# Patient Record
Sex: MALE | Race: WHITE | NOT HISPANIC OR LATINO | Employment: UNEMPLOYED | URBAN - METROPOLITAN AREA
[De-identification: names, ages, dates, MRNs, and addresses within clinical notes are randomized per-mention and may not be internally consistent; named-entity substitution may affect disease eponyms.]

---

## 2020-03-19 ENCOUNTER — HOSPITAL ENCOUNTER (OUTPATIENT)
Facility: HOSPITAL | Age: 60
Discharge: HOME | End: 2020-03-20
Attending: EMERGENCY MEDICINE | Admitting: INTERNAL MEDICINE
Payer: MEDICAID

## 2020-03-19 DIAGNOSIS — R07.9 CHEST PAIN: ICD-10-CM

## 2020-03-19 DIAGNOSIS — R06.02 SOB (SHORTNESS OF BREATH): ICD-10-CM

## 2020-03-19 DIAGNOSIS — R07.9 CHEST PAIN, UNSPECIFIED TYPE: Primary | ICD-10-CM

## 2020-03-19 PROBLEM — I10 HTN (HYPERTENSION): Status: ACTIVE | Noted: 2020-03-19

## 2020-03-19 PROBLEM — I25.10 CAD IN NATIVE ARTERY: Status: ACTIVE | Noted: 2020-03-19

## 2020-03-19 PROBLEM — I49.8 BIGEMINY: Status: ACTIVE | Noted: 2020-03-19

## 2020-03-19 PROBLEM — Z78.9 ALCOHOL USE: Status: ACTIVE | Noted: 2020-03-19

## 2020-03-19 PROBLEM — Z72.0 TOBACCO ABUSE: Status: ACTIVE | Noted: 2020-03-19

## 2020-03-19 PROBLEM — F10.90 ALCOHOL USE: Status: ACTIVE | Noted: 2020-03-19

## 2020-03-19 LAB
ALBUMIN SERPL BCP-MCNC: 4.4 G/DL (ref 3.5–5.2)
ALP SERPL-CCNC: 85 U/L (ref 55–135)
ALT SERPL W/O P-5'-P-CCNC: 25 U/L (ref 10–44)
ANION GAP SERPL CALC-SCNC: 18 MMOL/L (ref 8–16)
AST SERPL-CCNC: 50 U/L (ref 10–40)
BASOPHILS # BLD AUTO: 0.1 K/UL (ref 0–0.2)
BASOPHILS NFR BLD: 1.1 % (ref 0–1.9)
BILIRUB SERPL-MCNC: 1.2 MG/DL (ref 0.1–1)
BNP SERPL-MCNC: 50 PG/ML (ref 0–99)
BUN SERPL-MCNC: 10 MG/DL (ref 6–20)
CALCIUM SERPL-MCNC: 9.2 MG/DL (ref 8.7–10.5)
CHLORIDE SERPL-SCNC: 98 MMOL/L (ref 95–110)
CO2 SERPL-SCNC: 22 MMOL/L (ref 23–29)
CREAT SERPL-MCNC: 0.9 MG/DL (ref 0.5–1.4)
DIFFERENTIAL METHOD: ABNORMAL
EOSINOPHIL # BLD AUTO: 0.1 K/UL (ref 0–0.5)
EOSINOPHIL NFR BLD: 1.3 % (ref 0–8)
ERYTHROCYTE [DISTWIDTH] IN BLOOD BY AUTOMATED COUNT: 12.8 % (ref 11.5–14.5)
EST. GFR  (AFRICAN AMERICAN): >60 ML/MIN/1.73 M^2
EST. GFR  (NON AFRICAN AMERICAN): >60 ML/MIN/1.73 M^2
GLUCOSE SERPL-MCNC: 72 MG/DL (ref 70–110)
HCT VFR BLD AUTO: 43.2 % (ref 40–54)
HGB BLD-MCNC: 14.5 G/DL (ref 14–18)
IMM GRANULOCYTES # BLD AUTO: 0.03 K/UL (ref 0–0.04)
IMM GRANULOCYTES NFR BLD AUTO: 0.3 % (ref 0–0.5)
INR PPP: 1
LYMPHOCYTES # BLD AUTO: 1.6 K/UL (ref 1–4.8)
LYMPHOCYTES NFR BLD: 18.1 % (ref 18–48)
MAGNESIUM SERPL-MCNC: 2 MG/DL (ref 1.6–2.6)
MCH RBC QN AUTO: 31.9 PG (ref 27–31)
MCHC RBC AUTO-ENTMCNC: 33.6 G/DL (ref 32–36)
MCV RBC AUTO: 95 FL (ref 82–98)
MONOCYTES # BLD AUTO: 0.6 K/UL (ref 0.3–1)
MONOCYTES NFR BLD: 6.9 % (ref 4–15)
NEUTROPHILS # BLD AUTO: 6.5 K/UL (ref 1.8–7.7)
NEUTROPHILS NFR BLD: 72.3 % (ref 38–73)
NRBC BLD-RTO: 0 /100 WBC
PLATELET # BLD AUTO: 303 K/UL (ref 150–350)
PMV BLD AUTO: 9.8 FL (ref 9.2–12.9)
POTASSIUM SERPL-SCNC: 4.2 MMOL/L (ref 3.5–5.1)
PROT SERPL-MCNC: 7.5 G/DL (ref 6–8.4)
PROTHROMBIN TIME: 13.1 SEC (ref 10.6–14.8)
RBC # BLD AUTO: 4.55 M/UL (ref 4.6–6.2)
SODIUM SERPL-SCNC: 138 MMOL/L (ref 136–145)
TROPONIN I SERPL DL<=0.01 NG/ML-MCNC: <0.03 NG/ML
WBC # BLD AUTO: 9.04 K/UL (ref 3.9–12.7)

## 2020-03-19 PROCEDURE — 85025 COMPLETE CBC W/AUTO DIFF WBC: CPT

## 2020-03-19 PROCEDURE — G0378 HOSPITAL OBSERVATION PER HR: HCPCS

## 2020-03-19 PROCEDURE — 63600175 PHARM REV CODE 636 W HCPCS: Performed by: EMERGENCY MEDICINE

## 2020-03-19 PROCEDURE — 83735 ASSAY OF MAGNESIUM: CPT

## 2020-03-19 PROCEDURE — 85610 PROTHROMBIN TIME: CPT

## 2020-03-19 PROCEDURE — 96365 THER/PROPH/DIAG IV INF INIT: CPT

## 2020-03-19 PROCEDURE — 93005 ELECTROCARDIOGRAM TRACING: CPT | Mod: 59 | Performed by: INTERNAL MEDICINE

## 2020-03-19 PROCEDURE — 25000003 PHARM REV CODE 250: Performed by: NURSE PRACTITIONER

## 2020-03-19 PROCEDURE — 94761 N-INVAS EAR/PLS OXIMETRY MLT: CPT

## 2020-03-19 PROCEDURE — 99285 EMERGENCY DEPT VISIT HI MDM: CPT | Mod: 25

## 2020-03-19 PROCEDURE — 80053 COMPREHEN METABOLIC PANEL: CPT

## 2020-03-19 PROCEDURE — 96366 THER/PROPH/DIAG IV INF ADDON: CPT

## 2020-03-19 PROCEDURE — 96367 TX/PROPH/DG ADDL SEQ IV INF: CPT

## 2020-03-19 PROCEDURE — 63600175 PHARM REV CODE 636 W HCPCS: Performed by: NURSE PRACTITIONER

## 2020-03-19 PROCEDURE — 84484 ASSAY OF TROPONIN QUANT: CPT

## 2020-03-19 PROCEDURE — 83880 ASSAY OF NATRIURETIC PEPTIDE: CPT

## 2020-03-19 RX ORDER — NAPROXEN SODIUM 220 MG/1
324 TABLET, FILM COATED ORAL ONCE
Status: COMPLETED | OUTPATIENT
Start: 2020-03-19 | End: 2020-03-19

## 2020-03-19 RX ORDER — MAGNESIUM SULFATE HEPTAHYDRATE 40 MG/ML
2 INJECTION, SOLUTION INTRAVENOUS ONCE
Status: COMPLETED | OUTPATIENT
Start: 2020-03-19 | End: 2020-03-19

## 2020-03-19 RX ADMIN — THIAMINE HYDROCHLORIDE 100 MG: 100 INJECTION, SOLUTION INTRAMUSCULAR; INTRAVENOUS at 10:03

## 2020-03-19 RX ADMIN — ASPIRIN 81 MG 324 MG: 81 TABLET ORAL at 01:03

## 2020-03-19 RX ADMIN — MAGNESIUM SULFATE 2 G: 2 INJECTION INTRAVENOUS at 04:03

## 2020-03-19 NOTE — ED PROVIDER NOTES
Encounter Date: 3/19/2020       History     Chief Complaint   Patient presents with    Shortness of Breath    Chest Pain     off and on with some dizziness for 1 week,  denies cough, states has some congestion in am, has cardiac stents, no fever     Presents with complaint of SOB and chest pain  Reports chest pain on and off for 1 yrs  SOB this AM  Denies NVD or fever  Pt reports he is should take both ASA and antihypetensive meds but does not        Review of patient's allergies indicates:  No Known Allergies  Past Medical History:   Diagnosis Date    Coronary artery disease     cardiac stents    Hypertension      History reviewed. No pertinent surgical history.  History reviewed. No pertinent family history.  Social History     Tobacco Use    Smoking status: Current Every Day Smoker     Packs/day: 2.00   Substance Use Topics    Alcohol use: Yes     Comment: occas    Drug use: Not on file     Review of Systems   Constitutional: Negative for fever.   Respiratory: Positive for cough and shortness of breath. Negative for wheezing.    Cardiovascular: Positive for chest pain. Negative for palpitations and leg swelling.   Gastrointestinal: Negative for abdominal pain, diarrhea, nausea and vomiting.   Musculoskeletal: Negative for back pain.   Skin: Negative for rash.   Neurological: Negative for weakness.       Physical Exam     Initial Vitals [03/19/20 1240]   BP Pulse Resp Temp SpO2   (!) 181/91 94 (!) 22 97.9 °F (36.6 °C) 96 %      MAP       --         Physical Exam    Constitutional: He appears well-developed and well-nourished.   HENT:   Head: Normocephalic and atraumatic.   Mouth/Throat: Oropharynx is clear and moist.   Eyes: Conjunctivae are normal.   Neck: Normal range of motion. Neck supple.   Cardiovascular: Normal rate and regular rhythm.   Pulmonary/Chest: Effort normal and breath sounds normal. No accessory muscle usage. No tachypnea. No respiratory distress.   Abdominal: Soft. Bowel sounds are  normal.   Musculoskeletal: Normal range of motion.   Neurological: He is alert and oriented to person, place, and time.   Skin: Skin is warm. Capillary refill takes less than 2 seconds.   Psychiatric: He has a normal mood and affect.         ED Course   Procedures  Labs Reviewed   CBC W/ AUTO DIFFERENTIAL - Abnormal; Notable for the following components:       Result Value    RBC 4.55 (*)     Mean Corpuscular Hemoglobin 31.9 (*)     All other components within normal limits   COMPREHENSIVE METABOLIC PANEL - Abnormal; Notable for the following components:    CO2 22 (*)     Total Bilirubin 1.2 (*)     AST 50 (*)     Anion Gap 18 (*)     All other components within normal limits   B-TYPE NATRIURETIC PEPTIDE   TROPONIN I   PROTIME-INR   MAGNESIUM   MAGNESIUM        ECG Results          EKG 12-lead (In process)  Result time 03/19/20 13:30:06    In process by Interface, Lab In [facility] (03/19/20 13:30:06)                 Narrative:    Test Reason : R07.9,    Vent. Rate : 079 BPM     Atrial Rate : 079 BPM     P-R Int : 148 ms          QRS Dur : 098 ms      QT Int : 394 ms       P-R-T Axes : 083 078 -21 degrees     QTc Int : 451 ms    Sinus rhythm with frequent Premature ventricular complexes  T wave abnormality, consider inferior ischemia  Abnormal ECG  No previous ECGs available    Referred By: [email or web address]   SELF           Confirmed By:                   In process by Interface, Lab In [facility] (03/19/20 13:08:59)                 Narrative:    Test Reason : R07.9,    Vent. Rate : 079 BPM     Atrial Rate : 079 BPM     P-R Int : 148 ms          QRS Dur : 098 ms      QT Int : 394 ms       P-R-T Axes : 083 078 -21 degrees     QTc Int : 451 ms    Sinus rhythm with frequent Premature ventricular complexes  T wave abnormality, consider inferior ischemia  Abnormal ECG  No previous ECGs available    Referred By: [email or web address]   SELF           Confirmed By:                             Imaging Results          X-Ray Chest AP Portable (Final  result)  Result time 03/19/20 13:19:54    Final result by Trino Luu MD (03/19/20 13:19:54)                 Impression:      No acute cardiopulmonary abnormality.      Electronically signed by: Trino Luu MD  Date:    03/19/2020  Time:    13:19             Narrative:    EXAMINATION:  XR CHEST AP PORTABLE    CLINICAL HISTORY:  SOB;    FINDINGS:  Portable chest without comparisons.  Normal cardiomediastinal silhouette.The lungs are clear.  Pulmonary vasculature is normal. No acute osseous abnormality.                                 Medical Decision Making:   Initial Assessment:   Chest pain with SOB  Dr. Lombardo in to examine pt                   ED Course as of Mar 19 1729   Thu Mar 19, 2020   1701 BP: 138/62 [KN]      ED Course User Index  [KN] Nohemi Golden NP                Clinical Impression:       ICD-10-CM ICD-9-CM   1. Chest pain, unspecified type R07.9 786.50   2. Chest pain R07.9 786.50   3. SOB (shortness of breath) R06.02 786.05                                Nohemi Golden NP  03/19/20 1624       Nohemi Golden NP  03/19/20 1732

## 2020-03-19 NOTE — SUBJECTIVE & OBJECTIVE
Past Medical History:   Diagnosis Date    Coronary artery disease     cardiac stents    Hypertension        History reviewed. No pertinent surgical history.    Review of patient's allergies indicates:  No Known Allergies    No current facility-administered medications on file prior to encounter.      No current outpatient medications on file prior to encounter.     Family History     None        Tobacco Use    Smoking status: Current Every Day Smoker     Packs/day: 2.00   Substance and Sexual Activity    Alcohol use: Yes     Comment: occas    Drug use: Not on file    Sexual activity: Not on file     Review of Systems   Constitutional: Negative.    HENT: Negative.    Eyes: Negative.    Respiratory: Positive for chest tightness and shortness of breath.    Cardiovascular: Positive for chest pain.   Gastrointestinal: Negative.    Endocrine: Negative.    Genitourinary: Negative.    Musculoskeletal: Negative.    Skin: Negative.    Allergic/Immunologic: Negative.    Neurological: Negative.    Hematological: Negative.    Psychiatric/Behavioral: Negative.      Objective:     Vital Signs (Most Recent):  Temp: 97.9 °F (36.6 °C) (03/19/20 1240)  Pulse: 86 (03/19/20 1730)  Resp: 18 (03/19/20 1730)  BP: (!) 146/65 (03/19/20 1730)  SpO2: (!) 90 % (03/19/20 1730) Vital Signs (24h Range):  Temp:  [97.9 °F (36.6 °C)] 97.9 °F (36.6 °C)  Pulse:  [69-94] 86  Resp:  [15-25] 18  SpO2:  [90 %-96 %] 90 %  BP: (138-181)/() 146/65     Weight: 56.7 kg (125 lb)  Body mass index is 18.46 kg/m².    Physical Exam   Constitutional: He is oriented to person, place, and time.   Thin male sitting up in bed with no distress at present.   HENT:   Head: Normocephalic.   Eyes: Pupils are equal, round, and reactive to light.   Neck: Normal range of motion. Neck supple. No thyromegaly present.   Cardiovascular: Normal rate, normal heart sounds and intact distal pulses. An irregular rhythm present.   Pulmonary/Chest: Effort normal and breath  sounds normal.   Abdominal: Soft. Bowel sounds are normal.   Musculoskeletal: Normal range of motion.   Neurological: He is alert and oriented to person, place, and time.   Skin: Skin is warm and dry. Capillary refill takes 2 to 3 seconds.   Psychiatric: He has a normal mood and affect.         CRANIAL NERVES     CN III, IV, VI   Pupils are equal, round, and reactive to light.       Significant Labs:   CBC:   Recent Labs   Lab 03/19/20  1300   WBC 9.04   HGB 14.5   HCT 43.2        CMP:   Recent Labs   Lab 03/19/20  1300      K 4.2   CL 98   CO2 22*   GLU 72   BUN 10   CREATININE 0.9   CALCIUM 9.2   PROT 7.5   ALBUMIN 4.4   BILITOT 1.2*   ALKPHOS 85   AST 50*   ALT 25   ANIONGAP 18*   EGFRNONAA >60.0     Cardiac Markers:   Recent Labs   Lab 03/19/20  1300   BNP 50     Magnesium:   Recent Labs   Lab 03/19/20  1300   MG 2.0       Significant Imaging:  Results for orders placed or performed during the hospital encounter of 03/19/20   EKG 12-lead    Collection Time: 03/19/20  3:46 PM    Narrative    Test Reason : R07.9,    Vent. Rate : 068 BPM     Atrial Rate : 068 BPM     P-R Int : 140 ms          QRS Dur : 092 ms      QT Int : 416 ms       P-R-T Axes : 086 081 013 degrees     QTc Int : 442 ms    Sinus rhythm with frequent Premature ventricular complexes  Otherwise normal ECG  When compared with ECG of 19-MAR-2020 15:44,  No significant change was found    Referred By: [email or web address]   SELF           Confirmed By:      Imaging Results          X-Ray Chest AP Portable (Final result)  Result time 03/19/20 13:19:54    Final result by Trino Luu MD (03/19/20 13:19:54)                 Impression:      No acute cardiopulmonary abnormality.      Electronically signed by: Trino Luu MD  Date:    03/19/2020  Time:    13:19             Narrative:    EXAMINATION:  XR CHEST AP PORTABLE    CLINICAL HISTORY:  SOB;    FINDINGS:  Portable chest without comparisons.  Normal cardiomediastinal silhouette.The lungs are  clear.  Pulmonary vasculature is normal. No acute osseous abnormality.

## 2020-03-19 NOTE — HPI
Mr Gonzales is a 58 yo CM who presented to the ED with CP, increasing SOB, HTN and Bigeminy. He states that he has been having CP off and on for the past year but this am he felt extremely SOB on waking this am. CP is atypical and fleeting. It is to the left side below his nipple area and nothing makes it worse and nothing makes it better. Has no radiation, N&V or diaphoresis with it. He does have a history of CAD with coronary intervention about 7 years ago in [place] (thinks it was the LAD). He is not on any medications and has not followed with any physicians since then. He does a lot of walking and this does not make CP worse but he notices increasing SOB with it. He does smoke 1.5-2 PPD cigarettes and admits to drinking 6-12 of beer daily.

## 2020-03-19 NOTE — ASSESSMENT & PLAN NOTE
Admit to observation on telemetry  Trend cardiac enzymes  Monitor for CP  NTG PRN   mg daily  Treadmill nuclear stress test in am.  Defer cardiology consult till am after stress test.

## 2020-03-19 NOTE — H&P
[facility]  History & Physical    Patient Name: Vlad Gonzales  MRN: [id number]  Admission Date: 3/19/2020  Attending Physician: ALEKSANDAR Kowalski  Primary Care Provider: Primary Doctor No         Patient information was obtained from patient and ER records.     Subjective:     Principal Problem:Chest pain    Chief Complaint:   Chief Complaint   Patient presents with    Shortness of Breath    Chest Pain     off and on with some dizziness for 1 week,  denies cough, states has some congestion in am, has cardiac stents, no fever        HPI: Mr Gonzales is a 58 yo CM who presented to the ED with CP, increasing SOB, HTN and Bigeminy. He states that he has been having CP off and on for the past year but this am he felt extremely SOB on waking this am. CP is atypical and fleeting. It is to the left side below his nipple area and nothing makes it worse and nothing makes it better. Has no radiation, N&V or diaphoresis with it. He does have a history of CAD with coronary intervention about 7 years ago in [place] (thinks it was the LAD). He is not on any medications and has not followed with any physicians since then. He does a lot of walking and this does not make CP worse but he notices increasing SOB with it. He does smoke 1.5-2 PPD cigarettes and admits to drinking 6-12 of beer daily.     Past Medical History:   Diagnosis Date    Coronary artery disease     cardiac stents    Hypertension        History reviewed. No pertinent surgical history.    Review of patient's allergies indicates:  No Known Allergies    No current facility-administered medications on file prior to encounter.      No current outpatient medications on file prior to encounter.     Family History     None        Tobacco Use    Smoking status: Current Every Day Smoker     Packs/day: 2.00   Substance and Sexual Activity    Alcohol use: Yes     Comment: occas    Drug use: Not on file    Sexual activity: Not on  file     Review of Systems   Constitutional: Negative.    HENT: Negative.    Eyes: Negative.    Respiratory: Positive for chest tightness and shortness of breath.    Cardiovascular: Positive for chest pain.   Gastrointestinal: Negative.    Endocrine: Negative.    Genitourinary: Negative.    Musculoskeletal: Negative.    Skin: Negative.    Allergic/Immunologic: Negative.    Neurological: Negative.    Hematological: Negative.    Psychiatric/Behavioral: Negative.      Objective:     Vital Signs (Most Recent):  Temp: 97.9 °F (36.6 °C) (03/19/20 1240)  Pulse: 86 (03/19/20 1730)  Resp: 18 (03/19/20 1730)  BP: (!) 146/65 (03/19/20 1730)  SpO2: (!) 90 % (03/19/20 1730) Vital Signs (24h Range):  Temp:  [97.9 °F (36.6 °C)] 97.9 °F (36.6 °C)  Pulse:  [69-94] 86  Resp:  [15-25] 18  SpO2:  [90 %-96 %] 90 %  BP: (138-181)/() 146/65     Weight: 56.7 kg (125 lb)  Body mass index is 18.46 kg/m².    Physical Exam   Constitutional: He is oriented to person, place, and time.   Thin male sitting up in bed with no distress at present.   HENT:   Head: Normocephalic.   Eyes: Pupils are equal, round, and reactive to light.   Neck: Normal range of motion. Neck supple. No thyromegaly present.   Cardiovascular: Normal rate, normal heart sounds and intact distal pulses. An irregular rhythm present.   Pulmonary/Chest: Effort normal and breath sounds normal.   Abdominal: Soft. Bowel sounds are normal.   Musculoskeletal: Normal range of motion.   Neurological: He is alert and oriented to person, place, and time.   Skin: Skin is warm and dry. Capillary refill takes 2 to 3 seconds.   Psychiatric: He has a normal mood and affect.         CRANIAL NERVES     CN III, IV, VI   Pupils are equal, round, and reactive to light.       Significant Labs:   CBC:   Recent Labs   Lab 03/19/20  1300   WBC 9.04   HGB 14.5   HCT 43.2        CMP:   Recent Labs   Lab 03/19/20  1300      K 4.2   CL 98   CO2 22*   GLU 72   BUN 10   CREATININE 0.9    CALCIUM 9.2   PROT 7.5   ALBUMIN 4.4   BILITOT 1.2*   ALKPHOS 85   AST 50*   ALT 25   ANIONGAP 18*   EGFRNONAA >60.0     Cardiac Markers:   Recent Labs   Lab 03/19/20  1300   BNP 50     Magnesium:   Recent Labs   Lab 03/19/20  1300   MG 2.0       Significant Imaging:  Results for orders placed or performed during the hospital encounter of 03/19/20   EKG 12-lead    Collection Time: 03/19/20  3:46 PM    Narrative    Test Reason : R07.9,    Vent. Rate : 068 BPM     Atrial Rate : 068 BPM     P-R Int : 140 ms          QRS Dur : 092 ms      QT Int : 416 ms       P-R-T Axes : 086 081 013 degrees     QTc Int : 442 ms    Sinus rhythm with frequent Premature ventricular complexes  Otherwise normal ECG  When compared with ECG of 19-MAR-2020 15:44,  No significant change was found    Referred By: [email or web address]   SELF           Confirmed By:      Imaging Results          X-Ray Chest AP Portable (Final result)  Result time 03/19/20 13:19:54    Final result by Trino Luu MD (03/19/20 13:19:54)                 Impression:      No acute cardiopulmonary abnormality.      Electronically signed by: Trino Luu MD  Date:    03/19/2020  Time:    13:19             Narrative:    EXAMINATION:  XR CHEST AP PORTABLE    CLINICAL HISTORY:  SOB;    FINDINGS:  Portable chest without comparisons.  Normal cardiomediastinal silhouette.The lungs are clear.  Pulmonary vasculature is normal. No acute osseous abnormality.                              Assessment/Plan:     * Chest pain  Admit to observation on telemetry  Trend cardiac enzymes  Monitor for CP  NTG PRN   mg daily  Treadmill nuclear stress test in am.  Defer cardiology consult till am after stress test.      Bigeminy  Monitor on telemetry  Mag being replaced in ED  Recheck in am  Will check TSH      SOB (shortness of breath)  Monitor oxygen saturation  Oxygen PRN as needed.  Check D Dimer      Alcohol use  Monitor for withdrawal   Thiamine 100 mg today.      Tobacco  abuse  Tobacco cessation counselling  Nicotine Patch daily      CAD in native artery  Chronic  History of stent to possible LAD (according to pt)      HTN (hypertension)  Monitor BP  Start Lisinopril 10 mg daily        VTE Risk Mitigation (From admission, onward)    None             ALEKSANDAR Kowalski  Department of Hospital Medicine   [facility]

## 2020-03-20 ENCOUNTER — HOSPITAL ENCOUNTER (OUTPATIENT)
Dept: RADIOLOGY | Facility: HOSPITAL | Age: 60
Discharge: HOME | End: 2020-03-20
Payer: MEDICAID

## 2020-03-20 ENCOUNTER — CLINICAL SUPPORT (OUTPATIENT)
Dept: CARDIOLOGY | Facility: HOSPITAL | Age: 60
End: 2020-03-20
Payer: MEDICAID

## 2020-03-20 ENCOUNTER — CLINICAL SUPPORT (OUTPATIENT)
Dept: CARDIOLOGY | Facility: HOSPITAL | Age: 60
End: 2020-03-20
Attending: INTERNAL MEDICINE
Payer: MEDICAID

## 2020-03-20 VITALS
OXYGEN SATURATION: 96 % | RESPIRATION RATE: 15 BRPM | HEART RATE: 64 BPM | HEIGHT: 69 IN | TEMPERATURE: 99 F | SYSTOLIC BLOOD PRESSURE: 102 MMHG | WEIGHT: 128.31 LBS | DIASTOLIC BLOOD PRESSURE: 53 MMHG | BODY MASS INDEX: 19 KG/M2

## 2020-03-20 PROBLEM — R07.89 CHEST PAIN, NON-CARDIAC: Status: ACTIVE | Noted: 2020-03-20

## 2020-03-20 LAB
ALBUMIN SERPL BCP-MCNC: 4 G/DL (ref 3.5–5.2)
ALP SERPL-CCNC: 84 U/L (ref 55–135)
ALT SERPL W/O P-5'-P-CCNC: 22 U/L (ref 10–44)
ANION GAP SERPL CALC-SCNC: 14 MMOL/L (ref 8–16)
AST SERPL-CCNC: 41 U/L (ref 10–40)
BASOPHILS # BLD AUTO: 0.08 K/UL (ref 0–0.2)
BASOPHILS NFR BLD: 1.1 % (ref 0–1.9)
BILIRUB SERPL-MCNC: 1.8 MG/DL (ref 0.1–1)
BUN SERPL-MCNC: 14 MG/DL (ref 6–20)
CALCIUM SERPL-MCNC: 9.2 MG/DL (ref 8.7–10.5)
CHLORIDE SERPL-SCNC: 98 MMOL/L (ref 95–110)
CK MB SERPL-MCNC: 2.5 NG/ML (ref 0.1–6.5)
CK MB SERPL-MCNC: 2.5 NG/ML (ref 0.1–6.5)
CO2 SERPL-SCNC: 24 MMOL/L (ref 23–29)
CREAT SERPL-MCNC: 0.9 MG/DL (ref 0.5–1.4)
CV PHARM DOSE: 0.4 MG
CV STRESS BASE HR: 58 BPM
D DIMER PPP IA.FEU-MCNC: 0.36 UG/ML FEU
DIASTOLIC BLOOD PRESSURE: 76 MMHG
DIFFERENTIAL METHOD: ABNORMAL
EOSINOPHIL # BLD AUTO: 0.5 K/UL (ref 0–0.5)
EOSINOPHIL NFR BLD: 7 % (ref 0–8)
ERYTHROCYTE [DISTWIDTH] IN BLOOD BY AUTOMATED COUNT: 12.7 % (ref 11.5–14.5)
EST. GFR  (AFRICAN AMERICAN): >60 ML/MIN/1.73 M^2
EST. GFR  (NON AFRICAN AMERICAN): >60 ML/MIN/1.73 M^2
GLUCOSE SERPL-MCNC: 81 MG/DL (ref 70–110)
HCT VFR BLD AUTO: 45.6 % (ref 40–54)
HGB BLD-MCNC: 15.4 G/DL (ref 14–18)
IMM GRANULOCYTES # BLD AUTO: 0.02 K/UL (ref 0–0.04)
IMM GRANULOCYTES NFR BLD AUTO: 0.3 % (ref 0–0.5)
LYMPHOCYTES # BLD AUTO: 1.6 K/UL (ref 1–4.8)
LYMPHOCYTES NFR BLD: 22.4 % (ref 18–48)
MAGNESIUM SERPL-MCNC: 2.2 MG/DL (ref 1.6–2.6)
MCH RBC QN AUTO: 31.8 PG (ref 27–31)
MCHC RBC AUTO-ENTMCNC: 33.8 G/DL (ref 32–36)
MCV RBC AUTO: 94 FL (ref 82–98)
MONOCYTES # BLD AUTO: 0.9 K/UL (ref 0.3–1)
MONOCYTES NFR BLD: 12.6 % (ref 4–15)
NEUTROPHILS # BLD AUTO: 4.1 K/UL (ref 1.8–7.7)
NEUTROPHILS NFR BLD: 56.6 % (ref 38–73)
NRBC BLD-RTO: 0 /100 WBC
OHS CV CPX 85 PERCENT MAX PREDICTED HEART RATE MALE: 136
OHS CV CPX MAX PREDICTED HEART RATE: 160
OHS CV CPX PATIENT IS FEMALE: 0
OHS CV CPX PATIENT IS MALE: 1
OHS CV CPX PEAK DIASTOLIC BLOOD PRESSURE: 62 MMHG
OHS CV CPX PEAK HEAR RATE: 104 BPM
OHS CV CPX PEAK RATE PRESSURE PRODUCT: NORMAL
OHS CV CPX PEAK SYSTOLIC BLOOD PRESSURE: 149 MMHG
OHS CV CPX PERCENT MAX PREDICTED HEART RATE ACHIEVED: 65
OHS CV CPX RATE PRESSURE PRODUCT PRESENTING: 6670
PLATELET # BLD AUTO: 274 K/UL (ref 150–350)
PMV BLD AUTO: 9.9 FL (ref 9.2–12.9)
POTASSIUM SERPL-SCNC: 3.9 MMOL/L (ref 3.5–5.1)
PROT SERPL-MCNC: 7 G/DL (ref 6–8.4)
RBC # BLD AUTO: 4.85 M/UL (ref 4.6–6.2)
SODIUM SERPL-SCNC: 136 MMOL/L (ref 136–145)
STRESS ECHO TARGET HR: 136 BPM
SYSTOLIC BLOOD PRESSURE: 115 MMHG
TROPONIN I SERPL DL<=0.01 NG/ML-MCNC: <0.03 NG/ML
TROPONIN I SERPL DL<=0.01 NG/ML-MCNC: <0.03 NG/ML
WBC # BLD AUTO: 7.31 K/UL (ref 3.9–12.7)

## 2020-03-20 PROCEDURE — 36415 COLL VENOUS BLD VENIPUNCTURE: CPT

## 2020-03-20 PROCEDURE — 84484 ASSAY OF TROPONIN QUANT: CPT

## 2020-03-20 PROCEDURE — 84484 ASSAY OF TROPONIN QUANT: CPT | Mod: 91

## 2020-03-20 PROCEDURE — A9502 TC99M TETROFOSMIN: HCPCS

## 2020-03-20 PROCEDURE — 25000003 PHARM REV CODE 250: Performed by: NURSE PRACTITIONER

## 2020-03-20 PROCEDURE — 93017 CV STRESS TEST TRACING ONLY: CPT

## 2020-03-20 PROCEDURE — 96366 THER/PROPH/DIAG IV INF ADDON: CPT

## 2020-03-20 PROCEDURE — 83735 ASSAY OF MAGNESIUM: CPT

## 2020-03-20 PROCEDURE — 82553 CREATINE MB FRACTION: CPT | Mod: 91

## 2020-03-20 PROCEDURE — G0378 HOSPITAL OBSERVATION PER HR: HCPCS

## 2020-03-20 PROCEDURE — 63600175 PHARM REV CODE 636 W HCPCS: Performed by: INTERNAL MEDICINE

## 2020-03-20 PROCEDURE — 85379 FIBRIN DEGRADATION QUANT: CPT

## 2020-03-20 PROCEDURE — 93005 ELECTROCARDIOGRAM TRACING: CPT | Performed by: INTERNAL MEDICINE

## 2020-03-20 PROCEDURE — 80053 COMPREHEN METABOLIC PANEL: CPT

## 2020-03-20 PROCEDURE — 85025 COMPLETE CBC W/AUTO DIFF WBC: CPT

## 2020-03-20 RX ORDER — LISINOPRIL 10 MG/1
10 TABLET ORAL NIGHTLY
Qty: 90 TABLET | Refills: 3 | Status: SHIPPED | OUTPATIENT
Start: 2020-03-20 | End: 2021-03-20

## 2020-03-20 RX ORDER — LISINOPRIL 10 MG/1
10 TABLET ORAL NIGHTLY
Status: DISCONTINUED | OUTPATIENT
Start: 2020-03-20 | End: 2020-03-20 | Stop reason: HOSPADM

## 2020-03-20 RX ORDER — REGADENOSON 0.08 MG/ML
0.4 INJECTION, SOLUTION INTRAVENOUS
Status: COMPLETED | OUTPATIENT
Start: 2020-03-20 | End: 2020-03-20

## 2020-03-20 RX ORDER — ASPIRIN 81 MG/1
81 TABLET ORAL DAILY
Refills: 0
Start: 2020-03-20 | End: 2021-03-20

## 2020-03-20 RX ORDER — ASPIRIN 325 MG
325 TABLET ORAL DAILY
Status: DISCONTINUED | OUTPATIENT
Start: 2020-03-20 | End: 2020-03-20 | Stop reason: HOSPADM

## 2020-03-20 RX ORDER — ACETAMINOPHEN 325 MG/1
650 TABLET ORAL EVERY 4 HOURS PRN
Status: DISCONTINUED | OUTPATIENT
Start: 2020-03-20 | End: 2020-03-20 | Stop reason: HOSPADM

## 2020-03-20 RX ORDER — IBUPROFEN 200 MG
1 TABLET ORAL DAILY
Status: DISCONTINUED | OUTPATIENT
Start: 2020-03-20 | End: 2020-03-20 | Stop reason: HOSPADM

## 2020-03-20 RX ORDER — ONDANSETRON 2 MG/ML
4 INJECTION INTRAMUSCULAR; INTRAVENOUS EVERY 6 HOURS PRN
Status: DISCONTINUED | OUTPATIENT
Start: 2020-03-20 | End: 2020-03-20 | Stop reason: HOSPADM

## 2020-03-20 RX ORDER — SODIUM CHLORIDE 0.9 % (FLUSH) 0.9 %
10 SYRINGE (ML) INJECTION
Status: DISCONTINUED | OUTPATIENT
Start: 2020-03-20 | End: 2020-03-20 | Stop reason: HOSPADM

## 2020-03-20 RX ORDER — ATORVASTATIN CALCIUM 40 MG/1
40 TABLET, FILM COATED ORAL DAILY
Status: DISCONTINUED | OUTPATIENT
Start: 2020-03-20 | End: 2020-03-20 | Stop reason: HOSPADM

## 2020-03-20 RX ORDER — LANOLIN ALCOHOL/MO/W.PET/CERES
400 CREAM (GRAM) TOPICAL DAILY
Refills: 0 | COMMUNITY
Start: 2020-03-20

## 2020-03-20 RX ORDER — ONDANSETRON 4 MG/1
8 TABLET, ORALLY DISINTEGRATING ORAL EVERY 6 HOURS PRN
Status: DISCONTINUED | OUTPATIENT
Start: 2020-03-20 | End: 2020-03-20 | Stop reason: HOSPADM

## 2020-03-20 RX ORDER — TALC
8 POWDER (GRAM) TOPICAL NIGHTLY PRN
Status: DISCONTINUED | OUTPATIENT
Start: 2020-03-20 | End: 2020-03-20 | Stop reason: HOSPADM

## 2020-03-20 RX ORDER — NITROGLYCERIN 0.4 MG/1
0.4 TABLET SUBLINGUAL EVERY 5 MIN PRN
Status: DISCONTINUED | OUTPATIENT
Start: 2020-03-20 | End: 2020-03-20 | Stop reason: HOSPADM

## 2020-03-20 RX ORDER — ATORVASTATIN CALCIUM 20 MG/1
20 TABLET, FILM COATED ORAL DAILY
Qty: 90 TABLET | Refills: 3 | Status: SHIPPED | OUTPATIENT
Start: 2020-03-21 | End: 2021-03-21

## 2020-03-20 RX ADMIN — LISINOPRIL 10 MG: 10 TABLET ORAL at 12:03

## 2020-03-20 RX ADMIN — ATORVASTATIN CALCIUM 40 MG: 40 TABLET, FILM COATED ORAL at 10:03

## 2020-03-20 RX ADMIN — ASPIRIN 325 MG ORAL TABLET 325 MG: 325 PILL ORAL at 10:03

## 2020-03-20 RX ADMIN — REGADENOSON 0.4 MG: 0.08 INJECTION, SOLUTION INTRAVENOUS at 08:03

## 2020-03-20 NOTE — PROGRESS NOTES
@  08:36   PATIENT INJECTED WITH        26.8mCi Tc99m MYOVIEW FOR STRESS IMAGES.    LEXISCAN STRESS        RELEASE NPO STATUS FROM NUCLEAR MEDICINE.           S.C., JONATHANMT

## 2020-03-20 NOTE — PLAN OF CARE
03/20/20 0928   Discharge Assessment   Assessment Type Discharge Planning Assessment   Confirmed/corrected address and phone number on facesheet? Yes   Assessment information obtained from? Patient   Expected Length of Stay (days)   (per pt may d.c this afternoon)   Communicated expected length of stay with patient/caregiver yes   Prior to hospitilization cognitive status: Alert/Oriented   Prior to hospitalization functional status: Independent   Current cognitive status: Alert/Oriented   Current Functional Status: Independent   Facility Arrived From: home   Lives With alone  (per pt lives right next to his boss, who is a social support)   Able to Return to Prior Arrangements yes   Is patient able to care for self after discharge? Yes   Who are your caregiver(s) and their phone number(s)? Pt states he does not wish to appoint a d/c planning point of contact besides himself. Pt states he has no POA.   Readmission Within the Last 30 Days no previous admission in last 30 days   Patient currently being followed by outpatient case management? No   Patient currently receives any other outside agency services? No   Equipment Currently Used at Home none   Part D Coverage Pt has [place] [employer] Medicaid   Do you have any problems affording any of your prescribed medications? TBD   Is the patient taking medications as prescribed? no  (Pt states he ran out of meds several years ago and never got refilled)   Does the patient have transportation home? Yes   Transportation Anticipated family or friend will provide  (boss will provide transportation)   Dialysis Name and Scheduled days n/a   Discharge Plan A Home   Discharge Plan B Home   DME Needed Upon Discharge  none   Patient/Family in Agreement with Plan yes     Pt has no CM needs at this time. SW to continue to follow until d/c.

## 2020-03-20 NOTE — NURSING
Discharge instructions reviewed with pt. Questions answered. Copy of discharge instructions given to pt. Discharge home via ambulatory to vehicle.

## 2020-03-20 NOTE — PROGRESS NOTES
@    09:53    OBTAINED STRESS IMAGES.    @ 10:30       NUCLEAR MEDICINE MYOPERFUSION STUDY COMPLETED.    BERNARD ALVAREZ

## 2020-03-21 NOTE — DISCHARGE SUMMARY
[facility]  Discharge Summary      Patient Name: Vlad Gonzales  MRN: [id number]  Admission Date: 3/19/2020  Discharge Date and Time: 3/20/2020  6:07 PM  Discharging Provider: Trino Grace MD  Primary Care Provider: Primary Doctor No    HPI:   Mr Gonzales is a 58 yo CM who presented to the ED with CP, increasing SOB, HTN and Bigeminy. He states that he has been having CP off and on for the past year but this am he felt extremely SOB on waking this am. CP is atypical and fleeting. It is to the left side below his nipple area and nothing makes it worse and nothing makes it better. Has no radiation, N&V or diaphoresis with it. He does have a history of CAD with coronary intervention about 7 years ago in [place] (thinks it was the LAD). He is not on any medications and has not followed with any physicians since then. He does a lot of walking and this does not make CP worse but he notices increasing SOB with it. He does smoke 1.5-2 PPD cigarettes and admits to drinking 6-12 of beer daily.     Hospital Course:   The patient was admitted to the hospital for workup and treatment.  He was started on a medical regimen including statin and aspirin.  ACE-inhibitor was added for blood pressure control.  Patient had some bradycardia and therefore did not receive beta-blocker; additionally he likely has undiagnosed and untreated underlying COPD.  Patient has serial cardiac enzymes, EKG, telemetry monitoring, and nuclear stress testing which were unremarkable.  The patient had no further episodes of chest pain during hospital stay.  He was educated on the importance of smoking reduction and ultimately cessation; he understands he has likely developing lung disease.  Patient has some bigeminy on admission that resolved with magnesium.  The patient will be discharged with oral magnesium supplement.  He was educated on the importance of reduction of alcohol consumption.  At this time the  patient is medically stable for discharge home.  The patient is in understanding and agreement with discharge today.  He will follow up PCP in next month.    Final Active Diagnoses:    Diagnosis Date Noted POA    PRINCIPAL PROBLEM:  Chest pain, non-cardiac [R07.89] 03/20/2020 Yes    Bigeminy [I49.9] 03/19/2020 Yes    HTN (hypertension) [I10] 03/19/2020 Yes    CAD in native artery [I25.10] 03/19/2020 Yes    Tobacco abuse [Z72.0] 03/19/2020 Yes    SOB (shortness of breath) [R06.02] 03/19/2020 Yes    Alcohol use [Z72.89] 03/19/2020 Yes      Additional discharge diagnoses  Sinus bradycardia     Discharge physical exam:  Comfortable appearing, nontoxic, no apparent distress  Commands, fluent speech, follows commands, moist mucous membranes  Comfortable work of breathing, lungs clear bilaterally  2+ radial pulses, regular rate and rhythm    Discharged Condition: good    Disposition: Home or Self Care    Follow Up:  Follow-up Information     PCP of choice In 4 weeks.               Patient Instructions:      Ambulatory referral/consult to Family Practice   Standing Status: Future   Referral Priority: Routine Referral Type: Consultation   Referral Reason: Specialty Services Required   Requested Specialty: Family Medicine   Number of Visits Requested: 1     Diet Cardiac     Notify your health care provider if you experience any of the following:  temperature >100.4     Notify your health care provider if you experience any of the following:  difficulty breathing or increased cough     [88927] IN TOBACCO USE CESSATION INTERMEDIATE 3-10 MIN     Activity as tolerated       Significant Diagnostic Studies:   Nuclear stress test:  1. Scintigraphically negative for ischemia.  2. Global left ventricular hypokinesis.  3. Estimated ejection fraction of 46 %.    Pending Diagnostic Studies:     None         Medications:  Reconciled Home Medications:      Medication List      START taking these medications    aspirin 81 MG EC  tablet  Commonly known as:  ECOTRIN  Take 1 tablet (81 mg total) by mouth once daily.     atorvastatin 20 MG tablet  Commonly known as:  LIPITOR  Take 1 tablet (20 mg total) by mouth once daily.     lisinopriL 10 MG tablet  Take 1 tablet (10 mg total) by mouth every evening.     magnesium oxide 400 mg (241.3 mg magnesium) tablet  Commonly known as:  MAG-OX  Take 1 tablet (400 mg total) by mouth once daily.            Indwelling Lines/Drains at time of discharge:   Lines/Drains/Airways     None                 Time spent on the discharge of patient: 24 minutes  Patient was seen and examined on the date of discharge and determined to be suitable for discharge.       Trino Grace MD  Department of Hospital Medicine  [facility]

## 2021-05-06 ENCOUNTER — HOSPITAL ENCOUNTER (EMERGENCY)
Facility: HOSPITAL | Age: 61
Discharge: HOME | End: 2021-05-06
Attending: EMERGENCY MEDICINE
Payer: MEDICAID

## 2021-05-06 VITALS
BODY MASS INDEX: 20.73 KG/M2 | RESPIRATION RATE: 16 BRPM | HEART RATE: 75 BPM | DIASTOLIC BLOOD PRESSURE: 86 MMHG | HEIGHT: 69 IN | WEIGHT: 140 LBS | TEMPERATURE: 98 F | OXYGEN SATURATION: 96 % | SYSTOLIC BLOOD PRESSURE: 142 MMHG

## 2021-05-06 DIAGNOSIS — R52 PAIN: ICD-10-CM

## 2021-05-06 DIAGNOSIS — M25.512 ACUTE PAIN OF LEFT SHOULDER: Primary | ICD-10-CM

## 2021-05-06 PROCEDURE — 99283 EMERGENCY DEPT VISIT LOW MDM: CPT | Mod: 25

## 2021-05-06 PROCEDURE — 25000003 PHARM REV CODE 250: Performed by: NURSE PRACTITIONER

## 2021-05-06 RX ORDER — NAPROXEN 250 MG/1
500 TABLET ORAL
Status: COMPLETED | OUTPATIENT
Start: 2021-05-06 | End: 2021-05-06

## 2021-05-06 RX ORDER — DICLOFENAC SODIUM 50 MG/1
50 TABLET, DELAYED RELEASE ORAL 3 TIMES DAILY PRN
Qty: 20 TABLET | Refills: 2 | OUTPATIENT
Start: 2021-05-06 | End: 2022-02-23

## 2021-05-06 RX ADMIN — NAPROXEN 500 MG: 250 TABLET ORAL at 05:05

## 2022-02-23 ENCOUNTER — HOSPITAL ENCOUNTER (EMERGENCY)
Facility: HOSPITAL | Age: 62
Discharge: HOME | End: 2022-02-23
Attending: EMERGENCY MEDICINE
Payer: MEDICAID

## 2022-02-23 VITALS
WEIGHT: 140 LBS | SYSTOLIC BLOOD PRESSURE: 142 MMHG | HEART RATE: 86 BPM | BODY MASS INDEX: 20.73 KG/M2 | HEIGHT: 69 IN | OXYGEN SATURATION: 96 % | DIASTOLIC BLOOD PRESSURE: 78 MMHG | RESPIRATION RATE: 16 BRPM | TEMPERATURE: 99 F

## 2022-02-23 DIAGNOSIS — S83.92XA SPRAIN OF LEFT KNEE, UNSPECIFIED LIGAMENT, INITIAL ENCOUNTER: Primary | ICD-10-CM

## 2022-02-23 DIAGNOSIS — R52 PAIN: ICD-10-CM

## 2022-02-23 PROCEDURE — 99283 EMERGENCY DEPT VISIT LOW MDM: CPT

## 2022-02-23 RX ORDER — DICLOFENAC SODIUM 10 MG/G
2 GEL TOPICAL 3 TIMES DAILY
Qty: 100 G | Refills: 0 | Status: SHIPPED | OUTPATIENT
Start: 2022-02-23

## 2022-02-23 NOTE — ED PROVIDER NOTES
Encounter Date: 2/23/2022       History     Chief Complaint   Patient presents with    Knee Pain     Patient reports L knee pain after twisting it last night     Vlad Gonzales is a 61 year old male with pmh CAD, HTN presenting to the ED with c/o left knee pain. He states that he stepped off a ladder yesterday and went to turn but the left knee did not turn when he moved. He reports pain with weight bearing but has not taken any medications for his symptoms. He reports a previous injury to the same knee several years ago but has not seen orthopedics.         Review of patient's allergies indicates:  No Known Allergies  Past Medical History:   Diagnosis Date    Coronary artery disease     cardiac stents    Hypertension      No past surgical history on file.  No family history on file.  Social History     Tobacco Use    Smoking status: Current Every Day Smoker     Packs/day: 2.00   Substance Use Topics    Alcohol use: Yes     Comment: occas     Review of Systems   Constitutional: Negative for fever.   HENT: Negative for sore throat.    Respiratory: Negative for shortness of breath.    Cardiovascular: Negative for chest pain.   Gastrointestinal: Negative for nausea.   Genitourinary: Negative for dysuria.   Musculoskeletal: Positive for arthralgias (left knee). Negative for back pain.   Skin: Negative for rash.   Neurological: Negative for weakness.   Hematological: Does not bruise/bleed easily.       Physical Exam     Initial Vitals [02/23/22 0935]   BP Pulse Resp Temp SpO2   132/82 82 18 98.1 °F (36.7 °C) 98 %      MAP       --         Physical Exam    Nursing note and vitals reviewed.  Constitutional: He appears well-developed and well-nourished. He is not diaphoretic. No distress.   HENT:   Head: Normocephalic and atraumatic.   Mouth/Throat: Oropharynx is clear and moist.   Eyes: Conjunctivae are normal.   Neck: Neck supple.   Cardiovascular: Normal rate, regular rhythm, normal heart sounds and intact distal  pulses. Exam reveals no gallop and no friction rub.    No murmur heard.  Pulmonary/Chest: Breath sounds normal. He has no wheezes. He has no rhonchi. He has no rales.   Musculoskeletal:         General: Normal range of motion.      Cervical back: Neck supple.      Left knee: Swelling (mild) present. No deformity. Normal range of motion. Tenderness present over the medial joint line.      Comments: Pain with palpation superior to the patella   Negative varus/valgus stress  Negative anterior drawer  No erythema or warmth to the knee     Neurological: He is alert and oriented to person, place, and time.   Skin: Skin is warm and dry. No rash noted. No erythema.         ED Course   Procedures  Labs Reviewed - No data to display       Imaging Results          X-Ray Knee Complete 4 or more Views Left (Final result)  Result time 02/23/22 10:11:36   Procedure changed from X-Ray Knee 3 View Left     Final result by Mundo Yu MD (02/23/22 10:11:36)                 Narrative:    XR KNEE 4 OR MORE VIEWS    CLINICAL HISTORY:  61 years Male twisted knee    COMPARISON: None    FINDINGS: No acute fracture or malalignment of the left knee. Mild medial femoral tibial joint space narrowing. Small volume knee joint fluid. Vascular calcifications noted.    IMPRESSION:    No acute osseous abnormality.    Electronically signed by:  Mundo Yu MD  2/23/2022 10:11 AM CST Workstation: [id number]                               Medications - No data to display        APC / Resident Notes:   The patient appears to have a knee sprain.  The patient's xrays show no signs of fracture, dislocation, or subluxation.  The patient could have a ligamentous injury, but the knee doesn't appear to be unstable.  The patient will be discharged home to follow up with their physician or the doctor provided.  They will be treated with supportive care.  Referred to ortho for follow up.                 Clinical Impression:   Final diagnoses:  [R52]  Pain  [S83.92XA] Sprain of left knee, unspecified ligament, initial encounter (Primary)          ED Disposition Condition    Discharge Stable        ED Prescriptions     Medication Sig Dispense Start Date End Date Auth. Provider    diclofenac sodium (VOLTAREN) 1 % Gel Apply 2 g topically 3 (three) times daily. 100 g 2/23/2022  Francia Bhat NP        Follow-up Information     Follow up With Specialties Details Why Contact Info Additional Information    [facility] - Emergency Dept Emergency Medicine  As needed, If symptoms worsen [address] [facility] [address]  [phone] 1st floor    Kim Bowser NP Endocrinology Schedule an appointment as soon as possible for a visit   [address] [place] [id number]  [phone]              Francia Bhat NP  02/23/22 1139

## 2022-06-08 DIAGNOSIS — F17.210 NICOTINE DEPENDENCE, CIGARETTES, UNCOMPLICATED: Primary | ICD-10-CM

## 2022-06-08 DIAGNOSIS — J44.9 COPD (CHRONIC OBSTRUCTIVE PULMONARY DISEASE): Primary | ICD-10-CM

## 2025-01-01 ENCOUNTER — CLINICAL SUPPORT (OUTPATIENT)
Dept: CARDIOLOGY | Facility: HOSPITAL | Age: 65
End: 2025-01-01
Payer: MEDICARE

## 2025-01-01 ENCOUNTER — LAB VISIT (OUTPATIENT)
Dept: LAB | Facility: HOSPITAL | Age: 65
End: 2025-01-01
Attending: INTERNAL MEDICINE
Payer: MEDICARE

## 2025-01-01 ENCOUNTER — TREATMENT (OUTPATIENT)
Dept: RADIATION ONCOLOGY | Facility: CLINIC | Age: 65
End: 2025-01-01
Payer: MEDICARE

## 2025-01-01 ENCOUNTER — HOSPITAL ENCOUNTER (INPATIENT)
Facility: HOSPITAL | Age: 65
LOS: 5 days | DRG: 177 | End: 2025-08-15
Attending: EMERGENCY MEDICINE | Admitting: INTERNAL MEDICINE
Payer: MEDICARE

## 2025-01-01 VITALS
BODY MASS INDEX: 17.13 KG/M2 | SYSTOLIC BLOOD PRESSURE: 94 MMHG | TEMPERATURE: 97 F | DIASTOLIC BLOOD PRESSURE: 52 MMHG | WEIGHT: 115.63 LBS | HEIGHT: 69 IN | HEART RATE: 156 BPM | OXYGEN SATURATION: 69 %

## 2025-01-01 DIAGNOSIS — R07.9 CHEST PAIN: ICD-10-CM

## 2025-01-01 DIAGNOSIS — E22.2 SIADH (SYNDROME OF INAPPROPRIATE ADH PRODUCTION): ICD-10-CM

## 2025-01-01 DIAGNOSIS — C34.31 MALIGNANT NEOPLASM OF LOWER LOBE OF RIGHT LUNG: ICD-10-CM

## 2025-01-01 DIAGNOSIS — J93.0 SPONTANEOUS TENSION PNEUMOTHORAX: ICD-10-CM

## 2025-01-01 DIAGNOSIS — J96.22 ACUTE ON CHRONIC RESPIRATORY FAILURE WITH HYPOXIA AND HYPERCAPNIA: ICD-10-CM

## 2025-01-01 DIAGNOSIS — C34.91 SQUAMOUS CELL CARCINOMA LUNG, RIGHT: ICD-10-CM

## 2025-01-01 DIAGNOSIS — C34.90 MALIGNANT NEOPLASM OF LUNG, UNSPECIFIED LATERALITY, UNSPECIFIED PART OF LUNG: ICD-10-CM

## 2025-01-01 DIAGNOSIS — J96.21 ACUTE ON CHRONIC RESPIRATORY FAILURE WITH HYPOXIA AND HYPERCAPNIA: ICD-10-CM

## 2025-01-01 DIAGNOSIS — J44.1 COPD WITH ACUTE EXACERBATION: Primary | ICD-10-CM

## 2025-01-01 DIAGNOSIS — R09.02 HYPOXIA: ICD-10-CM

## 2025-01-01 DIAGNOSIS — R63.0 ANOREXIA: ICD-10-CM

## 2025-01-01 DIAGNOSIS — Z71.3 NUTRITIONAL COUNSELING: ICD-10-CM

## 2025-01-01 DIAGNOSIS — F43.9 CHEST PAIN DUE TO PSYCHOLOGICAL STRESS: ICD-10-CM

## 2025-01-01 DIAGNOSIS — E43 SEVERE MALNUTRITION: ICD-10-CM

## 2025-01-01 DIAGNOSIS — R06.03 RESPIRATORY DISTRESS: ICD-10-CM

## 2025-01-01 DIAGNOSIS — R64 CACHEXIA: ICD-10-CM

## 2025-01-01 DIAGNOSIS — R05.9 COUGH IN ADULT: ICD-10-CM

## 2025-01-01 DIAGNOSIS — R07.9 CHEST PAIN DUE TO PSYCHOLOGICAL STRESS: ICD-10-CM

## 2025-01-01 DIAGNOSIS — J96.20 ACUTE ON CHRONIC RESPIRATORY FAILURE: ICD-10-CM

## 2025-01-01 DIAGNOSIS — D63.0 ANEMIA IN NEOPLASTIC DISEASE: ICD-10-CM

## 2025-01-01 DIAGNOSIS — C77.1 MALIGNANT NEOPLASM METASTATIC TO INTRATHORACIC LYMPH NODE: ICD-10-CM

## 2025-01-01 DIAGNOSIS — D70.1 CHEMOTHERAPY-INDUCED NEUTROPENIA: ICD-10-CM

## 2025-01-01 DIAGNOSIS — R53.0 NEOPLASTIC MALIGNANT RELATED FATIGUE: ICD-10-CM

## 2025-01-01 DIAGNOSIS — R63.4 LOSS OF WEIGHT: ICD-10-CM

## 2025-01-01 DIAGNOSIS — E43 SEVERE PROTEIN-CALORIE MALNUTRITION: ICD-10-CM

## 2025-01-01 DIAGNOSIS — R10.9 PAIN IN ABDOMEN ON PALPATION: ICD-10-CM

## 2025-01-01 DIAGNOSIS — T45.1X5A CHEMOTHERAPY-INDUCED NEUTROPENIA: ICD-10-CM

## 2025-01-01 LAB
ABO + RH BLD: NORMAL
ABO + RH BLD: NORMAL
ABORH RETYPE: NORMAL
ABSOLUTE EOSINOPHIL (SMH): 0 K/UL
ABSOLUTE MONOCYTE (SMH): 0.06 K/UL (ref 0.3–1)
ABSOLUTE MONOCYTE (SMH): 0.13 K/UL (ref 0.3–1)
ABSOLUTE MONOCYTE (SMH): 0.19 K/UL (ref 0.3–1)
ABSOLUTE NEUTROPHIL COUNT (SMH): 1.8 K/UL (ref 1.8–7.7)
ABSOLUTE NEUTROPHIL COUNT (SMH): 14.8 K/UL (ref 1.8–7.7)
ABSOLUTE NEUTROPHIL COUNT (SMH): 5.1 K/UL (ref 1.8–7.7)
ADENOVIRUS (SMH): NOT DETECTED
ALBUMIN SERPL-MCNC: 2.5 G/DL (ref 3.5–5.2)
ALBUMIN SERPL-MCNC: 2.6 G/DL (ref 3.5–5.2)
ALBUMIN SERPL-MCNC: 2.7 G/DL (ref 3.5–5.2)
ALBUMIN SERPL-MCNC: 2.7 G/DL (ref 3.5–5.2)
ALBUMIN SERPL-MCNC: 2.9 G/DL (ref 3.5–5.2)
ALBUMIN SERPL-MCNC: 3.1 G/DL (ref 3.5–5.2)
ALLENS TEST: ABNORMAL
ALP SERPL-CCNC: 107 UNIT/L (ref 55–135)
ALP SERPL-CCNC: 74 UNIT/L (ref 55–135)
ALP SERPL-CCNC: 75 UNIT/L (ref 55–135)
ALP SERPL-CCNC: 77 UNIT/L (ref 55–135)
ALP SERPL-CCNC: 78 UNIT/L (ref 55–135)
ALP SERPL-CCNC: 79 UNIT/L (ref 55–135)
ALP SERPL-CCNC: 85 UNIT/L (ref 55–135)
ALP SERPL-CCNC: 87 UNIT/L (ref 55–135)
ALT SERPL-CCNC: 15 UNIT/L (ref 10–44)
ALT SERPL-CCNC: 16 UNIT/L (ref 10–44)
ALT SERPL-CCNC: 17 UNIT/L (ref 10–44)
ALT SERPL-CCNC: 19 UNIT/L (ref 10–44)
ALT SERPL-CCNC: 20 UNIT/L (ref 10–44)
ALT SERPL-CCNC: 23 UNIT/L (ref 10–44)
ALT SERPL-CCNC: 28 UNIT/L (ref 10–44)
ALT SERPL-CCNC: 35 UNIT/L (ref 10–44)
ANION GAP (SMH): 10 MMOL/L (ref 8–16)
ANION GAP (SMH): 11 MMOL/L (ref 8–16)
ANION GAP (SMH): 7 MMOL/L (ref 8–16)
ANION GAP (SMH): 8 MMOL/L (ref 8–16)
ANION GAP (SMH): 8 MMOL/L (ref 8–16)
ANION GAP (SMH): 9 MMOL/L (ref 8–16)
ANION GAP (SMH): 9 MMOL/L (ref 8–16)
ANISOCYTOSIS BLD QL SMEAR: SLIGHT
ANISOCYTOSIS BLD QL SMEAR: SLIGHT
AORTIC ROOT ANNULUS: 3 CM
AORTIC VALVE CUSP SEPERATION: 2.3 CM
APICAL FOUR CHAMBER EJECTION FRACTION: 39 %
APICAL TWO CHAMBER EJECTION FRACTION: 37 %
AST SERPL-CCNC: 10 UNIT/L (ref 10–40)
AST SERPL-CCNC: 12 UNIT/L (ref 10–40)
AST SERPL-CCNC: 13 UNIT/L (ref 10–40)
AST SERPL-CCNC: 13 UNIT/L (ref 10–40)
AST SERPL-CCNC: 14 UNIT/L (ref 10–40)
AST SERPL-CCNC: 17 UNIT/L (ref 10–40)
AST SERPL-CCNC: 18 UNIT/L (ref 10–40)
AST SERPL-CCNC: 9 UNIT/L (ref 10–40)
AV INDEX (PROSTH): 1.03
AV MEAN GRADIENT: 2 MMHG
AV PEAK GRADIENT: 4 MMHG
AV VALVE AREA BY VELOCITY RATIO: 3.1 CM²
AV VALVE AREA: 3.2 CM²
AV VELOCITY RATIO: 1
BACTERIA BLD CULT: NORMAL
BACTERIA SPEC CULT: ABNORMAL
BACTERIA UR CULT: NO GROWTH
BASOPHILS # BLD AUTO: 0.01 K/UL
BASOPHILS NFR BLD AUTO: 0.1 %
BASOPHILS NFR BLD AUTO: 0.2 %
BASOPHILS NFR BLD AUTO: 0.5 %
BASOPHILS NFR BLD MANUAL: 1 %
BILIRUB SERPL-MCNC: 0.3 MG/DL (ref 0.1–1)
BILIRUB SERPL-MCNC: 0.3 MG/DL (ref 0.1–1)
BILIRUB SERPL-MCNC: 0.4 MG/DL (ref 0.1–1)
BILIRUB SERPL-MCNC: 0.5 MG/DL (ref 0.1–1)
BILIRUB SERPL-MCNC: 0.5 MG/DL (ref 0.1–1)
BILIRUB SERPL-MCNC: 0.6 MG/DL (ref 0.1–1)
BILIRUB UR QL STRIP.AUTO: NEGATIVE
BLD PROD TYP BPU: NORMAL
BLD PROD TYP BPU: NORMAL
BLOOD UNIT EXPIRATION DATE: NORMAL
BLOOD UNIT EXPIRATION DATE: NORMAL
BLOOD UNIT TYPE CODE: 5100
BLOOD UNIT TYPE CODE: 5100
BNP SERPL-MCNC: 158 PG/ML
BORDETELLA PARAPERTUSSIS (IS1001) (SMH): NOT DETECTED
BORDETELLA PERTUSSIS (PTXP) (SMH): NOT DETECTED
BUN SERPL-MCNC: 14 MG/DL (ref 8–23)
BUN SERPL-MCNC: 15 MG/DL (ref 8–23)
BUN SERPL-MCNC: 16 MG/DL (ref 8–23)
BUN SERPL-MCNC: 16 MG/DL (ref 8–23)
BUN SERPL-MCNC: 17 MG/DL (ref 8–23)
BUN SERPL-MCNC: 18 MG/DL (ref 8–23)
BUN SERPL-MCNC: 19 MG/DL (ref 8–23)
BUN SERPL-MCNC: 20 MG/DL (ref 8–23)
BUN SERPL-MCNC: 38 MG/DL (ref 8–23)
CALCIUM SERPL-MCNC: 8.1 MG/DL (ref 8.7–10.5)
CALCIUM SERPL-MCNC: 8.1 MG/DL (ref 8.7–10.5)
CALCIUM SERPL-MCNC: 8.2 MG/DL (ref 8.7–10.5)
CALCIUM SERPL-MCNC: 8.3 MG/DL (ref 8.7–10.5)
CALCIUM SERPL-MCNC: 8.4 MG/DL (ref 8.7–10.5)
CALCIUM SERPL-MCNC: 8.5 MG/DL (ref 8.7–10.5)
CALCIUM SERPL-MCNC: 8.6 MG/DL (ref 8.7–10.5)
CALCIUM SERPL-MCNC: 8.8 MG/DL (ref 8.7–10.5)
CALCIUM SERPL-MCNC: 9.2 MG/DL (ref 8.7–10.5)
CHLAMYDIA PNEUMONIAE (SMH): NOT DETECTED
CHLORIDE SERPL-SCNC: 100 MMOL/L (ref 95–110)
CHLORIDE SERPL-SCNC: 100 MMOL/L (ref 95–110)
CHLORIDE SERPL-SCNC: 92 MMOL/L (ref 95–110)
CHLORIDE SERPL-SCNC: 94 MMOL/L (ref 95–110)
CHLORIDE SERPL-SCNC: 95 MMOL/L (ref 95–110)
CHLORIDE SERPL-SCNC: 95 MMOL/L (ref 95–110)
CHLORIDE SERPL-SCNC: 96 MMOL/L (ref 95–110)
CHLORIDE SERPL-SCNC: 98 MMOL/L (ref 95–110)
CHLORIDE SERPL-SCNC: 99 MMOL/L (ref 95–110)
CLARITY UR: CLEAR
CO2 SERPL-SCNC: 23 MMOL/L (ref 23–29)
CO2 SERPL-SCNC: 24 MMOL/L (ref 23–29)
CO2 SERPL-SCNC: 25 MMOL/L (ref 23–29)
CO2 SERPL-SCNC: 26 MMOL/L (ref 23–29)
CO2 SERPL-SCNC: 31 MMOL/L (ref 23–29)
CO2 SERPL-SCNC: 33 MMOL/L (ref 23–29)
CO2 SERPL-SCNC: 36 MMOL/L (ref 23–29)
COLOR UR AUTO: YELLOW
CORONAVIRUS 229E, COMMON COLD VIRUS (SMH): NOT DETECTED
CORONAVIRUS HKU1, COMMON COLD VIRUS (SMH): NOT DETECTED
CORONAVIRUS NL63, COMMON COLD VIRUS (SMH): NOT DETECTED
CORONAVIRUS OC43, COMMON COLD VIRUS (SMH): NOT DETECTED
CREAT SERPL-MCNC: 0.4 MG/DL (ref 0.5–1.4)
CREAT SERPL-MCNC: 0.5 MG/DL (ref 0.5–1.4)
CREAT SERPL-MCNC: 0.6 MG/DL (ref 0.5–1.4)
CREAT SERPL-MCNC: 0.7 MG/DL (ref 0.5–1.4)
CROSSMATCH INTERPRETATION: NORMAL
CROSSMATCH INTERPRETATION: NORMAL
CV ECHO LV RWT: 0.27 CM
DELSYS: ABNORMAL
DISPENSE STATUS: NORMAL
DISPENSE STATUS: NORMAL
DOP CALC AO PEAK VEL: 1 M/S
DOP CALC AO VTI: 17.7 CM
DOP CALC LVOT AREA: 3.1 CM2
DOP CALC LVOT DIAMETER: 2 CM
DOP CALC LVOT PEAK VEL: 1 M/S
DOP CALC MV VTI: 15 CM
DOP CALCLVOT PEAK VEL VTI: 18.2 CM
E WAVE DECELERATION TIME: 109 MSEC
E/A RATIO: 0.75
E/E' RATIO: 4 M/S
ECHO LV POSTERIOR WALL: 0.8 CM (ref 0.6–1.1)
EOSINOPHIL NFR BLD MANUAL: 0 % (ref 0–8)
EP: 4
ERYTHROCYTE [DISTWIDTH] IN BLOOD BY AUTOMATED COUNT: 17 % (ref 11.5–14.5)
ERYTHROCYTE [DISTWIDTH] IN BLOOD BY AUTOMATED COUNT: 17.2 % (ref 11.5–14.5)
ERYTHROCYTE [DISTWIDTH] IN BLOOD BY AUTOMATED COUNT: 17.2 % (ref 11.5–14.5)
ERYTHROCYTE [DISTWIDTH] IN BLOOD BY AUTOMATED COUNT: 17.4 % (ref 11.5–14.5)
ERYTHROCYTE [DISTWIDTH] IN BLOOD BY AUTOMATED COUNT: 17.5 % (ref 11.5–14.5)
ERYTHROCYTE [DISTWIDTH] IN BLOOD BY AUTOMATED COUNT: 17.5 % (ref 11.5–14.5)
ERYTHROCYTE [DISTWIDTH] IN BLOOD BY AUTOMATED COUNT: 18.1 % (ref 11.5–14.5)
ERYTHROCYTE [DISTWIDTH] IN BLOOD BY AUTOMATED COUNT: 18.3 % (ref 11.5–14.5)
ERYTHROCYTE [SEDIMENTATION RATE] IN BLOOD BY WESTERGREN METHOD: 20 MM/H
FIO2: 100
FIO2: 100
FIO2: 85
FLOW: 35
FLOW: 40
FLUBV RNA NPH QL NAA+NON-PROBE: NOT DETECTED
FRACTIONAL SHORTENING: 16.7 % (ref 28–44)
GFR SERPLBLD CREATININE-BSD FMLA CKD-EPI: >60 ML/MIN/1.73/M2
GLOBAL LONGITUIDAL STRAIN: -12 %
GLUCOSE SERPL-MCNC: 120 MG/DL (ref 70–110)
GLUCOSE SERPL-MCNC: 157 MG/DL (ref 70–110)
GLUCOSE SERPL-MCNC: 159 MG/DL (ref 70–110)
GLUCOSE SERPL-MCNC: 162 MG/DL (ref 70–110)
GLUCOSE SERPL-MCNC: 165 MG/DL (ref 70–110)
GLUCOSE SERPL-MCNC: 169 MG/DL (ref 70–110)
GLUCOSE SERPL-MCNC: 202 MG/DL (ref 70–110)
GLUCOSE SERPL-MCNC: 205 MG/DL (ref 70–110)
GLUCOSE SERPL-MCNC: 206 MG/DL (ref 70–110)
GLUCOSE UR QL STRIP: NEGATIVE
GRAM STAIN (RESPIRATORY) (SMH): ABNORMAL
HCO3 UR-SCNC: 28.1 MMOL/L (ref 24–28)
HCO3 UR-SCNC: 28.3 MMOL/L (ref 24–28)
HCO3 UR-SCNC: 32.9 MMOL/L (ref 24–28)
HCO3 UR-SCNC: 39.4 MMOL/L (ref 24–28)
HCT VFR BLD AUTO: 22.6 % (ref 40–54)
HCT VFR BLD AUTO: 23.1 % (ref 40–54)
HCT VFR BLD AUTO: 24.3 % (ref 40–54)
HCT VFR BLD AUTO: 24.6 % (ref 40–54)
HCT VFR BLD AUTO: 25.1 % (ref 40–54)
HCT VFR BLD AUTO: 28.5 % (ref 40–54)
HCT VFR BLD AUTO: 29.4 % (ref 40–54)
HCT VFR BLD AUTO: 30.3 % (ref 40–54)
HCT VFR BLD AUTO: 33 % (ref 40–54)
HCT VFR BLD AUTO: 35.9 % (ref 40–54)
HGB BLD-MCNC: 10.1 GM/DL (ref 14–18)
HGB BLD-MCNC: 11.4 GM/DL (ref 14–18)
HGB BLD-MCNC: 7.3 GM/DL (ref 14–18)
HGB BLD-MCNC: 7.4 GM/DL (ref 14–18)
HGB BLD-MCNC: 7.7 GM/DL (ref 14–18)
HGB BLD-MCNC: 8 GM/DL (ref 14–18)
HGB BLD-MCNC: 8.2 GM/DL (ref 14–18)
HGB BLD-MCNC: 9.1 GM/DL (ref 14–18)
HGB BLD-MCNC: 9.2 GM/DL (ref 14–18)
HGB BLD-MCNC: 9.7 GM/DL (ref 14–18)
HGB UR QL STRIP: NEGATIVE
HPIV1 RNA NPH QL NAA+NON-PROBE: NOT DETECTED
HPIV2 RNA NPH QL NAA+NON-PROBE: NOT DETECTED
HPIV3 RNA NPH QL NAA+NON-PROBE: NOT DETECTED
HPIV4 RNA NPH QL NAA+NON-PROBE: NOT DETECTED
HUMAN METAPNEUMOVIRUS (SMH): NOT DETECTED
HYPOCHROMIA BLD QL SMEAR: ABNORMAL
IMM GRANULOCYTES # BLD AUTO: 0.05 K/UL (ref 0–0.04)
IMM GRANULOCYTES # BLD AUTO: 0.08 K/UL (ref 0–0.04)
IMM GRANULOCYTES # BLD AUTO: 0.34 K/UL (ref 0–0.04)
IMM GRANULOCYTES NFR BLD AUTO: 1.5 % (ref 0–0.5)
IMM GRANULOCYTES NFR BLD AUTO: 2.2 % (ref 0–0.5)
IMM GRANULOCYTES NFR BLD AUTO: 2.4 % (ref 0–0.5)
INDIRECT COOMBS: NORMAL
INFLUENZA A (SUBTYPES H1,H1-2009,H3) (SMH): NOT DETECTED
INTERVENTRICULAR SEPTUM: 1 CM (ref 0.6–1.1)
IP: 16
IVC DIAMETER: 1.79 CM
KETONES UR QL STRIP: NEGATIVE
LACTATE SERPL-SCNC: 1 MMOL/L (ref 0.5–1.9)
LACTATE SERPL-SCNC: 1.1 MMOL/L (ref 0.5–1.9)
LACTATE SERPL-SCNC: 2.4 MMOL/L (ref 0.5–1.9)
LACTATE SERPL-SCNC: 2.6 MMOL/L (ref 0.5–1.9)
LDH SERPL L TO P-CCNC: 1.55 MMOL/L (ref 0.5–2.2)
LEFT ATRIUM AREA SYSTOLIC (APICAL 4 CHAMBER): 13 CM2
LEFT ATRIUM SIZE: 2.5 CM
LEFT INTERNAL DIMENSION IN SYSTOLE: 5 CM (ref 2.1–4)
LEFT VENTRICLE DIASTOLIC VOLUME INDEX: 107.93 ML/M2
LEFT VENTRICLE DIASTOLIC VOLUME: 177 ML
LEFT VENTRICLE END DIASTOLIC VOLUME APICAL 2 CHAMBER: 157 ML
LEFT VENTRICLE END DIASTOLIC VOLUME APICAL 4 CHAMBER INDEX BSA: 98.78 ML/M2
LEFT VENTRICLE END DIASTOLIC VOLUME APICAL 4 CHAMBER: 162 ML
LEFT VENTRICLE END SYSTOLIC VOLUME APICAL 4 CHAMBER: 26.6 ML
LEFT VENTRICLE MASS INDEX: 131.5 G/M2
LEFT VENTRICLE SYSTOLIC VOLUME INDEX: 72.6 ML/M2
LEFT VENTRICLE SYSTOLIC VOLUME: 119 ML
LEFT VENTRICULAR INTERNAL DIMENSION IN DIASTOLE: 6 CM (ref 3.5–6)
LEFT VENTRICULAR MASS: 215.7 G
LEUKOCYTE ESTERASE UR QL STRIP: NEGATIVE
LV LATERAL E/E' RATIO: 3.1 M/S
LV SEPTAL E/E' RATIO: 5.7 M/S
LVED V (TEICH): 177 ML
LVES V (TEICH): 119 ML
LVOT MG: 2 MMHG
LVOT MV: 0.67 CM/S
LYMPHOCYTES # BLD AUTO: 0.07 K/UL (ref 1–4.8)
LYMPHOCYTES # BLD AUTO: 0.1 K/UL (ref 1–4.8)
LYMPHOCYTES # BLD AUTO: 0.14 K/UL (ref 1–4.8)
LYMPHOCYTES NFR BLD MANUAL: 10 % (ref 18–48)
LYMPHOCYTES NFR BLD MANUAL: 10 % (ref 18–48)
LYMPHOCYTES NFR BLD MANUAL: 14 % (ref 18–48)
LYMPHOCYTES NFR BLD MANUAL: 20 % (ref 18–48)
LYMPHOCYTES NFR BLD MANUAL: 6 % (ref 18–48)
MAGNESIUM SERPL-MCNC: 1.6 MG/DL (ref 1.6–2.6)
MAGNESIUM SERPL-MCNC: 1.7 MG/DL (ref 1.6–2.6)
MAGNESIUM SERPL-MCNC: 1.9 MG/DL (ref 1.6–2.6)
MAGNESIUM SERPL-MCNC: 2.1 MG/DL (ref 1.6–2.6)
MCH RBC QN AUTO: 25.7 PG (ref 27–31)
MCH RBC QN AUTO: 26 PG (ref 27–31)
MCH RBC QN AUTO: 26.3 PG (ref 27–31)
MCH RBC QN AUTO: 26.5 PG (ref 27–31)
MCH RBC QN AUTO: 26.6 PG (ref 27–31)
MCH RBC QN AUTO: 26.7 PG (ref 27–31)
MCH RBC QN AUTO: 26.7 PG (ref 27–31)
MCH RBC QN AUTO: 26.9 PG (ref 27–31)
MCHC RBC AUTO-ENTMCNC: 30.6 G/DL (ref 32–36)
MCHC RBC AUTO-ENTMCNC: 31 G/DL (ref 32–36)
MCHC RBC AUTO-ENTMCNC: 31.6 G/DL (ref 32–36)
MCHC RBC AUTO-ENTMCNC: 31.7 G/DL (ref 32–36)
MCHC RBC AUTO-ENTMCNC: 31.8 G/DL (ref 32–36)
MCHC RBC AUTO-ENTMCNC: 31.9 G/DL (ref 32–36)
MCHC RBC AUTO-ENTMCNC: 32 G/DL (ref 32–36)
MCHC RBC AUTO-ENTMCNC: 32.3 G/DL (ref 32–36)
MCHC RBC AUTO-ENTMCNC: 32.7 G/DL (ref 32–36)
MCHC RBC AUTO-ENTMCNC: 33.3 G/DL (ref 32–36)
MCV RBC AUTO: 79 FL (ref 82–98)
MCV RBC AUTO: 81 FL (ref 82–98)
MCV RBC AUTO: 82 FL (ref 82–98)
MCV RBC AUTO: 83 FL (ref 82–98)
MCV RBC AUTO: 83 FL (ref 82–98)
MCV RBC AUTO: 84 FL (ref 82–98)
MCV RBC AUTO: 86 FL (ref 82–98)
MCV RBC AUTO: 88 FL (ref 82–98)
METAMYELOCYTES NFR BLD MANUAL: 1 %
MODE: ABNORMAL
MONOCYTES NFR BLD MANUAL: 13 % (ref 4–15)
MONOCYTES NFR BLD MANUAL: 4 % (ref 4–15)
MONOCYTES NFR BLD MANUAL: 6 % (ref 4–15)
MONOCYTES NFR BLD MANUAL: 8 % (ref 4–15)
MONOCYTES NFR BLD MANUAL: 9 % (ref 4–15)
MR PISA EROA: 0.06 CM2
MRSA PCR SCRN (SMH): NOT DETECTED
MV MEAN GRADIENT: 2 MMHG
MV PEAK A VEL: 0.91 M/S
MV PEAK E VEL: 0.68 M/S
MV PEAK GRADIENT: 4 MMHG
MV STENOSIS PRESSURE HALF TIME: 77 MS
MV VALVE AREA BY CONTINUITY EQUATION: 3.81 CM2
MV VALVE AREA P 1/2 METHOD: 2.86 CM2
MYCOPLASMA PNEUMONIAE (SMH): NOT DETECTED
NEUTROPHILS NFR BLD MANUAL: 70 % (ref 38–73)
NEUTROPHILS NFR BLD MANUAL: 75 % (ref 38–73)
NEUTROPHILS NFR BLD MANUAL: 75 % (ref 38–73)
NEUTROPHILS NFR BLD MANUAL: 76 % (ref 38–73)
NEUTROPHILS NFR BLD MANUAL: 80 % (ref 38–73)
NEUTS BAND NFR BLD MANUAL: 2 %
NEUTS BAND NFR BLD MANUAL: 2 %
NEUTS BAND NFR BLD MANUAL: 3 %
NEUTS BAND NFR BLD MANUAL: 6 %
NEUTS BAND NFR BLD MANUAL: 9 %
NITRITE UR QL STRIP: NEGATIVE
NUCLEATED RBC (/100WBC) (SMH): 0 /100 WBC
NUCLEATED RBC (/100WBC) (SMH): 1 /100 WBC
OHS CV CPX PATIENT HEIGHT IN: 69
OHS CV RV/LV RATIO: 0.32 CM
OHS LV EJECTION FRACTION SIMPSONS BIPLANE MOD: 38 %
OHS QRS DURATION: 90 MS
OHS QRS DURATION: 92 MS
OHS QTC CALCULATION: 434 MS
OHS QTC CALCULATION: 456 MS
PCO2 BLDA: 40.1 MMHG (ref 35–45)
PCO2 BLDA: 44.4 MMHG (ref 35–45)
PCO2 BLDA: 51.9 MMHG (ref 35–45)
PCO2 BLDA: 85.6 MMHG (ref 35–45)
PH SMN: 7.27 [PH] (ref 7.35–7.45)
PH SMN: 7.41 [PH] (ref 7.35–7.45)
PH SMN: 7.41 [PH] (ref 7.35–7.45)
PH SMN: 7.46 [PH] (ref 7.35–7.45)
PH UR STRIP: 6 [PH]
PISA MRMAX VEL: 5 M/S
PISA RADIUS: 0.4 CM
PISA VN NYQUIST MS: 0.3 M/S
PISA VN NYQUIST: 0.3 M/S
PLATELET # BLD AUTO: 176 K/UL (ref 150–450)
PLATELET # BLD AUTO: 204 K/UL (ref 150–450)
PLATELET # BLD AUTO: 233 K/UL (ref 150–450)
PLATELET # BLD AUTO: 236 K/UL (ref 150–450)
PLATELET # BLD AUTO: 239 K/UL (ref 150–450)
PLATELET # BLD AUTO: 244 K/UL (ref 150–450)
PLATELET # BLD AUTO: 249 K/UL (ref 150–450)
PLATELET # BLD AUTO: 255 K/UL (ref 150–450)
PLATELET # BLD AUTO: 274 K/UL (ref 150–450)
PLATELET # BLD AUTO: 391 K/UL (ref 150–450)
PLATELET BLD QL SMEAR: ABNORMAL
PLATELET BLD QL SMEAR: NORMAL
PLATELET BLD QL SMEAR: NORMAL
PMV BLD AUTO: 10 FL (ref 9.2–12.9)
PMV BLD AUTO: 10.2 FL (ref 9.2–12.9)
PMV BLD AUTO: 10.6 FL (ref 9.2–12.9)
PMV BLD AUTO: 9.1 FL (ref 9.2–12.9)
PMV BLD AUTO: 9.5 FL (ref 9.2–12.9)
PMV BLD AUTO: 9.6 FL (ref 9.2–12.9)
PMV BLD AUTO: 9.7 FL (ref 9.2–12.9)
PMV BLD AUTO: 9.8 FL (ref 9.2–12.9)
PO2 BLDA: 48 MMHG (ref 80–100)
PO2 BLDA: 49 MMHG (ref 80–100)
PO2 BLDA: 53 MMHG (ref 80–100)
PO2 BLDA: 74 MMHG (ref 80–100)
POC BE: 13 MMOL/L (ref -2–2)
POC BE: 3 MMOL/L (ref -2–2)
POC BE: 4 MMOL/L (ref -2–2)
POC BE: 8 MMOL/L (ref -2–2)
POC SATURATED O2: 74 % (ref 95–100)
POC SATURATED O2: 86 % (ref 95–100)
POC SATURATED O2: 87 % (ref 95–100)
POC SATURATED O2: 95 % (ref 95–100)
POC TCO2: 29 MMOL/L (ref 23–27)
POC TCO2: 29 MMOL/L (ref 23–27)
POC TCO2: 35 MMOL/L (ref 23–27)
POC TCO2: 42 MMOL/L (ref 23–27)
POTASSIUM SERPL-SCNC: 3.4 MMOL/L (ref 3.5–5.1)
POTASSIUM SERPL-SCNC: 3.5 MMOL/L (ref 3.5–5.1)
POTASSIUM SERPL-SCNC: 3.5 MMOL/L (ref 3.5–5.1)
POTASSIUM SERPL-SCNC: 3.7 MMOL/L (ref 3.5–5.1)
POTASSIUM SERPL-SCNC: 3.7 MMOL/L (ref 3.5–5.1)
POTASSIUM SERPL-SCNC: 3.9 MMOL/L (ref 3.5–5.1)
POTASSIUM SERPL-SCNC: 3.9 MMOL/L (ref 3.5–5.1)
POTASSIUM SERPL-SCNC: 4 MMOL/L (ref 3.5–5.1)
POTASSIUM SERPL-SCNC: 4.2 MMOL/L (ref 3.5–5.1)
POTASSIUM SERPL-SCNC: 4.3 MMOL/L (ref 3.5–5.1)
PROCALCITONIN SERPL-MCNC: 4.07 NG/ML
PROT SERPL-MCNC: 5.8 GM/DL (ref 6–8.4)
PROT SERPL-MCNC: 6 GM/DL (ref 6–8.4)
PROT SERPL-MCNC: 6.1 GM/DL (ref 6–8.4)
PROT SERPL-MCNC: 6.1 GM/DL (ref 6–8.4)
PROT SERPL-MCNC: 6.2 GM/DL (ref 6–8.4)
PROT SERPL-MCNC: 6.3 GM/DL (ref 6–8.4)
PROT SERPL-MCNC: 6.4 GM/DL (ref 6–8.4)
PROT SERPL-MCNC: 7.3 GM/DL (ref 6–8.4)
PROT UR QL STRIP: NEGATIVE
RA PRESSURE ESTIMATED: 3 MMHG
RBC # BLD AUTO: 2.77 M/UL (ref 4.6–6.2)
RBC # BLD AUTO: 2.78 M/UL (ref 4.6–6.2)
RBC # BLD AUTO: 2.96 M/UL (ref 4.6–6.2)
RBC # BLD AUTO: 3.1 M/UL (ref 4.6–6.2)
RBC # BLD AUTO: 3.11 M/UL (ref 4.6–6.2)
RBC # BLD AUTO: 3.44 M/UL (ref 4.6–6.2)
RBC # BLD AUTO: 3.47 M/UL (ref 4.6–6.2)
RBC # BLD AUTO: 3.64 M/UL (ref 4.6–6.2)
RBC # BLD AUTO: 3.76 M/UL (ref 4.6–6.2)
RBC # BLD AUTO: 4.27 M/UL (ref 4.6–6.2)
RELATIVE EOSINOPHIL (SMH): 0 % (ref 0–8)
RELATIVE LYMPHOCYTE (SMH): 0.5 % (ref 18–48)
RELATIVE LYMPHOCYTE (SMH): 1.9 % (ref 18–48)
RELATIVE LYMPHOCYTE (SMH): 6.7 % (ref 18–48)
RELATIVE MONOCYTE (SMH): 1.2 % (ref 4–15)
RELATIVE MONOCYTE (SMH): 2.4 % (ref 4–15)
RELATIVE MONOCYTE (SMH): 2.9 % (ref 4–15)
RELATIVE NEUTROPHIL (SMH): 87.5 % (ref 38–73)
RELATIVE NEUTROPHIL (SMH): 94 % (ref 38–73)
RELATIVE NEUTROPHIL (SMH): 96 % (ref 38–73)
RESPIRATORY INFECTION PANEL SOURCE (SMH): NORMAL
RH BLD: NORMAL
RIGHT ATRIUM END SYSTOLIC VOLUME APICAL 4 CHAMBER INDEX BSA: 16.04 ML/M2
RIGHT ATRIUM VOLUME AREA LENGTH APICAL 4 CHAMBER: 26.3 ML
RIGHT VENTRICLE DIASTOLIC BASEL DIMENSION: 1.9 CM
RIGHT VENTRICULAR END-DIASTOLIC DIMENSION: 1.87 CM
RSV RNA NPH QL NAA+NON-PROBE: NOT DETECTED
RV TISSUE DOPPLER FREE WALL SYSTOLIC VELOCITY 1 (APICAL 4 CHAMBER VIEW): 12.2 CM/S
RV+EV RNA NPH QL NAA+NON-PROBE: NOT DETECTED
SAMPLE: ABNORMAL
SAMPLE: NORMAL
SARS-COV-2 RNA RESP QL NAA+PROBE: NOT DETECTED
SITE: ABNORMAL
SODIUM SERPL-SCNC: 129 MMOL/L (ref 136–145)
SODIUM SERPL-SCNC: 129 MMOL/L (ref 136–145)
SODIUM SERPL-SCNC: 130 MMOL/L (ref 136–145)
SODIUM SERPL-SCNC: 131 MMOL/L (ref 136–145)
SODIUM SERPL-SCNC: 132 MMOL/L (ref 136–145)
SODIUM SERPL-SCNC: 134 MMOL/L (ref 136–145)
SODIUM SERPL-SCNC: 142 MMOL/L (ref 136–145)
SP GR UR STRIP: 1.01
SPECIMEN OUTDATE: NORMAL
SPONT RATE: 34
TDI LATERAL: 0.22 M/S
TDI SEPTAL: 0.12 M/S
TDI: 0.17 M/S
TRICUSPID ANNULAR PLANE SYSTOLIC EXCURSION: 2.3 CM
TROPONIN HIGH SENSITIVE (SMH): 5.2 PG/ML
TROPONIN HIGH SENSITIVE (SMH): 5.8 PG/ML
TROPONIN HIGH SENSITIVE (SMH): 8.3 PG/ML
TSH SERPL-ACNC: 2.71 UIU/ML (ref 0.34–5.6)
UNIT NUMBER: NORMAL
UNIT NUMBER: NORMAL
UROBILINOGEN UR STRIP-ACNC: ABNORMAL EU/DL
VANCOMYCIN TROUGH SERPL-MCNC: 6.4 UG/ML (ref ?–20)
WBC # BLD AUTO: 1.45 K/UL (ref 3.9–12.7)
WBC # BLD AUTO: 1.67 K/UL (ref 3.9–12.7)
WBC # BLD AUTO: 1.76 K/UL (ref 3.9–12.7)
WBC # BLD AUTO: 15.45 K/UL (ref 3.9–12.7)
WBC # BLD AUTO: 2.09 K/UL (ref 3.9–12.7)
WBC # BLD AUTO: 2.12 K/UL (ref 3.9–12.7)
WBC # BLD AUTO: 2.22 K/UL (ref 3.9–12.7)
WBC # BLD AUTO: 2.22 K/UL (ref 3.9–12.7)
WBC # BLD AUTO: 2.39 K/UL (ref 3.9–12.7)
WBC # BLD AUTO: 5.39 K/UL (ref 3.9–12.7)
Z-SCORE OF LEFT VENTRICULAR DIMENSION IN END DIASTOLE: 2.62
Z-SCORE OF LEFT VENTRICULAR DIMENSION IN END SYSTOLE: 4.41

## 2025-01-01 PROCEDURE — 63600175 PHARM REV CODE 636 W HCPCS: Performed by: STUDENT IN AN ORGANIZED HEALTH CARE EDUCATION/TRAINING PROGRAM

## 2025-01-01 PROCEDURE — 84484 ASSAY OF TROPONIN QUANT: CPT

## 2025-01-01 PROCEDURE — 94640 AIRWAY INHALATION TREATMENT: CPT

## 2025-01-01 PROCEDURE — 99900035 HC TECH TIME PER 15 MIN (STAT)

## 2025-01-01 PROCEDURE — 63600175 PHARM REV CODE 636 W HCPCS

## 2025-01-01 PROCEDURE — 20000000 HC ICU ROOM

## 2025-01-01 PROCEDURE — 94799 UNLISTED PULMONARY SVC/PX: CPT

## 2025-01-01 PROCEDURE — 96361 HYDRATE IV INFUSION ADD-ON: CPT

## 2025-01-01 PROCEDURE — 32551 INSERTION OF CHEST TUBE: CPT

## 2025-01-01 PROCEDURE — 25000242 PHARM REV CODE 250 ALT 637 W/ HCPCS: Performed by: INTERNAL MEDICINE

## 2025-01-01 PROCEDURE — 0W9900Z DRAINAGE OF RIGHT PLEURAL CAVITY WITH DRAINAGE DEVICE, OPEN APPROACH: ICD-10-PCS | Performed by: INTERNAL MEDICINE

## 2025-01-01 PROCEDURE — 83735 ASSAY OF MAGNESIUM: CPT

## 2025-01-01 PROCEDURE — 27000221 HC OXYGEN, UP TO 24 HOURS

## 2025-01-01 PROCEDURE — 99291 CRITICAL CARE FIRST HOUR: CPT | Mod: 25,,, | Performed by: INTERNAL MEDICINE

## 2025-01-01 PROCEDURE — 94761 N-INVAS EAR/PLS OXIMETRY MLT: CPT

## 2025-01-01 PROCEDURE — 80053 COMPREHEN METABOLIC PANEL: CPT | Performed by: INTERNAL MEDICINE

## 2025-01-01 PROCEDURE — 83735 ASSAY OF MAGNESIUM: CPT | Performed by: INTERNAL MEDICINE

## 2025-01-01 PROCEDURE — 80053 COMPREHEN METABOLIC PANEL: CPT | Performed by: EMERGENCY MEDICINE

## 2025-01-01 PROCEDURE — 82040 ASSAY OF SERUM ALBUMIN: CPT

## 2025-01-01 PROCEDURE — 93306 TTE W/DOPPLER COMPLETE: CPT

## 2025-01-01 PROCEDURE — 77336 RADIATION PHYSICS CONSULT: CPT | Mod: S$GLB,,, | Performed by: RADIOLOGY

## 2025-01-01 PROCEDURE — 86901 BLOOD TYPING SEROLOGIC RH(D): CPT

## 2025-01-01 PROCEDURE — 11000001 HC ACUTE MED/SURG PRIVATE ROOM

## 2025-01-01 PROCEDURE — 83880 ASSAY OF NATRIURETIC PEPTIDE: CPT | Performed by: EMERGENCY MEDICINE

## 2025-01-01 PROCEDURE — 85025 COMPLETE CBC W/AUTO DIFF WBC: CPT

## 2025-01-01 PROCEDURE — 93005 ELECTROCARDIOGRAM TRACING: CPT | Performed by: INTERNAL MEDICINE

## 2025-01-01 PROCEDURE — 36430 TRANSFUSION BLD/BLD COMPNT: CPT

## 2025-01-01 PROCEDURE — P9016 RBC LEUKOCYTES REDUCED: HCPCS

## 2025-01-01 PROCEDURE — 63600175 PHARM REV CODE 636 W HCPCS: Mod: JZ,TB | Performed by: STUDENT IN AN ORGANIZED HEALTH CARE EDUCATION/TRAINING PROGRAM

## 2025-01-01 PROCEDURE — 25000003 PHARM REV CODE 250: Performed by: INTERNAL MEDICINE

## 2025-01-01 PROCEDURE — 80202 ASSAY OF VANCOMYCIN: CPT

## 2025-01-01 PROCEDURE — 63700000 PHARM REV CODE 250 ALT 637 W/O HCPCS: Performed by: INTERNAL MEDICINE

## 2025-01-01 PROCEDURE — 82040 ASSAY OF SERUM ALBUMIN: CPT | Performed by: INTERNAL MEDICINE

## 2025-01-01 PROCEDURE — 85025 COMPLETE CBC W/AUTO DIFF WBC: CPT | Performed by: INTERNAL MEDICINE

## 2025-01-01 PROCEDURE — 32551 INSERTION OF CHEST TUBE: CPT | Mod: RT,,, | Performed by: INTERNAL MEDICINE

## 2025-01-01 PROCEDURE — 25000003 PHARM REV CODE 250

## 2025-01-01 PROCEDURE — 25000242 PHARM REV CODE 250 ALT 637 W/ HCPCS

## 2025-01-01 PROCEDURE — 99900031 HC PATIENT EDUCATION (STAT)

## 2025-01-01 PROCEDURE — 36415 COLL VENOUS BLD VENIPUNCTURE: CPT | Performed by: INTERNAL MEDICINE

## 2025-01-01 PROCEDURE — 63600175 PHARM REV CODE 636 W HCPCS: Mod: JZ,TB | Performed by: INTERNAL MEDICINE

## 2025-01-01 PROCEDURE — 83735 ASSAY OF MAGNESIUM: CPT | Performed by: EMERGENCY MEDICINE

## 2025-01-01 PROCEDURE — 63600175 PHARM REV CODE 636 W HCPCS: Performed by: INTERNAL MEDICINE

## 2025-01-01 PROCEDURE — 27100171 HC OXYGEN HIGH FLOW UP TO 24 HOURS

## 2025-01-01 PROCEDURE — 82803 BLOOD GASES ANY COMBINATION: CPT

## 2025-01-01 PROCEDURE — 77014 PR  CT GUIDANCE PLACEMENT RAD THERAPY FIELDS: CPT | Mod: S$GLB,,, | Performed by: RADIOLOGY

## 2025-01-01 PROCEDURE — 83605 ASSAY OF LACTIC ACID: CPT | Performed by: EMERGENCY MEDICINE

## 2025-01-01 PROCEDURE — 93356 MYOCRD STRAIN IMG SPCKL TRCK: CPT | Mod: ,,, | Performed by: INTERNAL MEDICINE

## 2025-01-01 PROCEDURE — S4991 NICOTINE PATCH NONLEGEND: HCPCS | Performed by: INTERNAL MEDICINE

## 2025-01-01 PROCEDURE — 84443 ASSAY THYROID STIM HORMONE: CPT

## 2025-01-01 PROCEDURE — 87205 SMEAR GRAM STAIN: CPT

## 2025-01-01 PROCEDURE — 36600 WITHDRAWAL OF ARTERIAL BLOOD: CPT

## 2025-01-01 PROCEDURE — 87086 URINE CULTURE/COLONY COUNT: CPT | Performed by: INTERNAL MEDICINE

## 2025-01-01 PROCEDURE — 93010 ELECTROCARDIOGRAM REPORT: CPT | Mod: ,,, | Performed by: GENERAL PRACTICE

## 2025-01-01 PROCEDURE — 85007 BL SMEAR W/DIFF WBC COUNT: CPT | Performed by: INTERNAL MEDICINE

## 2025-01-01 PROCEDURE — 85027 COMPLETE CBC AUTOMATED: CPT | Performed by: EMERGENCY MEDICINE

## 2025-01-01 PROCEDURE — 30233N1 TRANSFUSION OF NONAUTOLOGOUS RED BLOOD CELLS INTO PERIPHERAL VEIN, PERCUTANEOUS APPROACH: ICD-10-PCS

## 2025-01-01 PROCEDURE — 25000242 PHARM REV CODE 250 ALT 637 W/ HCPCS: Performed by: STUDENT IN AN ORGANIZED HEALTH CARE EDUCATION/TRAINING PROGRAM

## 2025-01-01 PROCEDURE — 63600175 PHARM REV CODE 636 W HCPCS: Performed by: HOSPITALIST

## 2025-01-01 PROCEDURE — 85027 COMPLETE CBC AUTOMATED: CPT

## 2025-01-01 PROCEDURE — 87641 MR-STAPH DNA AMP PROBE: CPT

## 2025-01-01 PROCEDURE — 83605 ASSAY OF LACTIC ACID: CPT | Performed by: INTERNAL MEDICINE

## 2025-01-01 PROCEDURE — 36415 COLL VENOUS BLD VENIPUNCTURE: CPT | Performed by: EMERGENCY MEDICINE

## 2025-01-01 PROCEDURE — 63600175 PHARM REV CODE 636 W HCPCS: Mod: JZ,JB,TB | Performed by: INTERNAL MEDICINE

## 2025-01-01 PROCEDURE — 63600175 PHARM REV CODE 636 W HCPCS: Performed by: EMERGENCY MEDICINE

## 2025-01-01 PROCEDURE — 25500020 PHARM REV CODE 255

## 2025-01-01 PROCEDURE — 94660 CPAP INITIATION&MGMT: CPT

## 2025-01-01 PROCEDURE — 86920 COMPATIBILITY TEST SPIN: CPT

## 2025-01-01 PROCEDURE — 83735 ASSAY OF MAGNESIUM: CPT | Performed by: HOSPITALIST

## 2025-01-01 PROCEDURE — 93306 TTE W/DOPPLER COMPLETE: CPT | Mod: 26,,, | Performed by: INTERNAL MEDICINE

## 2025-01-01 PROCEDURE — 87040 BLOOD CULTURE FOR BACTERIA: CPT | Performed by: EMERGENCY MEDICINE

## 2025-01-01 PROCEDURE — 99291 CRITICAL CARE FIRST HOUR: CPT | Mod: ,,, | Performed by: INTERNAL MEDICINE

## 2025-01-01 PROCEDURE — 93010 ELECTROCARDIOGRAM REPORT: CPT | Mod: ,,, | Performed by: INTERNAL MEDICINE

## 2025-01-01 PROCEDURE — 25000242 PHARM REV CODE 250 ALT 637 W/ HCPCS: Performed by: EMERGENCY MEDICINE

## 2025-01-01 PROCEDURE — 32556 INSERT CATH PLEURA W/O IMAGE: CPT | Mod: RT,,, | Performed by: INTERNAL MEDICINE

## 2025-01-01 PROCEDURE — 99233 SBSQ HOSP IP/OBS HIGH 50: CPT | Mod: ,,, | Performed by: INTERNAL MEDICINE

## 2025-01-01 PROCEDURE — 36415 COLL VENOUS BLD VENIPUNCTURE: CPT

## 2025-01-01 PROCEDURE — 96374 THER/PROPH/DIAG INJ IV PUSH: CPT

## 2025-01-01 PROCEDURE — 99291 CRITICAL CARE FIRST HOUR: CPT

## 2025-01-01 PROCEDURE — 84132 ASSAY OF SERUM POTASSIUM: CPT | Performed by: HOSPITALIST

## 2025-01-01 PROCEDURE — 0202U NFCT DS 22 TRGT SARS-COV-2: CPT | Performed by: EMERGENCY MEDICINE

## 2025-01-01 PROCEDURE — 99291 CRITICAL CARE FIRST HOUR: CPT | Mod: 25,,, | Performed by: STUDENT IN AN ORGANIZED HEALTH CARE EDUCATION/TRAINING PROGRAM

## 2025-01-01 PROCEDURE — G6015 RADIATION TX DELIVERY IMRT: HCPCS | Mod: S$GLB,,, | Performed by: RADIOLOGY

## 2025-01-01 PROCEDURE — 81003 URINALYSIS AUTO W/O SCOPE: CPT | Performed by: EMERGENCY MEDICINE

## 2025-01-01 PROCEDURE — 5A09357 ASSISTANCE WITH RESPIRATORY VENTILATION, LESS THAN 24 CONSECUTIVE HOURS, CONTINUOUS POSITIVE AIRWAY PRESSURE: ICD-10-PCS

## 2025-01-01 PROCEDURE — 99232 SBSQ HOSP IP/OBS MODERATE 35: CPT | Mod: ,,, | Performed by: STUDENT IN AN ORGANIZED HEALTH CARE EDUCATION/TRAINING PROGRAM

## 2025-01-01 PROCEDURE — 99223 1ST HOSP IP/OBS HIGH 75: CPT | Mod: ,,, | Performed by: INTERNAL MEDICINE

## 2025-01-01 PROCEDURE — 84145 PROCALCITONIN (PCT): CPT | Performed by: EMERGENCY MEDICINE

## 2025-01-01 PROCEDURE — 80053 COMPREHEN METABOLIC PANEL: CPT

## 2025-01-01 PROCEDURE — 84484 ASSAY OF TROPONIN QUANT: CPT | Performed by: EMERGENCY MEDICINE

## 2025-01-01 PROCEDURE — 93005 ELECTROCARDIOGRAM TRACING: CPT | Performed by: GENERAL PRACTICE

## 2025-01-01 RX ORDER — POTASSIUM CHLORIDE 14.9 MG/ML
60 INJECTION INTRAVENOUS
Status: DISCONTINUED | OUTPATIENT
Start: 2025-01-01 | End: 2025-01-01 | Stop reason: HOSPADM

## 2025-01-01 RX ORDER — LEVALBUTEROL INHALATION SOLUTION 1.25 MG/3ML
1.25 SOLUTION RESPIRATORY (INHALATION) EVERY 4 HOURS
Status: DISCONTINUED | OUTPATIENT
Start: 2025-01-01 | End: 2025-01-01

## 2025-01-01 RX ORDER — NOREPINEPHRINE BITARTRATE/D5W 4MG/250ML
0-3 PLASTIC BAG, INJECTION (ML) INTRAVENOUS CONTINUOUS
Status: DISCONTINUED | OUTPATIENT
Start: 2025-01-01 | End: 2025-01-01 | Stop reason: HOSPADM

## 2025-01-01 RX ORDER — ACETAMINOPHEN 325 MG/1
650 TABLET ORAL EVERY 4 HOURS PRN
Status: DISCONTINUED | OUTPATIENT
Start: 2025-01-01 | End: 2025-01-01 | Stop reason: HOSPADM

## 2025-01-01 RX ORDER — LORAZEPAM 2 MG/ML
1 INJECTION INTRAMUSCULAR EVERY 6 HOURS PRN
Status: DISCONTINUED | OUTPATIENT
Start: 2025-01-01 | End: 2025-01-01 | Stop reason: HOSPADM

## 2025-01-01 RX ORDER — GUAIFENESIN 600 MG/1
600 TABLET, EXTENDED RELEASE ORAL 2 TIMES DAILY
Status: DISCONTINUED | OUTPATIENT
Start: 2025-01-01 | End: 2025-01-01 | Stop reason: HOSPADM

## 2025-01-01 RX ORDER — CEFEPIME HYDROCHLORIDE 2 G/1
2 INJECTION, POWDER, FOR SOLUTION INTRAVENOUS ONCE
Status: COMPLETED | OUTPATIENT
Start: 2025-01-01 | End: 2025-01-01

## 2025-01-01 RX ORDER — LEVALBUTEROL INHALATION SOLUTION 1.25 MG/3ML
1.25 SOLUTION RESPIRATORY (INHALATION) EVERY 4 HOURS
Status: DISCONTINUED | OUTPATIENT
Start: 2025-01-01 | End: 2025-01-01 | Stop reason: HOSPADM

## 2025-01-01 RX ORDER — SODIUM CHLORIDE 9 MG/ML
INJECTION, SOLUTION INTRAVENOUS CONTINUOUS
Status: ACTIVE | OUTPATIENT
Start: 2025-01-01 | End: 2025-01-01

## 2025-01-01 RX ORDER — IPRATROPIUM BROMIDE AND ALBUTEROL SULFATE 2.5; .5 MG/3ML; MG/3ML
3 SOLUTION RESPIRATORY (INHALATION) ONCE
Status: DISCONTINUED | OUTPATIENT
Start: 2025-01-01 | End: 2025-01-01

## 2025-01-01 RX ORDER — POTASSIUM CHLORIDE 29.8 MG/ML
80 INJECTION INTRAVENOUS
Status: DISCONTINUED | OUTPATIENT
Start: 2025-01-01 | End: 2025-01-01 | Stop reason: HOSPADM

## 2025-01-01 RX ORDER — IPRATROPIUM BROMIDE 0.5 MG/2.5ML
0.5 SOLUTION RESPIRATORY (INHALATION) EVERY 8 HOURS
Status: DISCONTINUED | OUTPATIENT
Start: 2025-01-01 | End: 2025-01-01

## 2025-01-01 RX ORDER — METHYLPREDNISOLONE SOD SUCC 125 MG
125 VIAL (EA) INJECTION
Status: COMPLETED | OUTPATIENT
Start: 2025-01-01 | End: 2025-01-01

## 2025-01-01 RX ORDER — LORAZEPAM 2 MG/ML
1 INJECTION INTRAMUSCULAR ONCE
Status: COMPLETED | OUTPATIENT
Start: 2025-01-01 | End: 2025-01-01

## 2025-01-01 RX ORDER — METOPROLOL TARTRATE 1 MG/ML
5 INJECTION, SOLUTION INTRAVENOUS ONCE
Status: COMPLETED | OUTPATIENT
Start: 2025-01-01 | End: 2025-01-01

## 2025-01-01 RX ORDER — NITROGLYCERIN 0.4 MG/1
0.4 TABLET SUBLINGUAL EVERY 5 MIN PRN
Status: DISCONTINUED | OUTPATIENT
Start: 2025-01-01 | End: 2025-01-01 | Stop reason: HOSPADM

## 2025-01-01 RX ORDER — MUPIROCIN 20 MG/G
OINTMENT TOPICAL 2 TIMES DAILY
Status: DISPENSED | OUTPATIENT
Start: 2025-01-01 | End: 2025-01-01

## 2025-01-01 RX ORDER — MAGNESIUM SULFATE HEPTAHYDRATE 40 MG/ML
2 INJECTION, SOLUTION INTRAVENOUS
Status: DISCONTINUED | OUTPATIENT
Start: 2025-01-01 | End: 2025-01-01 | Stop reason: HOSPADM

## 2025-01-01 RX ORDER — MORPHINE SULFATE 4 MG/ML
4 INJECTION, SOLUTION INTRAMUSCULAR; INTRAVENOUS EVERY 4 HOURS PRN
Status: DISCONTINUED | OUTPATIENT
Start: 2025-01-01 | End: 2025-01-01

## 2025-01-01 RX ORDER — MIDAZOLAM HYDROCHLORIDE 2 MG/2ML
INJECTION, SOLUTION INTRAMUSCULAR; INTRAVENOUS
Status: COMPLETED
Start: 2025-01-01 | End: 2025-01-01

## 2025-01-01 RX ORDER — ENOXAPARIN SODIUM 100 MG/ML
1 INJECTION SUBCUTANEOUS EVERY 12 HOURS
Status: DISCONTINUED | OUTPATIENT
Start: 2025-01-01 | End: 2025-01-01 | Stop reason: HOSPADM

## 2025-01-01 RX ORDER — ALPRAZOLAM 0.5 MG/1
0.5 TABLET ORAL 3 TIMES DAILY PRN
Status: DISCONTINUED | OUTPATIENT
Start: 2025-01-01 | End: 2025-01-01

## 2025-01-01 RX ORDER — NALOXONE HCL 0.4 MG/ML
0.02 VIAL (ML) INJECTION
Status: DISCONTINUED | OUTPATIENT
Start: 2025-01-01 | End: 2025-01-01 | Stop reason: HOSPADM

## 2025-01-01 RX ORDER — SODIUM,POTASSIUM PHOSPHATES 280-250MG
2 POWDER IN PACKET (EA) ORAL
Status: DISCONTINUED | OUTPATIENT
Start: 2025-01-01 | End: 2025-01-01 | Stop reason: HOSPADM

## 2025-01-01 RX ORDER — ENOXAPARIN SODIUM 300 MG/3ML
10 INJECTION INTRAVENOUS; SUBCUTANEOUS ONCE
Status: DISCONTINUED | OUTPATIENT
Start: 2025-01-01 | End: 2025-01-01

## 2025-01-01 RX ORDER — FENTANYL CITRATE 50 UG/ML
INJECTION, SOLUTION INTRAMUSCULAR; INTRAVENOUS
Status: COMPLETED
Start: 2025-01-01 | End: 2025-01-01

## 2025-01-01 RX ORDER — MEGESTROL ACETATE 20 MG/1
40 TABLET ORAL 2 TIMES DAILY
Status: DISCONTINUED | OUTPATIENT
Start: 2025-01-01 | End: 2025-01-01 | Stop reason: HOSPADM

## 2025-01-01 RX ORDER — ASPIRIN 81 MG/1
81 TABLET ORAL DAILY
Status: DISCONTINUED | OUTPATIENT
Start: 2025-01-01 | End: 2025-01-01 | Stop reason: HOSPADM

## 2025-01-01 RX ORDER — TALC
6 POWDER (GRAM) TOPICAL NIGHTLY PRN
Status: DISCONTINUED | OUTPATIENT
Start: 2025-01-01 | End: 2025-01-01 | Stop reason: HOSPADM

## 2025-01-01 RX ORDER — PANTOPRAZOLE SODIUM 40 MG/10ML
40 INJECTION, POWDER, LYOPHILIZED, FOR SOLUTION INTRAVENOUS DAILY
Status: DISCONTINUED | OUTPATIENT
Start: 2025-01-01 | End: 2025-01-01

## 2025-01-01 RX ORDER — HYDROCODONE BITARTRATE AND ACETAMINOPHEN 500; 5 MG/1; MG/1
TABLET ORAL
Status: DISCONTINUED | OUTPATIENT
Start: 2025-01-01 | End: 2025-01-01 | Stop reason: HOSPADM

## 2025-01-01 RX ORDER — CALCIUM GLUCONATE 20 MG/ML
2 INJECTION, SOLUTION INTRAVENOUS
Status: DISCONTINUED | OUTPATIENT
Start: 2025-01-01 | End: 2025-01-01 | Stop reason: HOSPADM

## 2025-01-01 RX ORDER — LANOLIN ALCOHOL/MO/W.PET/CERES
800 CREAM (GRAM) TOPICAL
Status: DISCONTINUED | OUTPATIENT
Start: 2025-01-01 | End: 2025-01-01 | Stop reason: HOSPADM

## 2025-01-01 RX ORDER — POTASSIUM CHLORIDE 29.8 MG/ML
40 INJECTION INTRAVENOUS
Status: DISCONTINUED | OUTPATIENT
Start: 2025-01-01 | End: 2025-01-01 | Stop reason: HOSPADM

## 2025-01-01 RX ORDER — IPRATROPIUM BROMIDE 0.5 MG/2.5ML
1 SOLUTION RESPIRATORY (INHALATION) CONTINUOUS
Status: DISCONTINUED | OUTPATIENT
Start: 2025-01-01 | End: 2025-01-01

## 2025-01-01 RX ORDER — ONDANSETRON HYDROCHLORIDE 2 MG/ML
4 INJECTION, SOLUTION INTRAVENOUS EVERY 6 HOURS PRN
Status: DISCONTINUED | OUTPATIENT
Start: 2025-01-01 | End: 2025-01-01 | Stop reason: HOSPADM

## 2025-01-01 RX ORDER — MORPHINE SULFATE 2 MG/ML
INJECTION, SOLUTION INTRAMUSCULAR; INTRAVENOUS
Status: DISPENSED
Start: 2025-01-01 | End: 2025-01-01

## 2025-01-01 RX ORDER — METOPROLOL TARTRATE 25 MG/1
12.5 TABLET ORAL 2 TIMES DAILY
Status: DISCONTINUED | OUTPATIENT
Start: 2025-01-01 | End: 2025-01-01 | Stop reason: HOSPADM

## 2025-01-01 RX ORDER — MORPHINE SULFATE 2 MG/ML
0.5 INJECTION, SOLUTION INTRAMUSCULAR; INTRAVENOUS ONCE
Status: COMPLETED | OUTPATIENT
Start: 2025-01-01 | End: 2025-01-01

## 2025-01-01 RX ORDER — ALUMINUM HYDROXIDE, MAGNESIUM HYDROXIDE, AND SIMETHICONE 1200; 120; 1200 MG/30ML; MG/30ML; MG/30ML
30 SUSPENSION ORAL 4 TIMES DAILY PRN
Status: DISCONTINUED | OUTPATIENT
Start: 2025-01-01 | End: 2025-01-01 | Stop reason: HOSPADM

## 2025-01-01 RX ORDER — CALCIUM GLUCONATE 20 MG/ML
3 INJECTION, SOLUTION INTRAVENOUS
Status: DISCONTINUED | OUTPATIENT
Start: 2025-01-01 | End: 2025-01-01 | Stop reason: HOSPADM

## 2025-01-01 RX ORDER — AZITHROMYCIN 250 MG/1
500 TABLET, FILM COATED ORAL DAILY
Status: DISCONTINUED | OUTPATIENT
Start: 2025-01-01 | End: 2025-01-01

## 2025-01-01 RX ORDER — IPRATROPIUM BROMIDE AND ALBUTEROL SULFATE 2.5; .5 MG/3ML; MG/3ML
3 SOLUTION RESPIRATORY (INHALATION) EVERY 4 HOURS PRN
Status: DISCONTINUED | OUTPATIENT
Start: 2025-01-01 | End: 2025-01-01 | Stop reason: HOSPADM

## 2025-01-01 RX ORDER — LEVALBUTEROL INHALATION SOLUTION 1.25 MG/3ML
1.25 SOLUTION RESPIRATORY (INHALATION) ONCE
Status: COMPLETED | OUTPATIENT
Start: 2025-01-01 | End: 2025-01-01

## 2025-01-01 RX ORDER — LEVALBUTEROL INHALATION SOLUTION 1.25 MG/3ML
3.75 SOLUTION RESPIRATORY (INHALATION)
Status: COMPLETED | OUTPATIENT
Start: 2025-01-01 | End: 2025-01-01

## 2025-01-01 RX ORDER — NITROGLYCERIN 0.4 MG/1
TABLET SUBLINGUAL
Status: DISPENSED
Start: 2025-01-01 | End: 2025-01-01

## 2025-01-01 RX ORDER — HYDROCODONE BITARTRATE AND ACETAMINOPHEN 5; 325 MG/1; MG/1
1 TABLET ORAL EVERY 6 HOURS PRN
Refills: 0 | Status: DISCONTINUED | OUTPATIENT
Start: 2025-01-01 | End: 2025-01-01 | Stop reason: HOSPADM

## 2025-01-01 RX ORDER — CEFEPIME HYDROCHLORIDE 2 G/1
2 INJECTION, POWDER, FOR SOLUTION INTRAVENOUS
Status: DISCONTINUED | OUTPATIENT
Start: 2025-01-01 | End: 2025-01-01 | Stop reason: HOSPADM

## 2025-01-01 RX ORDER — MORPHINE SULFATE 2 MG/ML
2 INJECTION, SOLUTION INTRAMUSCULAR; INTRAVENOUS
Status: DISCONTINUED | OUTPATIENT
Start: 2025-01-01 | End: 2025-01-01 | Stop reason: HOSPADM

## 2025-01-01 RX ORDER — LEVALBUTEROL INHALATION SOLUTION 1.25 MG/3ML
1.25 SOLUTION RESPIRATORY (INHALATION) EVERY 8 HOURS
Status: DISCONTINUED | OUTPATIENT
Start: 2025-01-01 | End: 2025-01-01

## 2025-01-01 RX ORDER — IPRATROPIUM BROMIDE 0.5 MG/2.5ML
0.5 SOLUTION RESPIRATORY (INHALATION) ONCE
Status: COMPLETED | OUTPATIENT
Start: 2025-01-01 | End: 2025-01-01

## 2025-01-01 RX ORDER — CALCIUM GLUCONATE 20 MG/ML
1 INJECTION, SOLUTION INTRAVENOUS
Status: DISCONTINUED | OUTPATIENT
Start: 2025-01-01 | End: 2025-01-01 | Stop reason: HOSPADM

## 2025-01-01 RX ORDER — IPRATROPIUM BROMIDE AND ALBUTEROL SULFATE 2.5; .5 MG/3ML; MG/3ML
3 SOLUTION RESPIRATORY (INHALATION) ONCE
Status: COMPLETED | OUTPATIENT
Start: 2025-01-01 | End: 2025-01-01

## 2025-01-01 RX ORDER — IBUPROFEN 200 MG
1 TABLET ORAL DAILY
Status: DISCONTINUED | OUTPATIENT
Start: 2025-01-01 | End: 2025-01-01 | Stop reason: HOSPADM

## 2025-01-01 RX ORDER — PREDNISONE 20 MG/1
40 TABLET ORAL DAILY
Status: DISCONTINUED | OUTPATIENT
Start: 2025-01-01 | End: 2025-01-01

## 2025-01-01 RX ORDER — SODIUM CHLORIDE 1 G/1
1000 TABLET ORAL 2 TIMES DAILY
Status: DISCONTINUED | OUTPATIENT
Start: 2025-01-01 | End: 2025-01-01 | Stop reason: HOSPADM

## 2025-01-01 RX ORDER — NOREPINEPHRINE BITARTRATE/D5W 4MG/250ML
PLASTIC BAG, INJECTION (ML) INTRAVENOUS
Status: COMPLETED
Start: 2025-01-01 | End: 2025-01-01

## 2025-01-01 RX ORDER — POLYETHYLENE GLYCOL 3350 17 G/17G
17 POWDER, FOR SOLUTION ORAL DAILY
Status: DISCONTINUED | OUTPATIENT
Start: 2025-01-01 | End: 2025-01-01 | Stop reason: HOSPADM

## 2025-01-01 RX ORDER — MORPHINE SULFATE 2 MG/ML
1 INJECTION, SOLUTION INTRAMUSCULAR; INTRAVENOUS EVERY 6 HOURS PRN
Status: DISCONTINUED | OUTPATIENT
Start: 2025-01-01 | End: 2025-01-01 | Stop reason: HOSPADM

## 2025-01-01 RX ORDER — FUROSEMIDE 10 MG/ML
40 INJECTION INTRAMUSCULAR; INTRAVENOUS EVERY 12 HOURS
Status: DISCONTINUED | OUTPATIENT
Start: 2025-01-01 | End: 2025-01-01 | Stop reason: HOSPADM

## 2025-01-01 RX ORDER — PANTOPRAZOLE SODIUM 40 MG/1
40 TABLET, DELAYED RELEASE ORAL
Status: DISCONTINUED | OUTPATIENT
Start: 2025-01-01 | End: 2025-01-01 | Stop reason: HOSPADM

## 2025-01-01 RX ORDER — SODIUM CHLORIDE 9 MG/ML
INJECTION, SOLUTION INTRAVENOUS CONTINUOUS
Status: DISCONTINUED | OUTPATIENT
Start: 2025-01-01 | End: 2025-01-01

## 2025-01-01 RX ORDER — ENOXAPARIN SODIUM 100 MG/ML
40 INJECTION SUBCUTANEOUS EVERY 24 HOURS
Status: DISCONTINUED | OUTPATIENT
Start: 2025-01-01 | End: 2025-01-01

## 2025-01-01 RX ORDER — ACETAMINOPHEN 325 MG/1
650 TABLET ORAL EVERY 8 HOURS PRN
Status: DISCONTINUED | OUTPATIENT
Start: 2025-01-01 | End: 2025-01-01 | Stop reason: HOSPADM

## 2025-01-01 RX ORDER — IPRATROPIUM BROMIDE 0.5 MG/2.5ML
0.5 SOLUTION RESPIRATORY (INHALATION) EVERY 4 HOURS
Status: DISCONTINUED | OUTPATIENT
Start: 2025-01-01 | End: 2025-01-01 | Stop reason: HOSPADM

## 2025-01-01 RX ORDER — MORPHINE SULFATE 2 MG/ML
6 INJECTION, SOLUTION INTRAMUSCULAR; INTRAVENOUS EVERY 4 HOURS PRN
Status: DISCONTINUED | OUTPATIENT
Start: 2025-01-01 | End: 2025-01-01

## 2025-01-01 RX ADMIN — MORPHINE SULFATE 1 MG: 2 INJECTION, SOLUTION INTRAMUSCULAR; INTRAVENOUS at 12:08

## 2025-01-01 RX ADMIN — METHYLPREDNISOLONE SODIUM SUCCINATE 40 MG: 40 INJECTION, POWDER, FOR SOLUTION INTRAMUSCULAR; INTRAVENOUS at 08:08

## 2025-01-01 RX ADMIN — MORPHINE SULFATE 1 MG: 2 INJECTION, SOLUTION INTRAMUSCULAR; INTRAVENOUS at 02:08

## 2025-01-01 RX ADMIN — MORPHINE SULFATE 4 MG: 4 INJECTION, SOLUTION INTRAMUSCULAR; INTRAVENOUS at 03:08

## 2025-01-01 RX ADMIN — CEFEPIME 2 G: 2 INJECTION, POWDER, FOR SOLUTION INTRAVENOUS at 10:08

## 2025-01-01 RX ADMIN — LEVALBUTEROL HYDROCHLORIDE 1.25 MG: 1.25 SOLUTION RESPIRATORY (INHALATION) at 03:08

## 2025-01-01 RX ADMIN — Medication 800 MG: at 09:08

## 2025-01-01 RX ADMIN — IPRATROPIUM BROMIDE 0.5 MG: 0.5 SOLUTION RESPIRATORY (INHALATION) at 07:08

## 2025-01-01 RX ADMIN — IPRATROPIUM BROMIDE 0.5 MG: 0.5 SOLUTION RESPIRATORY (INHALATION) at 11:08

## 2025-01-01 RX ADMIN — CEFEPIME 2 G: 2 INJECTION, POWDER, FOR SOLUTION INTRAVENOUS at 01:08

## 2025-01-01 RX ADMIN — LEVALBUTEROL HYDROCHLORIDE 1.25 MG: 1.25 SOLUTION RESPIRATORY (INHALATION) at 11:08

## 2025-01-01 RX ADMIN — LEVALBUTEROL HYDROCHLORIDE 1.25 MG: 1.25 SOLUTION RESPIRATORY (INHALATION) at 06:08

## 2025-01-01 RX ADMIN — MORPHINE SULFATE 6 MG: 2 INJECTION, SOLUTION INTRAMUSCULAR; INTRAVENOUS at 06:08

## 2025-01-01 RX ADMIN — GUAIFENESIN 600 MG: 600 TABLET, EXTENDED RELEASE ORAL at 10:08

## 2025-01-01 RX ADMIN — IPRATROPIUM BROMIDE AND ALBUTEROL SULFATE 3 ML: 2.5; .5 SOLUTION RESPIRATORY (INHALATION) at 09:08

## 2025-01-01 RX ADMIN — LORAZEPAM 1 MG: 2 INJECTION INTRAMUSCULAR; INTRAVENOUS at 04:08

## 2025-01-01 RX ADMIN — MEGESTROL ACETATE 40 MG: 20 TABLET ORAL at 08:08

## 2025-01-01 RX ADMIN — LEVALBUTEROL HYDROCHLORIDE 1.25 MG: 1.25 SOLUTION RESPIRATORY (INHALATION) at 08:08

## 2025-01-01 RX ADMIN — MORPHINE SULFATE 2 MG: 2 INJECTION, SOLUTION INTRAMUSCULAR; INTRAVENOUS at 08:08

## 2025-01-01 RX ADMIN — ENOXAPARIN SODIUM 50 MG: 60 INJECTION SUBCUTANEOUS at 03:08

## 2025-01-01 RX ADMIN — SODIUM CHLORIDE, POTASSIUM CHLORIDE, SODIUM LACTATE AND CALCIUM CHLORIDE 1572 ML: 600; 310; 30; 20 INJECTION, SOLUTION INTRAVENOUS at 10:08

## 2025-01-01 RX ADMIN — POLYETHYLENE GLYCOL 3350 17 G: 17 POWDER, FOR SOLUTION ORAL at 08:08

## 2025-01-01 RX ADMIN — METHYLPREDNISOLONE SODIUM SUCCINATE 40 MG: 40 INJECTION, POWDER, FOR SOLUTION INTRAMUSCULAR; INTRAVENOUS at 01:08

## 2025-01-01 RX ADMIN — ALPRAZOLAM 0.5 MG: 0.5 TABLET ORAL at 06:08

## 2025-01-01 RX ADMIN — FUROSEMIDE 40 MG: 10 INJECTION, SOLUTION INTRAMUSCULAR; INTRAVENOUS at 01:08

## 2025-01-01 RX ADMIN — POLYETHYLENE GLYCOL 3350 17 G: 17 POWDER, FOR SOLUTION ORAL at 10:08

## 2025-01-01 RX ADMIN — IPRATROPIUM BROMIDE 0.5 MG: 0.5 SOLUTION RESPIRATORY (INHALATION) at 03:08

## 2025-01-01 RX ADMIN — Medication 6 MG: at 08:08

## 2025-01-01 RX ADMIN — NOREPINEPHRINE BITARTRATE 0.02 MCG/KG/MIN: 4 INJECTION, SOLUTION INTRAVENOUS at 08:08

## 2025-01-01 RX ADMIN — LEVALBUTEROL HYDROCHLORIDE 1.25 MG: 1.25 SOLUTION RESPIRATORY (INHALATION) at 10:08

## 2025-01-01 RX ADMIN — SODIUM CHLORIDE, POTASSIUM CHLORIDE, SODIUM LACTATE AND CALCIUM CHLORIDE 1572 ML: 600; 310; 30; 20 INJECTION, SOLUTION INTRAVENOUS at 11:08

## 2025-01-01 RX ADMIN — METHYLPREDNISOLONE SODIUM SUCCINATE 40 MG: 40 INJECTION, POWDER, FOR SOLUTION INTRAMUSCULAR; INTRAVENOUS at 06:08

## 2025-01-01 RX ADMIN — LEVALBUTEROL HYDROCHLORIDE 1.25 MG: 1.25 SOLUTION RESPIRATORY (INHALATION) at 07:08

## 2025-01-01 RX ADMIN — METHYLPREDNISOLONE SODIUM SUCCINATE 40 MG: 40 INJECTION, POWDER, FOR SOLUTION INTRAMUSCULAR; INTRAVENOUS at 05:08

## 2025-01-01 RX ADMIN — CEFEPIME 2 G: 2 INJECTION, POWDER, FOR SOLUTION INTRAVENOUS at 06:08

## 2025-01-01 RX ADMIN — METHYLPREDNISOLONE SODIUM SUCCINATE 40 MG: 40 INJECTION, POWDER, FOR SOLUTION INTRAMUSCULAR; INTRAVENOUS at 04:08

## 2025-01-01 RX ADMIN — ASPIRIN 81 MG: 81 TABLET, DELAYED RELEASE ORAL at 12:08

## 2025-01-01 RX ADMIN — ASPIRIN 81 MG: 81 TABLET, DELAYED RELEASE ORAL at 10:08

## 2025-01-01 RX ADMIN — FUROSEMIDE 40 MG: 10 INJECTION, SOLUTION INTRAMUSCULAR; INTRAVENOUS at 11:08

## 2025-01-01 RX ADMIN — METHYLPREDNISOLONE SODIUM SUCCINATE 40 MG: 40 INJECTION, POWDER, FOR SOLUTION INTRAMUSCULAR; INTRAVENOUS at 11:08

## 2025-01-01 RX ADMIN — ALPRAZOLAM 0.5 MG: 0.5 TABLET ORAL at 10:08

## 2025-01-01 RX ADMIN — METHYLPREDNISOLONE SODIUM SUCCINATE 125 MG: 125 INJECTION, POWDER, FOR SOLUTION INTRAMUSCULAR; INTRAVENOUS at 10:08

## 2025-01-01 RX ADMIN — IPRATROPIUM BROMIDE 0.5 MG: 0.5 SOLUTION RESPIRATORY (INHALATION) at 02:08

## 2025-01-01 RX ADMIN — NICOTINE 1 PATCH: 14 PATCH, EXTENDED RELEASE TRANSDERMAL at 06:08

## 2025-01-01 RX ADMIN — LEVALBUTEROL HYDROCHLORIDE 3.75 MG: 1.25 SOLUTION RESPIRATORY (INHALATION) at 09:08

## 2025-01-01 RX ADMIN — IPRATROPIUM BROMIDE 0.5 MG: 0.5 SOLUTION RESPIRATORY (INHALATION) at 10:08

## 2025-01-01 RX ADMIN — CEFEPIME 2 G: 2 INJECTION, POWDER, FOR SOLUTION INTRAVENOUS at 03:08

## 2025-01-01 RX ADMIN — MORPHINE SULFATE 2 MG: 2 INJECTION, SOLUTION INTRAMUSCULAR; INTRAVENOUS at 06:08

## 2025-01-01 RX ADMIN — METHYLPREDNISOLONE SODIUM SUCCINATE 40 MG: 40 INJECTION, POWDER, FOR SOLUTION INTRAMUSCULAR; INTRAVENOUS at 10:08

## 2025-01-01 RX ADMIN — ENOXAPARIN SODIUM 50 MG: 60 INJECTION SUBCUTANEOUS at 04:08

## 2025-01-01 RX ADMIN — MORPHINE SULFATE 2 MG: 2 INJECTION, SOLUTION INTRAMUSCULAR; INTRAVENOUS at 02:08

## 2025-01-01 RX ADMIN — MUPIROCIN 1 G: 20 OINTMENT TOPICAL at 08:08

## 2025-01-01 RX ADMIN — FENTANYL CITRATE 100 MCG: 50 INJECTION INTRAMUSCULAR; INTRAVENOUS at 11:08

## 2025-01-01 RX ADMIN — Medication 800 MG: at 12:08

## 2025-01-01 RX ADMIN — MORPHINE SULFATE 2 MG: 2 INJECTION, SOLUTION INTRAMUSCULAR; INTRAVENOUS at 10:08

## 2025-01-01 RX ADMIN — MEGESTROL ACETATE 40 MG: 20 TABLET ORAL at 10:08

## 2025-01-01 RX ADMIN — IOHEXOL 100 ML: 350 INJECTION, SOLUTION INTRAVENOUS at 02:08

## 2025-01-01 RX ADMIN — METOPROLOL TARTRATE 5 MG: 1 INJECTION, SOLUTION INTRAVENOUS at 11:08

## 2025-01-01 RX ADMIN — SODIUM CHLORIDE 1000 MG: 1 TABLET ORAL at 08:08

## 2025-01-01 RX ADMIN — IPRATROPIUM BROMIDE 0.5 MG: 0.5 SOLUTION RESPIRATORY (INHALATION) at 08:08

## 2025-01-01 RX ADMIN — IPRATROPIUM BROMIDE 0.5 MG: 0.5 SOLUTION RESPIRATORY (INHALATION) at 06:08

## 2025-01-01 RX ADMIN — Medication 6 MG: at 10:08

## 2025-01-01 RX ADMIN — IPRATROPIUM BROMIDE AND ALBUTEROL SULFATE 3 ML: 2.5; .5 SOLUTION RESPIRATORY (INHALATION) at 03:08

## 2025-01-01 RX ADMIN — ALPRAZOLAM 0.5 MG: 0.5 TABLET ORAL at 11:08

## 2025-01-01 RX ADMIN — FUROSEMIDE 40 MG: 10 INJECTION, SOLUTION INTRAMUSCULAR; INTRAVENOUS at 10:08

## 2025-01-01 RX ADMIN — VANCOMYCIN HYDROCHLORIDE 1000 MG: 1 INJECTION, POWDER, LYOPHILIZED, FOR SOLUTION INTRAVENOUS at 03:08

## 2025-01-01 RX ADMIN — PANTOPRAZOLE SODIUM 40 MG: 40 INJECTION, POWDER, FOR SOLUTION INTRAVENOUS at 08:08

## 2025-01-01 RX ADMIN — NICOTINE 1 PATCH: 14 PATCH, EXTENDED RELEASE TRANSDERMAL at 04:08

## 2025-01-01 RX ADMIN — MUPIROCIN 1 G: 20 OINTMENT TOPICAL at 10:08

## 2025-01-01 RX ADMIN — NICOTINE 1 PATCH: 14 PATCH, EXTENDED RELEASE TRANSDERMAL at 05:08

## 2025-01-01 RX ADMIN — METOPROLOL TARTRATE 12.5 MG: 25 TABLET, FILM COATED ORAL at 10:08

## 2025-01-01 RX ADMIN — MORPHINE SULFATE 2 MG: 2 INJECTION, SOLUTION INTRAMUSCULAR; INTRAVENOUS at 12:08

## 2025-01-01 RX ADMIN — METOPROLOL TARTRATE 12.5 MG: 25 TABLET, FILM COATED ORAL at 08:08

## 2025-01-01 RX ADMIN — MAGNESIUM SULFATE HEPTAHYDRATE 2 G: 40 INJECTION, SOLUTION INTRAVENOUS at 08:08

## 2025-01-01 RX ADMIN — POTASSIUM BICARBONATE 50 MEQ: 977.5 TABLET, EFFERVESCENT ORAL at 04:08

## 2025-01-01 RX ADMIN — CEFEPIME 2 G: 2 INJECTION, POWDER, FOR SOLUTION INTRAVENOUS at 12:08

## 2025-01-01 RX ADMIN — AZITHROMYCIN DIHYDRATE 500 MG: 250 TABLET, FILM COATED ORAL at 12:08

## 2025-01-01 RX ADMIN — METOPROLOL TARTRATE 12.5 MG: 25 TABLET, FILM COATED ORAL at 05:08

## 2025-01-01 RX ADMIN — AZITHROMYCIN DIHYDRATE 500 MG: 250 TABLET, FILM COATED ORAL at 08:08

## 2025-01-01 RX ADMIN — CEFEPIME 2 G: 2 INJECTION, POWDER, FOR SOLUTION INTRAVENOUS at 05:08

## 2025-01-01 RX ADMIN — GUAIFENESIN 600 MG: 600 TABLET, EXTENDED RELEASE ORAL at 08:08

## 2025-01-01 RX ADMIN — CEFEPIME 2 G: 2 INJECTION, POWDER, FOR SOLUTION INTRAVENOUS at 08:08

## 2025-01-01 RX ADMIN — MORPHINE SULFATE 0.5 MG: 2 INJECTION, SOLUTION INTRAMUSCULAR; INTRAVENOUS at 11:08

## 2025-01-01 RX ADMIN — MORPHINE SULFATE 4 MG: 4 INJECTION, SOLUTION INTRAMUSCULAR; INTRAVENOUS at 10:08

## 2025-01-01 RX ADMIN — MORPHINE SULFATE 4 MG: 4 INJECTION, SOLUTION INTRAMUSCULAR; INTRAVENOUS at 04:08

## 2025-01-01 RX ADMIN — FILGRASTIM-SNDZ 480 MCG: 480 INJECTION, SOLUTION INTRAVENOUS; SUBCUTANEOUS at 10:08

## 2025-01-01 RX ADMIN — IPRATROPIUM BROMIDE 1 MG: 0.5 SOLUTION RESPIRATORY (INHALATION) at 09:08

## 2025-01-01 RX ADMIN — Medication 800 MG: at 04:08

## 2025-01-01 RX ADMIN — ASPIRIN 81 MG: 81 TABLET, DELAYED RELEASE ORAL at 08:08

## 2025-01-01 RX ADMIN — LORAZEPAM 1 MG: 2 INJECTION INTRAMUSCULAR; INTRAVENOUS at 06:08

## 2025-01-01 RX ADMIN — NITROGLYCERIN 0.4 MG: 0.4 TABLET SUBLINGUAL at 12:08

## 2025-01-01 RX ADMIN — FILGRASTIM-SNDZ 480 MCG: 480 INJECTION, SOLUTION INTRAVENOUS; SUBCUTANEOUS at 03:08

## 2025-01-01 RX ADMIN — PANTOPRAZOLE SODIUM 40 MG: 40 INJECTION, POWDER, FOR SOLUTION INTRAVENOUS at 01:08

## 2025-01-01 RX ADMIN — SODIUM CHLORIDE 1000 MG: 1 TABLET ORAL at 10:08

## 2025-01-01 RX ADMIN — LEVALBUTEROL HYDROCHLORIDE 1.25 MG: 1.25 SOLUTION RESPIRATORY (INHALATION) at 02:08

## 2025-01-01 RX ADMIN — AZITHROMYCIN DIHYDRATE 500 MG: 250 TABLET, FILM COATED ORAL at 10:08

## 2025-01-01 RX ADMIN — SODIUM CHLORIDE: 9 INJECTION, SOLUTION INTRAVENOUS at 12:08

## 2025-01-01 RX ADMIN — CEFEPIME 2 G: 2 INJECTION, POWDER, FOR SOLUTION INTRAVENOUS at 02:08

## 2025-01-01 RX ADMIN — MORPHINE SULFATE 2 MG: 2 INJECTION, SOLUTION INTRAMUSCULAR; INTRAVENOUS at 04:08

## 2025-01-01 RX ADMIN — IPRATROPIUM BROMIDE AND ALBUTEROL SULFATE 3 ML: 2.5; .5 SOLUTION RESPIRATORY (INHALATION) at 11:08

## 2025-01-01 RX ADMIN — Medication 800 MG: at 08:08

## 2025-01-01 RX ADMIN — ALPRAZOLAM 0.5 MG: 0.5 TABLET ORAL at 03:08

## 2025-01-01 RX ADMIN — Medication 0.02 MCG/KG/MIN: at 08:08

## 2025-01-01 RX ADMIN — POTASSIUM BICARBONATE 50 MEQ: 977.5 TABLET, EFFERVESCENT ORAL at 05:08

## 2025-01-01 RX ADMIN — MUPIROCIN 1 G: 20 OINTMENT TOPICAL at 12:08

## 2025-01-01 RX ADMIN — METHYLPREDNISOLONE SODIUM SUCCINATE 40 MG: 40 INJECTION, POWDER, FOR SOLUTION INTRAMUSCULAR; INTRAVENOUS at 03:08

## 2025-02-18 DIAGNOSIS — J44.9 COPD (CHRONIC OBSTRUCTIVE PULMONARY DISEASE): Primary | ICD-10-CM

## 2025-02-26 ENCOUNTER — HOSPITAL ENCOUNTER (OUTPATIENT)
Dept: PULMONOLOGY | Facility: HOSPITAL | Age: 65
Discharge: HOME | End: 2025-02-26
Attending: NURSE PRACTITIONER
Payer: MEDICARE

## 2025-02-26 DIAGNOSIS — F17.210 CIGARETTE NICOTINE DEPENDENCE WITHOUT COMPLICATION: ICD-10-CM

## 2025-02-26 DIAGNOSIS — J44.9 COPD (CHRONIC OBSTRUCTIVE PULMONARY DISEASE): ICD-10-CM

## 2025-02-26 DIAGNOSIS — F17.200 NICOTINE DEPENDENCE: Primary | ICD-10-CM

## 2025-02-26 LAB
DLCO SINGLE BREATH LLN: 20.4
DLCO SINGLE BREATH PRE REF: 45.3 %
DLCO SINGLE BREATH REF: 27.33
DLCOC SBVA LLN: 2.75
DLCOC SBVA REF: 3.95
DLCOC SINGLE BREATH LLN: 20.4
DLCOC SINGLE BREATH REF: 27.33
DLCOVA LLN: 2.75
DLCOVA PRE REF: 68.9 %
DLCOVA PRE: 2.72 ML/(MIN*MMHG*L) (ref 2.75–5.15)
DLCOVA REF: 3.95
ERVN2 LLN: -16448.9
ERVN2 PRE REF: 68 %
ERVN2 PRE: 0.75 L (ref -16448.9–16451.1)
ERVN2 REF: 1.1
FEF 25 75 CHG: 6.1 %
FEF 25 75 LLN: 1.59
FEF 25 75 POST REF: 34.9 %
FEF 25 75 PRE REF: 32.9 %
FEF 25 75 REF: 3.31
FET100 CHG: -15 %
FEV1 CHG: -0.2 %
FEV1 FVC CHG: 1.6 %
FEV1 FVC LLN: 64
FEV1 FVC POST REF: 89.9 %
FEV1 FVC PRE REF: 88.5 %
FEV1 FVC REF: 77
FEV1 LLN: 2.43
FEV1 POST REF: 56.5 %
FEV1 PRE REF: 56.6 %
FEV1 REF: 3.29
FRCN2 LLN: 2.61
FRCN2 PRE REF: 92.2 %
FRCN2 REF: 3.6
FVC CHG: -1.8 %
FVC LLN: 3.23
FVC POST REF: 62.7 %
FVC PRE REF: 63.8 %
FVC REF: 4.29
IVC PRE: 2.56 L (ref 3.23–5.36)
IVC SINGLE BREATH LLN: 3.23
IVC SINGLE BREATH PRE REF: 59.6 %
IVC SINGLE BREATH REF: 4.29
PEF CHG: -5.9 %
PEF LLN: 6.35
PEF POST REF: 48.6 %
PEF PRE REF: 51.6 %
PEF REF: 8.6
POST FEF 25 75: 1.15 L/S (ref 1.59–5.02)
POST FET 100: 4.8 SEC
POST FEV1 FVC: 69.1 % (ref 64.14–88.04)
POST FEV1: 1.86 L (ref 2.43–4.1)
POST FVC: 2.69 L (ref 3.23–5.36)
POST PEF: 4.18 L/S (ref 6.35–10.85)
PRE DLCO: 12.37 ML/(MIN*MMHG) (ref 20.4–34.25)
PRE FEF 25 75: 1.09 L/S (ref 1.59–5.02)
PRE FET 100: 5.64 SEC
PRE FEV1 FVC: 68.01 % (ref 64.14–88.04)
PRE FEV1: 1.86 L (ref 2.43–4.1)
PRE FRC N2: 3.31 L (ref 2.61–4.58)
PRE FVC: 2.73 L (ref 3.23–5.36)
PRE PEF: 4.44 L/S (ref 6.35–10.85)
RVN2 LLN: 1.82
RVN2 PRE REF: 102.8 %
RVN2 PRE: 2.57 L (ref 1.82–3.17)
RVN2 REF: 2.5
RVN2TLCN2 LLN: 30.33
RVN2TLCN2 PRE REF: 123.1 %
RVN2TLCN2 PRE: 48.41 % (ref 30.33–48.29)
RVN2TLCN2 REF: 39.31
TLCN2 LLN: 5.77
TLCN2 PRE REF: 76.6 %
TLCN2 PRE: 5.3 L (ref 5.77–8.07)
TLCN2 REF: 6.92
VA PRE: 4.55 L (ref 6.77–6.77)
VA SINGLE BREATH LLN: 6.77
VA SINGLE BREATH PRE REF: 67.1 %
VA SINGLE BREATH REF: 6.77
VCMAXN2 LLN: 3.23
VCMAXN2 PRE REF: 63.8 %
VCMAXN2 PRE: 2.73 L (ref 3.23–5.36)
VCMAXN2 REF: 4.29

## 2025-02-26 PROCEDURE — 94729 DIFFUSING CAPACITY: CPT | Performed by: CLINIC/CENTER

## 2025-02-26 PROCEDURE — 94727 GAS DIL/WSHOT DETER LNG VOL: CPT | Performed by: CLINIC/CENTER

## 2025-02-26 PROCEDURE — 94060 EVALUATION OF WHEEZING: CPT | Performed by: CLINIC/CENTER

## 2025-04-22 ENCOUNTER — TELEPHONE (OUTPATIENT)
Dept: PULMONOLOGY | Facility: CLINIC | Age: 65
End: 2025-04-22
Payer: MEDICARE

## 2025-04-22 NOTE — TELEPHONE ENCOUNTER
Returned pt call about needing to reschedule appt due to provider working at hospital. New pt appt rescheduled 06/03/25 @ 1100, pt v/u.

## 2025-04-22 NOTE — TELEPHONE ENCOUNTER
----- Message from Iza sent at 4/22/2025  9:13 AM CDT -----  Regarding: return call  Contact: patient  Type:  Patient Returning CallWho Called:  patientWho Left Message for Patient:  Facundo the patient know what this is regarding?:  appointmentBe Call Back Number:  [phone] Additional Information:  Please call patient to advise.  Thanks!

## 2025-04-25 ENCOUNTER — TELEPHONE (OUTPATIENT)
Dept: PULMONOLOGY | Facility: CLINIC | Age: 65
End: 2025-04-25
Payer: MEDICARE

## 2025-04-25 ENCOUNTER — OFFICE VISIT (OUTPATIENT)
Dept: PULMONOLOGY | Facility: CLINIC | Age: 65
End: 2025-04-25
Payer: MEDICARE

## 2025-04-25 VITALS — HEART RATE: 77 BPM | OXYGEN SATURATION: 95 % | DIASTOLIC BLOOD PRESSURE: 64 MMHG | SYSTOLIC BLOOD PRESSURE: 121 MMHG

## 2025-04-25 DIAGNOSIS — J44.9 CHRONIC OBSTRUCTIVE PULMONARY DISEASE, UNSPECIFIED COPD TYPE: Primary | ICD-10-CM

## 2025-04-25 DIAGNOSIS — R91.1 LUNG NODULE: ICD-10-CM

## 2025-04-25 PROCEDURE — 1159F MED LIST DOCD IN RCRD: CPT | Mod: CPTII,S$GLB,, | Performed by: NURSE PRACTITIONER

## 2025-04-25 PROCEDURE — 3078F DIAST BP <80 MM HG: CPT | Mod: CPTII,S$GLB,, | Performed by: NURSE PRACTITIONER

## 2025-04-25 PROCEDURE — 1101F PT FALLS ASSESS-DOCD LE1/YR: CPT | Mod: CPTII,S$GLB,, | Performed by: NURSE PRACTITIONER

## 2025-04-25 PROCEDURE — 3074F SYST BP LT 130 MM HG: CPT | Mod: CPTII,S$GLB,, | Performed by: NURSE PRACTITIONER

## 2025-04-25 PROCEDURE — 4010F ACE/ARB THERAPY RXD/TAKEN: CPT | Mod: CPTII,S$GLB,, | Performed by: NURSE PRACTITIONER

## 2025-04-25 PROCEDURE — 3288F FALL RISK ASSESSMENT DOCD: CPT | Mod: CPTII,S$GLB,, | Performed by: NURSE PRACTITIONER

## 2025-04-25 PROCEDURE — 99999 PR PBB SHADOW E&M-EST. PATIENT-LVL III: CPT | Mod: PBBFAC,,, | Performed by: NURSE PRACTITIONER

## 2025-04-25 PROCEDURE — 99205 OFFICE O/P NEW HI 60 MIN: CPT | Mod: S$GLB,,, | Performed by: NURSE PRACTITIONER

## 2025-04-25 RX ORDER — ATORVASTATIN CALCIUM 40 MG/1
40 TABLET, FILM COATED ORAL
COMMUNITY

## 2025-04-25 RX ORDER — ALBUTEROL SULFATE 90 UG/1
2 INHALANT RESPIRATORY (INHALATION)
COMMUNITY

## 2025-04-25 RX ORDER — ALBUTEROL SULFATE 0.83 MG/ML
2.5 SOLUTION RESPIRATORY (INHALATION) EVERY 6 HOURS PRN
Qty: 240 ML | Refills: 11 | Status: SHIPPED | OUTPATIENT
Start: 2025-04-25 | End: 2026-04-25

## 2025-04-25 RX ORDER — TIOTROPIUM BROMIDE AND OLODATEROL 3.124; 2.736 UG/1; UG/1
SPRAY, METERED RESPIRATORY (INHALATION)
COMMUNITY
Start: 2025-04-24

## 2025-04-25 RX ORDER — BUDESONIDE, GLYCOPYRROLATE, AND FORMOTEROL FUMARATE 160; 9; 4.8 UG/1; UG/1; UG/1
2 AEROSOL, METERED RESPIRATORY (INHALATION) 2 TIMES DAILY
Qty: 32.1 G | Refills: 3 | Status: SHIPPED | OUTPATIENT
Start: 2025-04-25

## 2025-04-25 RX ORDER — LISINOPRIL 20 MG/1
20 TABLET ORAL
COMMUNITY

## 2025-04-25 NOTE — TELEPHONE ENCOUNTER
Pt is scheduled.     ----- Message from Anitra Mejia NP sent at 4/24/2025  5:00 PM CDT -----  Please put this patient on my schedule tomorrow at 11. Just double book me.

## 2025-04-25 NOTE — PROGRESS NOTES
25    Vlad Gonzales  New Patient Consult    Chief Complaint   Patient presents with    Lung Mass       History of Present Illness    2025  Established patient NP Janet at [facility] in [place]. Changed insurance has to change hospital system. Currently working with [employer], travels for work for extended periods of time.     Patient reports a cough present for at least 1 year. The cough occurs daily and is productive, with expectoration of about a quarter-size amount of clear to white, thick mucus. He has chest tightness, shortness of breath, and wheezing associated with the cough. He has also had unintentional weight loss of approximately 25 lbs over the past year. He reports chronic back pain and shoulder pain related to cartilage issues. He indicates significant difficulty walking to the appointment due to his symptoms. His symptoms appear to be significantly impacting his daily activities and quality of life.    He denies night sweats and previous episodes of pneumonia.    MEDICAL HISTORY:  Patient has a history of a heart condition that began in either  or . He also has a history of COPD.    FAMILY HISTORY:  Family history is significant for sister who had lung cancer, bone cancer, and brain cancer, and is now . His brother, who is also , had lung cancer and experienced a heart attack and coronavirus while incarcerated.    SURGICAL HISTORY:  Patient underwent heart surgery in either  or , though the specific details of the procedure were not provided.    IMAGING:  Patient brought a disc containing previous imaging results.    SOCIAL HISTORY:  Smoking: Currently smokes less than a pack a day, started at age 15, has been smoking for about 50 years Occupation: Retired, formerly worked for [employer] for [employer] workers        Lives with roommate and pet dog, currently working in [employer] that travels from [place], [place], and [place].   Currently  smoking 1 pack daily, 50 pack years.  No history of previous pneumonia.         The chief compliant  problem is new to me  PFSH:  Past Medical History:   Diagnosis Date    Coronary artery disease     cardiac stents    Hypertension          No past surgical history on file.  Social History[1]  No family history on file.  Review of patient's allergies indicates:  No Known Allergies  I have reviewed past medical, family, and social history. I have reviewed previous nurse notes.        Performance Status:The patient's activity level is functions out of house.           Review of Systems:  a review of eleven systems covering constitutional, Eye, HEENT, Psych, Respiratory, Cardiac, GI, , Musculoskeletal, Endocrine, Dermatologic was negative except for pertinent findings as listed ABOVE and below: pertinent positive as above, rest is good  ROS:  General: +fatigue, +weight loss  Respiratory: +cough, +shortness of breath, +productive cough, +chest tightness, +wheezing  Musculoskeletal: +back pain, +pain with movement, +limb pain          Exam:Comprehensive exam done. /64 (BP Location: Left arm, Patient Position: Sitting)   Pulse 77   SpO2 95%   Exam included Vitals as listed  Physical Exam    General: No acute distress. Well-developed. Well-nourished.  Eyes: EOMI. Sclerae anicteric.  HENT: Normocephalic. Atraumatic. Nares patent. Moist oral mucosa.  Ears: Bilateral TMs clear. Bilateral EACs clear.  Cardiovascular: Regular rate. Regular rhythm. No murmurs. No rubs. No gallops. Normal S1, S2.  Respiratory: Normal respiratory effort. Clear to auscultation bilaterally. No rales. Bilateral rhonchi. No wheezing. Barrel shaped chest.  Abdomen: Soft. Non-tender. Non-distended. Normoactive bowel sounds.  Musculoskeletal: No  obvious deformity.  Extremities: No lower extremity edema. Clubbing present on feet bilaterally. Clubbing present on hands bilaterally.  Neurological: Alert & oriented x3. No slurred speech. Normal  gait.  Psychiatric: Normal mood. Normal affect. Good insight. Good judgment.  Skin: Warm. Dry. No rash.  Lymphatics: No axillary lymphadenopathy.  Neck: No clavicle lymphadenopathy. No cervical lymphadenopathy.           Radiographs (ct chest and cxr) reviewed: Patient provided copy of images and external reports CT chest  patient imaging studies reviewed and interpreted independently. My personal interpretation of most resent images include:      CT Chest With Contrast 10/16/24 small right hilar lymph node, Cavitary mass superior segment of the right lower lobe 4.7 x 4.8 cm  Bilateral patchy infiltrates; Paraseptal and centrilobular emphysema      Labs: Patients labs reviewed including CBC and C02  reviewed       Lab Results   Component Value Date    WBC 6.81 02/26/2025    RBC 4.34 (L) 02/26/2025    HGB 12.1 (L) 02/26/2025    HCT 38.6 (L) 02/26/2025    MCV 89 02/26/2025    MCH 27.9 02/26/2025    MCHC 31.3 (L) 02/26/2025    RDW 12.1 02/26/2025     02/26/2025    MPV 8.9 (L) 02/26/2025    GRAN 4.3 02/26/2025    GRAN 63.8 02/26/2025    LYMPH 1.4 02/26/2025    LYMPH 20.0 02/26/2025    MONO 0.8 02/26/2025    MONO 11.3 02/26/2025    EOS 0.3 02/26/2025    BASO 0.07 02/26/2025    EOSINOPHIL 3.8 02/26/2025    BASOPHIL 1.0 02/26/2025     C02   Latest Reference Range & Units 03/20/20 06:03 02/26/25 10:03   CO2 23 - 29 mmol/L 24 29         PFT reviewed  Pulmonary Functions Testing Results:  2/26/25   FEV1/FVC 68 FEV1 1.86L or 56%; Moderate COPD  TLC 76% with diffusion rate 45%        Plan:  Clinical impression is ambiguous and will need repeated evaluation wrt nature of enlarging lung mass, diagnosis will require tissue sampling. Unclear on which approach due to severity of emphysema.    Vlad was seen today for lung mass.    Diagnoses and all orders for this visit:    Chronic obstructive pulmonary disease, unspecified COPD type  -     budesonide-glycopyr-formoterol (BREZTRI AEROSPHERE) 160-9-4.8 mcg/actuation HFAA;  Inhale 2 puffs into the lungs 2 (two) times a day.  -     Complete PFT with bronchodilator; Future  -     NEBULIZER FOR HOME USE  -     albuterol (PROVENTIL) 2.5 mg /3 mL (0.083 %) nebulizer solution; Take 3 mLs (2.5 mg total) by nebulization every 6 (six) hours as needed for Wheezing or Shortness of Breath. Rescue    Lung nodule  Comments:  - Nodify Lung labwork  Orders:  -     NM PET CT FDG Skull Base to Mid Thigh; Future          Assessment & Plan    PLAN SUMMARY:  - Order PET scan to evaluate suspected cancer  - Order specialized blood test to detect cancer antibodies (home-based)  - Start new inhaler (pending financial assistance program)  - Order nebulizer machine  - Start nebulizer treatments twice daily, 10 minutes each  - Consider lymph node biopsy as safer option than nodule biopsy  - Financial assistance program to contact patient regarding inhaler medication  - Virtual follow-up after today's in-person visit        Follow up in about 4 weeks (around 5/23/2025), or if symptoms worsen or fail to improve, for Virtual Visit.      Discussed with patient above for education the following:      There are no Patient Instructions on file for this visit.    This note was generated with the assistance of ambient listening technology. Verbal consent was obtained by the patient and accompanying visitor(s) for the recording of patient appointment to facilitate this note. I attest to having reviewed and edited the generated note for accuracy, though some syntax or spelling errors may persist. Please contact the author of this note for any clarification.    I spent a total of 60 minutes on the day of the visit.  This includes face to face time and non-face to face time preparing to see the patient (eg, review of tests), obtaining and/or reviewing separately obtained history, documenting clinical information in the electronic or other health record, independently interpreting results and communicating results to the  patient/family/caregiver, or care coordinator.          [1]   Social History  Tobacco Use    Smoking status: Every Day     Current packs/day: 2.00     Types: Cigarettes   Substance Use Topics    Alcohol use: Yes     Comment: occas

## 2025-04-29 ENCOUNTER — TELEPHONE (OUTPATIENT)
Dept: PULMONOLOGY | Facility: CLINIC | Age: 65
End: 2025-04-29
Payer: MEDICARE

## 2025-05-02 ENCOUNTER — HOSPITAL ENCOUNTER (OUTPATIENT)
Dept: PULMONOLOGY | Facility: HOSPITAL | Age: 65
Discharge: HOME | End: 2025-05-02
Attending: NURSE PRACTITIONER
Payer: MEDICARE

## 2025-05-02 ENCOUNTER — RESULTS FOLLOW-UP (OUTPATIENT)
Dept: PULMONOLOGY | Facility: CLINIC | Age: 65
End: 2025-05-02

## 2025-05-02 DIAGNOSIS — J44.9 CHRONIC OBSTRUCTIVE PULMONARY DISEASE, UNSPECIFIED COPD TYPE: ICD-10-CM

## 2025-05-02 LAB
DLCO SINGLE BREATH LLN: 20.4
DLCO SINGLE BREATH PRE REF: 46.7 %
DLCO SINGLE BREATH REF: 27.33
DLCOC SBVA LLN: 2.75
DLCOC SBVA REF: 3.95
DLCOC SINGLE BREATH LLN: 20.4
DLCOC SINGLE BREATH REF: 27.33
DLCOVA LLN: 2.75
DLCOVA PRE REF: 75.5 %
DLCOVA PRE: 2.98 ML/(MIN*MMHG*L) (ref 2.75–5.15)
DLCOVA REF: 3.95
ERV LLN: -16448.9
ERV PRE REF: 113.5 %
ERV REF: 1.1
FEF 25 75 CHG: -32.8 %
FEF 25 75 LLN: 1.59
FEF 25 75 POST REF: 18.8 %
FEF 25 75 PRE REF: 28 %
FEF 25 75 REF: 3.31
FET100 CHG: 41.5 %
FEV1 CHG: 2.4 %
FEV1 FVC CHG: -8.2 %
FEV1 FVC LLN: 64
FEV1 FVC POST REF: 71.1 %
FEV1 FVC PRE REF: 77.5 %
FEV1 FVC REF: 77
FEV1 LLN: 2.42
FEV1 POST REF: 52.3 %
FEV1 PRE REF: 51.1 %
FEV1 REF: 3.28
FRCPLETH LLN: 2.61
FRCPLETH PREREF: 120.4 %
FRCPLETH REF: 3.6
FVC CHG: 11.6 %
FVC LLN: 3.22
FVC POST REF: 73.4 %
FVC PRE REF: 65.8 %
FVC REF: 4.28
IVC PRE: 2.93 L (ref 3.22–5.36)
IVC SINGLE BREATH LLN: 3.22
IVC SINGLE BREATH PRE REF: 68.6 %
IVC SINGLE BREATH REF: 4.28
MVV LLN: 107
MVV PRE REF: 40.9 %
MVV REF: 126
PEF CHG: 13.3 %
PEF LLN: 6.35
PEF POST REF: 28.4 %
PEF PRE REF: 25.1 %
PEF REF: 8.6
POST FEF 25 75: 0.62 L/S (ref 1.59–5.02)
POST FET 100: 9.08 SEC
POST FEV1 FVC: 54.64 % (ref 64.09–88.04)
POST FEV1: 1.72 L (ref 2.42–4.09)
POST FVC: 3.14 L (ref 3.22–5.36)
POST PEF: 2.44 L/S (ref 6.35–10.85)
PRE DLCO: 12.76 ML/(MIN*MMHG) (ref 20.4–34.25)
PRE ERV: 1.25 L (ref -16448.9–16451.1)
PRE FEF 25 75: 0.93 L/S (ref 1.59–5.02)
PRE FET 100: 6.41 SEC
PRE FEV1 FVC: 59.53 % (ref 64.09–88.04)
PRE FEV1: 1.68 L (ref 2.42–4.09)
PRE FRC PL: 4.33 L (ref 2.61–4.58)
PRE FVC: 2.81 L (ref 3.22–5.36)
PRE MVV: 51.75 L/MIN (ref 107.48–145.41)
PRE PEF: 2.16 L/S (ref 6.35–10.85)
PRE RV: 3.08 L (ref 1.82–3.17)
PRE TLC: 5.9 L (ref 5.77–8.07)
RAW LLN: 3.06
RAW PRE REF: 116.8 %
RAW PRE: 3.57 CMH2O*S/L (ref 3.06–3.06)
RAW REF: 3.06
RV LLN: 1.82
RV PRE REF: 123.4 %
RV REF: 2.5
RVTLC LLN: 30
RVTLC PRE REF: 132.9 %
RVTLC PRE: 52.24 % (ref 30.33–48.29)
RVTLC REF: 39
TLC LLN: 5.77
TLC PRE REF: 85.2 %
TLC REF: 6.92
VA PRE: 4.28 L (ref 6.77–6.77)
VA SINGLE BREATH LLN: 6.77
VA SINGLE BREATH PRE REF: 63.2 %
VA SINGLE BREATH REF: 6.77
VC LLN: 3.22
VC PRE REF: 65.8 %
VC PRE: 2.82 L (ref 3.22–5.36)
VC REF: 4.28

## 2025-05-02 PROCEDURE — 94729 DIFFUSING CAPACITY: CPT

## 2025-05-02 PROCEDURE — 94726 PLETHYSMOGRAPHY LUNG VOLUMES: CPT

## 2025-05-02 PROCEDURE — 94060 EVALUATION OF WHEEZING: CPT

## 2025-05-02 RX ORDER — ALBUTEROL SULFATE 2.5 MG/.5ML
SOLUTION RESPIRATORY (INHALATION)
Status: DISPENSED
Start: 2025-05-02 | End: 2025-05-02

## 2025-05-05 PROCEDURE — 94726 PLETHYSMOGRAPHY LUNG VOLUMES: CPT | Mod: 26,,, | Performed by: INTERNAL MEDICINE

## 2025-05-05 PROCEDURE — 94060 EVALUATION OF WHEEZING: CPT | Mod: 26,,, | Performed by: INTERNAL MEDICINE

## 2025-05-05 PROCEDURE — 94729 DIFFUSING CAPACITY: CPT | Mod: 26,,, | Performed by: INTERNAL MEDICINE

## 2025-05-20 ENCOUNTER — TELEPHONE (OUTPATIENT)
Dept: PULMONOLOGY | Facility: CLINIC | Age: 65
End: 2025-05-20
Payer: MEDICARE

## 2025-05-20 NOTE — TELEPHONE ENCOUNTER
Reached out to pt to confirm virtual appt 05/21/25. Pt was confused and explained he thought clinic would call him, pt is unable to get on PP, and is unable to setup transportation. Provider agreed to call pt for appt, pt v/u. Reached out to PET dept for status on PET request sent in 04/2025, lmom with pt info, name and call back number.

## 2025-05-20 NOTE — TELEPHONE ENCOUNTER
----- Message from Anitra Mejia NP sent at 5/20/2025  4:34 PM CDT -----  Patient is scheduled for 5/21/25. Nodify came back with indeterminate results. This is worrisome. Please confirm with patient he will attend his appointment. I ordered a PET scan 4 weeks prior. I do not see that it is scheduled at the moment. I would like to see this done as soon as possible.

## 2025-05-21 ENCOUNTER — OFFICE VISIT (OUTPATIENT)
Dept: PULMONOLOGY | Facility: CLINIC | Age: 65
End: 2025-05-21
Payer: MEDICARE

## 2025-05-21 ENCOUNTER — TELEPHONE (OUTPATIENT)
Dept: PULMONOLOGY | Facility: CLINIC | Age: 65
End: 2025-05-21

## 2025-05-21 DIAGNOSIS — R91.8 LUNG MASS: Primary | ICD-10-CM

## 2025-05-21 PROCEDURE — 1159F MED LIST DOCD IN RCRD: CPT | Mod: CPTII,95,, | Performed by: NURSE PRACTITIONER

## 2025-05-21 PROCEDURE — 1160F RVW MEDS BY RX/DR IN RCRD: CPT | Mod: CPTII,95,, | Performed by: NURSE PRACTITIONER

## 2025-05-21 PROCEDURE — 98004 SYNCH AUDIO-VIDEO EST SF 10: CPT | Mod: 95,,, | Performed by: NURSE PRACTITIONER

## 2025-05-21 PROCEDURE — 4010F ACE/ARB THERAPY RXD/TAKEN: CPT | Mod: CPTII,95,, | Performed by: NURSE PRACTITIONER

## 2025-05-21 NOTE — TELEPHONE ENCOUNTER
----- Message from Puja sent at 5/21/2025  4:45 PM CDT -----  Regarding: Returning call  Contact: Patient  Type:  Patient Returning CallWho Called:Patient Who Left Message for Patient:Dr Mejia Does the patient know what this is regarding?:yes Would the patient rather a call back or a response via [facility]? Call [facility] Call Back Number: [phone] Information: Pt states he was speaking with provider Anitra Mejia, but line was disconnected. Please call back to advise. Thanks!  ----- Message -----  From: Puja Pillai  Sent: 5/21/2025   4:47 PM CDT  To: Roberto Ayoub Staff  Subject: Returning call                                   Type:  Patient Returning CallWho Called:Patient Who Left Message for Patient:Dr Mejia Does the patient know what this is regarding?:yes Would the patient rather a call back or a response via [facility]? [facility] Call Back Number: [phone] Information: Pt states he was speaking with Dr. Mejia, but line was disconnected. Please call back to advise. Thanks!

## 2025-05-21 NOTE — PROGRESS NOTES
5/21/2025  The patient location is: [place], [place]  The chief complaint leading to consultation is: lung mass    Visit type: audio only    Face to Face time with patient: 8 minutes  10 minutes of total time spent on the encounter, which includes face to face time and non-face to face time preparing to see the patient (eg, review of tests), Obtaining and/or reviewing separately obtained history, Documenting clinical information in the electronic or other health record, Independently interpreting results (not separately reported) and communicating results to the patient/family/caregiver, or Care coordination (not separately reported).         Each patient to whom he or she provides medical services by telemedicine is:  (1) informed of the relationship between the physician and patient and the respective role of any other health care provider with respect to management of the patient; and (2) notified that he or she may decline to receive medical services by telemedicine and may withdraw from such care at any time.    Notes:     HPI: spoke to patient on phone. Patient is unable to use [employer] [facility] for virtual visit, he is currently staying alone in his home. All of his family and friends work for traveling [employer]. He does not currently own a vehicle and has no close friends to provide him transportation. They will return by next week. States he had the Nodify blood test that shows indeterminate results. States he has not heard from hospital to schedule PET scan that was ordered last month.     Plan:   Nurse contacted scheduling, reports patient will be contacted today to schedule PET Scan    Chest X-ray in one week    Problem List Items Addressed This Visit    None  Visit Diagnoses         Lung mass    -  Primary    Relevant Orders    X-Ray Chest PA And Lateral

## 2025-05-21 NOTE — TELEPHONE ENCOUNTER
Returned pt call spoke about connection lost. Pt explains he's on the phone with scheduling for his PET, 06/03/25 at [facility] imaging center. Pt did not need anything further.

## 2025-05-26 ENCOUNTER — NURSE TRIAGE (OUTPATIENT)
Dept: ADMINISTRATIVE | Facility: CLINIC | Age: 65
End: 2025-05-26
Payer: MEDICARE

## 2025-05-26 NOTE — TELEPHONE ENCOUNTER
Last had BM over a week ago. Triage done- see provider in 24 hours. Unable to schedule with his provider. He wants to try constipation plan of care.  Reason for Disposition   Last bowel movement (BM) > 4 days ago   MILD constipation    Additional Information   Negative: [1] Vomiting AND [2] contains bile (green color)   Negative: Patient sounds very sick or weak to the triager   Negative: [1] Vomiting AND [2] abdomen looks much more swollen than usual   Negative: [1] Constant abdominal pain AND [2] present > 2 hours   Negative: [1] Rectal pain or fullness from fecal impaction (rectum full of stool) AND [2] NOT better after SITZ bath, suppository or enema   Negative: [1] MILD abdominal pain (e.g., does not interfere with normal activities) AND [2] pain comes and goes (cramps) AND [3] fever    Protocols used: Constipation-A-AH

## 2025-05-28 ENCOUNTER — TELEPHONE (OUTPATIENT)
Dept: PULMONOLOGY | Facility: CLINIC | Age: 65
End: 2025-05-28
Payer: MEDICARE

## 2025-05-28 ENCOUNTER — HOSPITAL ENCOUNTER (OUTPATIENT)
Dept: RADIOLOGY | Facility: HOSPITAL | Age: 65
Discharge: HOME | End: 2025-05-28
Attending: NURSE PRACTITIONER
Payer: MEDICARE

## 2025-05-28 DIAGNOSIS — R91.8 LUNG MASS: ICD-10-CM

## 2025-05-28 PROCEDURE — 71046 X-RAY EXAM CHEST 2 VIEWS: CPT | Mod: TC,PO

## 2025-05-28 PROCEDURE — 71046 X-RAY EXAM CHEST 2 VIEWS: CPT | Mod: 26,,, | Performed by: RADIOLOGY

## 2025-05-28 NOTE — TELEPHONE ENCOUNTER
Spoke to patient with questions he had regarding pet scan he has next week. Also he wanted to let us know that he got the xray done     ----- Message from Sami sent at 5/28/2025  1:09 PM CDT -----  Type:  Needs Medical AdviceWho Called: ptWould the patient rather a call back or a response via [facility]? Call [facility] Call Back Number:[phone] Additional Information: pt has questions about questionnaire giving to him at [facility]. Pt st he doesn't know what he done. Theres questions he dont know the answer to and would like office to give him a call.  please call to discuss

## 2025-05-30 ENCOUNTER — RESULTS FOLLOW-UP (OUTPATIENT)
Dept: PULMONOLOGY | Facility: CLINIC | Age: 65
End: 2025-05-30

## 2025-06-03 ENCOUNTER — TELEPHONE (OUTPATIENT)
Dept: PULMONOLOGY | Facility: CLINIC | Age: 65
End: 2025-06-03
Payer: MEDICARE

## 2025-06-03 ENCOUNTER — HOSPITAL ENCOUNTER (OUTPATIENT)
Dept: RADIOLOGY | Facility: HOSPITAL | Age: 65
Discharge: HOME | End: 2025-06-03
Attending: NURSE PRACTITIONER
Payer: MEDICARE

## 2025-06-03 VITALS — WEIGHT: 120 LBS | BODY MASS INDEX: 17.77 KG/M2 | HEIGHT: 69 IN

## 2025-06-03 DIAGNOSIS — R91.1 LUNG NODULE: ICD-10-CM

## 2025-06-03 LAB — POCT GLUCOSE: 102 MG/DL (ref 70–110)

## 2025-06-03 PROCEDURE — 78815 PET IMAGE W/CT SKULL-THIGH: CPT | Mod: TC,PO

## 2025-06-03 PROCEDURE — 78815 PET IMAGE W/CT SKULL-THIGH: CPT | Mod: 26,PI,, | Performed by: RADIOLOGY

## 2025-06-03 PROCEDURE — A9552 F18 FDG: HCPCS | Mod: PO | Performed by: NURSE PRACTITIONER

## 2025-06-03 RX ORDER — FLUDEOXYGLUCOSE F18 500 MCI/ML
13.5 INJECTION INTRAVENOUS
Status: COMPLETED | OUTPATIENT
Start: 2025-06-03 | End: 2025-06-03

## 2025-06-03 RX ADMIN — FLUDEOXYGLUCOSE F-18 13.5 MILLICURIE: 500 INJECTION INTRAVENOUS at 12:06

## 2025-06-05 ENCOUNTER — TELEPHONE (OUTPATIENT)
Dept: PULMONOLOGY | Facility: CLINIC | Age: 65
End: 2025-06-05
Payer: MEDICARE

## 2025-06-06 ENCOUNTER — TELEPHONE (OUTPATIENT)
Dept: PULMONOLOGY | Facility: CLINIC | Age: 65
End: 2025-06-06
Payer: MEDICARE

## 2025-06-12 ENCOUNTER — OFFICE VISIT (OUTPATIENT)
Dept: PULMONOLOGY | Facility: CLINIC | Age: 65
End: 2025-06-12
Payer: MEDICARE

## 2025-06-12 VITALS
BODY MASS INDEX: 17.76 KG/M2 | SYSTOLIC BLOOD PRESSURE: 106 MMHG | HEIGHT: 69 IN | DIASTOLIC BLOOD PRESSURE: 68 MMHG | HEART RATE: 89 BPM | WEIGHT: 119.94 LBS | OXYGEN SATURATION: 96 %

## 2025-06-12 DIAGNOSIS — R91.8 LUNG MASS: Primary | ICD-10-CM

## 2025-06-12 PROCEDURE — 1159F MED LIST DOCD IN RCRD: CPT | Mod: CPTII,S$GLB,, | Performed by: STUDENT IN AN ORGANIZED HEALTH CARE EDUCATION/TRAINING PROGRAM

## 2025-06-12 PROCEDURE — 4010F ACE/ARB THERAPY RXD/TAKEN: CPT | Mod: CPTII,S$GLB,, | Performed by: STUDENT IN AN ORGANIZED HEALTH CARE EDUCATION/TRAINING PROGRAM

## 2025-06-12 PROCEDURE — 3078F DIAST BP <80 MM HG: CPT | Mod: CPTII,S$GLB,, | Performed by: STUDENT IN AN ORGANIZED HEALTH CARE EDUCATION/TRAINING PROGRAM

## 2025-06-12 PROCEDURE — 3008F BODY MASS INDEX DOCD: CPT | Mod: CPTII,S$GLB,, | Performed by: STUDENT IN AN ORGANIZED HEALTH CARE EDUCATION/TRAINING PROGRAM

## 2025-06-12 PROCEDURE — 1126F AMNT PAIN NOTED NONE PRSNT: CPT | Mod: CPTII,S$GLB,, | Performed by: STUDENT IN AN ORGANIZED HEALTH CARE EDUCATION/TRAINING PROGRAM

## 2025-06-12 PROCEDURE — 99215 OFFICE O/P EST HI 40 MIN: CPT | Mod: S$GLB,,, | Performed by: STUDENT IN AN ORGANIZED HEALTH CARE EDUCATION/TRAINING PROGRAM

## 2025-06-12 PROCEDURE — 1101F PT FALLS ASSESS-DOCD LE1/YR: CPT | Mod: CPTII,S$GLB,, | Performed by: STUDENT IN AN ORGANIZED HEALTH CARE EDUCATION/TRAINING PROGRAM

## 2025-06-12 PROCEDURE — 3074F SYST BP LT 130 MM HG: CPT | Mod: CPTII,S$GLB,, | Performed by: STUDENT IN AN ORGANIZED HEALTH CARE EDUCATION/TRAINING PROGRAM

## 2025-06-12 PROCEDURE — 3288F FALL RISK ASSESSMENT DOCD: CPT | Mod: CPTII,S$GLB,, | Performed by: STUDENT IN AN ORGANIZED HEALTH CARE EDUCATION/TRAINING PROGRAM

## 2025-06-12 PROCEDURE — 99999 PR PBB SHADOW E&M-EST. PATIENT-LVL III: CPT | Mod: PBBFAC,,, | Performed by: STUDENT IN AN ORGANIZED HEALTH CARE EDUCATION/TRAINING PROGRAM

## 2025-06-12 NOTE — PROGRESS NOTES
6/12/2025    Vlad Gonzales  New Patient History and Physical    Chief Complaint   Patient presents with    Ebus consult       HPI:Mr Gonzales is a 64 year old man who presetns with abnormal CT. Hx of COPD. He was recently discovere dot have a lung mass.     He is curents moker, she smokes about 3 ppd but he cut down to 1.5 pack per day  Started smoking in 15 years old.   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 134 lbs from 155lbs.  No hemotysis. No shaking chills or night sweats    He is on inhalers at home.     The chief complaint problem is New to me    PFSH:  Past Medical History:   Diagnosis Date    Coronary artery disease     cardiac stents    Hypertension          No past surgical history on file.  Social History[1]  No family history on file.  Review of patient's allergies indicates:  No Known Allergies    Performance Status:The patient's activity level is functions out of house.  He cannot walk up 1 flight of stairs very short of breath. He cannot walk more htan 10 yards wihtout stopping.     Review of Systems:   Review of Systems   Constitutional:  Positive for malaise/fatigue. Negative for chills, fever and weight loss.   HENT:  Negative for congestion, sinus pain and sore throat.    Eyes:  Negative for blurred vision and pain.   Respiratory:  Positive for shortness of breath and wheezing. Negative for cough.    Cardiovascular:  Negative for chest pain, palpitations, orthopnea, claudication and leg swelling.   Gastrointestinal:  Negative for abdominal pain, constipation, diarrhea, heartburn, melena, nausea and vomiting.   Genitourinary:  Negative for dysuria, frequency, hematuria and urgency.   Skin:  Negative for itching and rash.   Neurological:  Negative for dizziness, seizures, loss of consciousness and headaches.   Endo/Heme/Allergies:  Negative for environmental allergies and polydipsia.  Does not bruise/bleed easily.   Psychiatric/Behavioral:  Negative for depression. The patient is not nervous/anxious.         Exam:  Physical Exam  Vitals reviewed.   Constitutional:       General: He is not in acute distress.     Appearance: He is well-developed. He is ill-appearing. He is not diaphoretic.      Comments: Thin cachetic appearing      HENT:      Head: Normocephalic and atraumatic.      Mouth/Throat:      Pharynx: No oropharyngeal exudate or posterior oropharyngeal erythema.   Eyes:      General: No scleral icterus.     Pupils: Pupils are equal, round, and reactive to light.   Neck:      Vascular: No JVD.   Cardiovascular:      Rate and Rhythm: Normal rate and regular rhythm.      Heart sounds: Normal heart sounds. No murmur heard.  Pulmonary:      Effort: No respiratory distress.      Breath sounds: Wheezing present.   Abdominal:      General: Bowel sounds are normal. There is no distension.      Palpations: Abdomen is soft.      Tenderness: There is no abdominal tenderness.   Musculoskeletal:         General: No swelling.      Cervical back: Normal range of motion and neck supple. No rigidity.   Skin:     General: Skin is warm and dry.      Capillary Refill: Capillary refill takes less than 2 seconds.      Coloration: Skin is pale.      Findings: No rash.   Neurological:      General: No focal deficit present.      Mental Status: He is alert and oriented to person, place, and time.      Cranial Nerves: No cranial nerve deficit.      Motor: No weakness or abnormal muscle tone.          Radiographs (ct chest and cxr) reviewed: view by direct vision and interpretation as below     Ct reviewed- large necrotic appearing mass in the RLL< extensively FDG active and extends to right hilum, extensive intralobular and pna lobular emphysema.     Labs reviewed from last CBC,CMP,ABG,Micro last 3 entries  on EPIC result review   Lab Results   Component Value Date    WBC 6.81 02/26/2025    HGB 12.1 (L) 02/26/2025     HCT 38.6 (L) 02/26/2025    MCV 89 02/26/2025     02/26/2025       CMP  Sodium   Date Value Ref Range Status   02/26/2025 133 (L) 136 - 145 mmol/L Final     Potassium   Date Value Ref Range Status   02/26/2025 4.1 3.5 - 5.1 mmol/L Final     Chloride   Date Value Ref Range Status   02/26/2025 97 95 - 110 mmol/L Final     CO2   Date Value Ref Range Status   02/26/2025 29 23 - 29 mmol/L Final     Glucose   Date Value Ref Range Status   02/26/2025 97 70 - 110 mg/dL Final     BUN   Date Value Ref Range Status   02/26/2025 13 8 - 23 mg/dL Final     Creatinine   Date Value Ref Range Status   02/26/2025 0.7 0.5 - 1.4 mg/dL Final     Calcium   Date Value Ref Range Status   02/26/2025 9.4 8.7 - 10.5 mg/dL Final     Total Protein   Date Value Ref Range Status   02/26/2025 7.5 6.0 - 8.4 g/dL Final     Albumin   Date Value Ref Range Status   02/26/2025 4.0 3.5 - 5.2 g/dL Final     Total Bilirubin   Date Value Ref Range Status   02/26/2025 0.4 0.1 - 1.0 mg/dL Final     Comment:     For infants and newborns, interpretation of results should be based  on gestational age, weight and in agreement with clinical  observations.    Premature Infant recommended reference ranges:  Up to 24 hours.............<8.0 mg/dL  Up to 48 hours............<12.0 mg/dL  3-5 days..................<15.0 mg/dL  6-29 days.................<15.0 mg/dL       Alkaline Phosphatase   Date Value Ref Range Status   02/26/2025 109 55 - 135 U/L Final     AST   Date Value Ref Range Status   02/26/2025 15 10 - 40 U/L Final     ALT   Date Value Ref Range Status   02/26/2025 8 (L) 10 - 44 U/L Final     Anion Gap   Date Value Ref Range Status   02/26/2025 7 (L) 8 - 16 mmol/L Final     eGFR   Date Value Ref Range Status   02/26/2025 >60.0 >60 mL/min/1.73 m^2 Final         PFT was done and results to be reviewed- severe COPD       Plan:  Clinical impression is apparently straight forward and impression with management as below.      Very concerning necrotic  appaering lung mass. Will plan for EBUS 6/18/25     No blood thinners, he is only on aspirin.     I advised to withhold smoking at least 24 hours prior to procedure.         Problem List Items Addressed This Visit    None      No follow-ups on file.    Discussed with patient above for education the following:      There are no Patient Instructions on file for this visit.         [1]   Social History  Tobacco Use    Smoking status: Every Day     Current packs/day: 2.00     Types: Cigarettes   Substance Use Topics    Alcohol use: Yes     Comment: occas

## 2025-06-12 NOTE — H&P (VIEW-ONLY)
6/12/2025    Vlad Gonzales  New Patient History and Physical    Chief Complaint   Patient presents with    Ebus consult       HPI:Mr Gonzales is a 64 year old man who presetns with abnormal CT. Hx of COPD. He was recently discovere dot have a lung mass.     He is curents moker, she smokes about 3 ppd but he cut down to 1.5 pack per day  Started smoking in 15 years old.   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 134 lbs from 155lbs.  No hemotysis. No shaking chills or night sweats    He is on inhalers at home.     The chief complaint problem is New to me    PFSH:  Past Medical History:   Diagnosis Date    Coronary artery disease     cardiac stents    Hypertension          No past surgical history on file.  Social History[1]  No family history on file.  Review of patient's allergies indicates:  No Known Allergies    Performance Status:The patient's activity level is functions out of house.  He cannot walk up 1 flight of stairs very short of breath. He cannot walk more htan 10 yards wihtout stopping.     Review of Systems:   Review of Systems   Constitutional:  Positive for malaise/fatigue. Negative for chills, fever and weight loss.   HENT:  Negative for congestion, sinus pain and sore throat.    Eyes:  Negative for blurred vision and pain.   Respiratory:  Positive for shortness of breath and wheezing. Negative for cough.    Cardiovascular:  Negative for chest pain, palpitations, orthopnea, claudication and leg swelling.   Gastrointestinal:  Negative for abdominal pain, constipation, diarrhea, heartburn, melena, nausea and vomiting.   Genitourinary:  Negative for dysuria, frequency, hematuria and urgency.   Skin:  Negative for itching and rash.   Neurological:  Negative for dizziness, seizures, loss of consciousness and headaches.   Endo/Heme/Allergies:  Negative for environmental allergies and polydipsia.  Does not bruise/bleed easily.   Psychiatric/Behavioral:  Negative for depression. The patient is not nervous/anxious.         Exam:  Physical Exam  Vitals reviewed.   Constitutional:       General: He is not in acute distress.     Appearance: He is well-developed. He is ill-appearing. He is not diaphoretic.      Comments: Thin cachetic appearing      HENT:      Head: Normocephalic and atraumatic.      Mouth/Throat:      Pharynx: No oropharyngeal exudate or posterior oropharyngeal erythema.   Eyes:      General: No scleral icterus.     Pupils: Pupils are equal, round, and reactive to light.   Neck:      Vascular: No JVD.   Cardiovascular:      Rate and Rhythm: Normal rate and regular rhythm.      Heart sounds: Normal heart sounds. No murmur heard.  Pulmonary:      Effort: No respiratory distress.      Breath sounds: Wheezing present.   Abdominal:      General: Bowel sounds are normal. There is no distension.      Palpations: Abdomen is soft.      Tenderness: There is no abdominal tenderness.   Musculoskeletal:         General: No swelling.      Cervical back: Normal range of motion and neck supple. No rigidity.   Skin:     General: Skin is warm and dry.      Capillary Refill: Capillary refill takes less than 2 seconds.      Coloration: Skin is pale.      Findings: No rash.   Neurological:      General: No focal deficit present.      Mental Status: He is alert and oriented to person, place, and time.      Cranial Nerves: No cranial nerve deficit.      Motor: No weakness or abnormal muscle tone.          Radiographs (ct chest and cxr) reviewed: view by direct vision and interpretation as below     Ct reviewed- large necrotic appearing mass in the RLL< extensively FDG active and extends to right hilum, extensive intralobular and pna lobular emphysema.     Labs reviewed from last CBC,CMP,ABG,Micro last 3 entries  on EPIC result review   Lab Results   Component Value Date    WBC 6.81 02/26/2025    HGB 12.1 (L) 02/26/2025     HCT 38.6 (L) 02/26/2025    MCV 89 02/26/2025     02/26/2025       CMP  Sodium   Date Value Ref Range Status   02/26/2025 133 (L) 136 - 145 mmol/L Final     Potassium   Date Value Ref Range Status   02/26/2025 4.1 3.5 - 5.1 mmol/L Final     Chloride   Date Value Ref Range Status   02/26/2025 97 95 - 110 mmol/L Final     CO2   Date Value Ref Range Status   02/26/2025 29 23 - 29 mmol/L Final     Glucose   Date Value Ref Range Status   02/26/2025 97 70 - 110 mg/dL Final     BUN   Date Value Ref Range Status   02/26/2025 13 8 - 23 mg/dL Final     Creatinine   Date Value Ref Range Status   02/26/2025 0.7 0.5 - 1.4 mg/dL Final     Calcium   Date Value Ref Range Status   02/26/2025 9.4 8.7 - 10.5 mg/dL Final     Total Protein   Date Value Ref Range Status   02/26/2025 7.5 6.0 - 8.4 g/dL Final     Albumin   Date Value Ref Range Status   02/26/2025 4.0 3.5 - 5.2 g/dL Final     Total Bilirubin   Date Value Ref Range Status   02/26/2025 0.4 0.1 - 1.0 mg/dL Final     Comment:     For infants and newborns, interpretation of results should be based  on gestational age, weight and in agreement with clinical  observations.    Premature Infant recommended reference ranges:  Up to 24 hours.............<8.0 mg/dL  Up to 48 hours............<12.0 mg/dL  3-5 days..................<15.0 mg/dL  6-29 days.................<15.0 mg/dL       Alkaline Phosphatase   Date Value Ref Range Status   02/26/2025 109 55 - 135 U/L Final     AST   Date Value Ref Range Status   02/26/2025 15 10 - 40 U/L Final     ALT   Date Value Ref Range Status   02/26/2025 8 (L) 10 - 44 U/L Final     Anion Gap   Date Value Ref Range Status   02/26/2025 7 (L) 8 - 16 mmol/L Final     eGFR   Date Value Ref Range Status   02/26/2025 >60.0 >60 mL/min/1.73 m^2 Final         PFT was done and results to be reviewed- severe COPD       Plan:  Clinical impression is apparently straight forward and impression with management as below.      Very concerning necrotic  appaering lung mass. Will plan for EBUS 6/18/25     No blood thinners, he is only on aspirin.     I advised to withhold smoking at least 24 hours prior to procedure.         Problem List Items Addressed This Visit    None      No follow-ups on file.    Discussed with patient above for education the following:      There are no Patient Instructions on file for this visit.         [1]   Social History  Tobacco Use    Smoking status: Every Day     Current packs/day: 2.00     Types: Cigarettes   Substance Use Topics    Alcohol use: Yes     Comment: occas

## 2025-06-13 ENCOUNTER — TELEPHONE (OUTPATIENT)
Dept: PULMONOLOGY | Facility: CLINIC | Age: 65
End: 2025-06-13
Payer: MEDICARE

## 2025-06-16 ENCOUNTER — HOSPITAL ENCOUNTER (OUTPATIENT)
Dept: PREADMISSION TESTING | Facility: HOSPITAL | Age: 65
Discharge: HOME | End: 2025-06-16
Attending: STUDENT IN AN ORGANIZED HEALTH CARE EDUCATION/TRAINING PROGRAM
Payer: MEDICARE

## 2025-06-16 VITALS
RESPIRATION RATE: 18 BRPM | HEART RATE: 86 BPM | DIASTOLIC BLOOD PRESSURE: 66 MMHG | BODY MASS INDEX: 17.63 KG/M2 | OXYGEN SATURATION: 94 % | SYSTOLIC BLOOD PRESSURE: 104 MMHG | TEMPERATURE: 99 F | HEIGHT: 69 IN | WEIGHT: 119 LBS

## 2025-06-16 DIAGNOSIS — Z01.818 PREOP TESTING: Primary | ICD-10-CM

## 2025-06-16 LAB
ANION GAP (SMH): 8 MMOL/L (ref 8–16)
BUN SERPL-MCNC: 13 MG/DL (ref 8–23)
CALCIUM SERPL-MCNC: 10.4 MG/DL (ref 8.7–10.5)
CHLORIDE SERPL-SCNC: 96 MMOL/L (ref 95–110)
CO2 SERPL-SCNC: 27 MMOL/L (ref 23–29)
CREAT SERPL-MCNC: 0.6 MG/DL (ref 0.5–1.4)
ERYTHROCYTE [DISTWIDTH] IN BLOOD BY AUTOMATED COUNT: 12.4 % (ref 11.5–14.5)
GFR SERPLBLD CREATININE-BSD FMLA CKD-EPI: >60 ML/MIN/1.73/M2
GLUCOSE SERPL-MCNC: 100 MG/DL (ref 70–110)
HCT VFR BLD AUTO: 35 % (ref 40–54)
HGB BLD-MCNC: 11.4 GM/DL (ref 14–18)
MCH RBC QN AUTO: 27.7 PG (ref 27–31)
MCHC RBC AUTO-ENTMCNC: 32.6 G/DL (ref 32–36)
MCV RBC AUTO: 85 FL (ref 82–98)
PLATELET # BLD AUTO: 792 K/UL (ref 150–450)
PMV BLD AUTO: 8.2 FL (ref 9.2–12.9)
POTASSIUM SERPL-SCNC: 4 MMOL/L (ref 3.5–5.1)
RBC # BLD AUTO: 4.11 M/UL (ref 4.6–6.2)
SODIUM SERPL-SCNC: 131 MMOL/L (ref 136–145)
WBC # BLD AUTO: 11.68 K/UL (ref 3.9–12.7)

## 2025-06-16 PROCEDURE — 36415 COLL VENOUS BLD VENIPUNCTURE: CPT | Performed by: ANESTHESIOLOGY

## 2025-06-16 PROCEDURE — 85027 COMPLETE CBC AUTOMATED: CPT | Performed by: ANESTHESIOLOGY

## 2025-06-16 PROCEDURE — 93005 ELECTROCARDIOGRAM TRACING: CPT | Performed by: GENERAL PRACTICE

## 2025-06-16 PROCEDURE — 93010 ELECTROCARDIOGRAM REPORT: CPT | Mod: ,,, | Performed by: GENERAL PRACTICE

## 2025-06-16 PROCEDURE — 82310 ASSAY OF CALCIUM: CPT | Performed by: ANESTHESIOLOGY

## 2025-06-16 NOTE — DISCHARGE INSTRUCTIONS
INSTRUCTIONS  To confirm your doctor has scheduled your surgery for: 6/18/25    Morning of surgery please check in with registration near [facility]age Entrance then proceed to Registration then to endoscopy department    ENDOSCOPY NURSES will call the afternoon prior to procedure with your final arrival time.    TAKE ONLY THESE MEDICATIONS WITH A SMALL SIP OF WATER THE MORNING OF SURGERY:    DO NOT TAKE ANY INSULIN OR ORAL DIABETIC MEDICATIONS THE MORNING OF SURGERY UNLESS DIRECTED BY YOUR PHYSICIAN OR PRE ADMIT NURSE.    DO NOT TAKE THESE MEDICATIONS 5-7 DAYS PRIOR to your procedure per your surgeon's request: ASPIRIN, ALEVE, BC powder, BREANNA SELTZER, IBUPROFEN, FISH OIL, VITAMIN E, OR HERBALS   (May take Tylenol)    If you are prescribed any types of blood thinners (Aspirin, Coumadin, Plavix, Pradaxa, Xarelto, Aggrenox, Effient, Eliquis, Savasya, Brilinta or any other), please ask your surgeon how many days before scheduled procedure should you stop taking them. You may also need to verify with prescribing physician if it is OK to stop your blood thinners.      INSTRUCTIONS IMPORTANT!!  Do not eat anything after midnight. OK to drink clear liquids until 2 hours before arrival time.  ONLY if you are diabetic, check your sugar in the morning before your procedure.  Do not smoke, vape or drink alcoholic beverages 24 hours prior to your procedure.  If you wear contact lenses, dentures, hearing aids or glasses, bring a container to put them in during surgery and give to a family member for safe keeping.    Please leave all jewelry, piercing's and valuables at home.   Wear comfortable loose clothing and rubber soled shoes.  If your condition changes such as fever, cough, etc, please notify your surgeon.   ONLY if you have been diagnosed with sleep apnea please bring your C-PAP machine.  ONLY if you wear home oxygen please bring your portable oxygen tank the day of your procedure.   ONLY for patients requiring bowel  prep, written instructions will be given by your doctor's office.  ONLY if you have a neuro stimulator, please bring the controller with you the morning of surgery  Make arrangements in advance for transportation home by a responsible adult.  You must make arrangements for transportation, TAXI'S, UBER'S OR LYFTS ARE NOT ALLOWED.        If you have any questions about these instructions, call Endoscopy Nurse at [phone]

## 2025-06-17 ENCOUNTER — ANESTHESIA EVENT (OUTPATIENT)
Dept: SURGERY | Facility: HOSPITAL | Age: 65
End: 2025-06-17
Payer: MEDICARE

## 2025-06-18 ENCOUNTER — HOSPITAL ENCOUNTER (OUTPATIENT)
Facility: HOSPITAL | Age: 65
Discharge: HOME | End: 2025-06-18
Attending: STUDENT IN AN ORGANIZED HEALTH CARE EDUCATION/TRAINING PROGRAM | Admitting: STUDENT IN AN ORGANIZED HEALTH CARE EDUCATION/TRAINING PROGRAM
Payer: MEDICARE

## 2025-06-18 ENCOUNTER — ANESTHESIA (OUTPATIENT)
Dept: SURGERY | Facility: HOSPITAL | Age: 65
End: 2025-06-18
Payer: MEDICARE

## 2025-06-18 VITALS
TEMPERATURE: 99 F | OXYGEN SATURATION: 94 % | RESPIRATION RATE: 16 BRPM | SYSTOLIC BLOOD PRESSURE: 106 MMHG | HEART RATE: 88 BPM | DIASTOLIC BLOOD PRESSURE: 62 MMHG

## 2025-06-18 DIAGNOSIS — J44.9 CHRONIC OBSTRUCTIVE PULMONARY DISEASE, UNSPECIFIED COPD TYPE: Primary | ICD-10-CM

## 2025-06-18 PROCEDURE — 31629 BRONCHOSCOPY/NEEDLE BX EACH: CPT | Mod: 51,,, | Performed by: STUDENT IN AN ORGANIZED HEALTH CARE EDUCATION/TRAINING PROGRAM

## 2025-06-18 PROCEDURE — 63600175 PHARM REV CODE 636 W HCPCS: Performed by: STUDENT IN AN ORGANIZED HEALTH CARE EDUCATION/TRAINING PROGRAM

## 2025-06-18 PROCEDURE — 88305 TISSUE EXAM BY PATHOLOGIST: CPT | Mod: TC | Performed by: PATHOLOGY

## 2025-06-18 PROCEDURE — 94760 N-INVAS EAR/PLS OXIMETRY 1: CPT

## 2025-06-18 PROCEDURE — 94799 UNLISTED PULMONARY SVC/PX: CPT

## 2025-06-18 PROCEDURE — 31654 BRONCH EBUS IVNTJ PERPH LES: CPT | Performed by: STUDENT IN AN ORGANIZED HEALTH CARE EDUCATION/TRAINING PROGRAM

## 2025-06-18 PROCEDURE — 25000003 PHARM REV CODE 250

## 2025-06-18 PROCEDURE — 31652 BRONCH EBUS SAMPLNG 1/2 NODE: CPT | Mod: ,,, | Performed by: STUDENT IN AN ORGANIZED HEALTH CARE EDUCATION/TRAINING PROGRAM

## 2025-06-18 PROCEDURE — 31654 BRONCH EBUS IVNTJ PERPH LES: CPT | Mod: ,,, | Performed by: STUDENT IN AN ORGANIZED HEALTH CARE EDUCATION/TRAINING PROGRAM

## 2025-06-18 PROCEDURE — 99900031 HC PATIENT EDUCATION (STAT)

## 2025-06-18 PROCEDURE — 94618 PULMONARY STRESS TESTING: CPT

## 2025-06-18 PROCEDURE — 25000003 PHARM REV CODE 250: Performed by: STUDENT IN AN ORGANIZED HEALTH CARE EDUCATION/TRAINING PROGRAM

## 2025-06-18 PROCEDURE — 27201107 HC STYLET, STANDARD: Performed by: ANESTHESIOLOGY

## 2025-06-18 PROCEDURE — 27000679 HC ADAPTOR, BRONCHOSCOPE: Performed by: STUDENT IN AN ORGANIZED HEALTH CARE EDUCATION/TRAINING PROGRAM

## 2025-06-18 PROCEDURE — 27201114 HC TRAP (ANY): Performed by: STUDENT IN AN ORGANIZED HEALTH CARE EDUCATION/TRAINING PROGRAM

## 2025-06-18 PROCEDURE — 37000008 HC ANESTHESIA 1ST 15 MINUTES: Performed by: STUDENT IN AN ORGANIZED HEALTH CARE EDUCATION/TRAINING PROGRAM

## 2025-06-18 PROCEDURE — 27202107 HC XP QUATRO SENSOR: Performed by: ANESTHESIOLOGY

## 2025-06-18 PROCEDURE — 27202053 HC NEEDLE BIOPSY EUS: Performed by: STUDENT IN AN ORGANIZED HEALTH CARE EDUCATION/TRAINING PROGRAM

## 2025-06-18 PROCEDURE — 27000221 HC OXYGEN, UP TO 24 HOURS

## 2025-06-18 PROCEDURE — 31629 BRONCHOSCOPY/NEEDLE BX EACH: CPT | Performed by: STUDENT IN AN ORGANIZED HEALTH CARE EDUCATION/TRAINING PROGRAM

## 2025-06-18 PROCEDURE — 37000009 HC ANESTHESIA EA ADD 15 MINS: Performed by: STUDENT IN AN ORGANIZED HEALTH CARE EDUCATION/TRAINING PROGRAM

## 2025-06-18 PROCEDURE — 31652 BRONCH EBUS SAMPLNG 1/2 NODE: CPT | Performed by: STUDENT IN AN ORGANIZED HEALTH CARE EDUCATION/TRAINING PROGRAM

## 2025-06-18 PROCEDURE — 27000673 HC TUBING BLOOD Y: Performed by: ANESTHESIOLOGY

## 2025-06-18 PROCEDURE — 63600175 PHARM REV CODE 636 W HCPCS

## 2025-06-18 PROCEDURE — 99900035 HC TECH TIME PER 15 MIN (STAT)

## 2025-06-18 RX ORDER — OXYCODONE HYDROCHLORIDE 5 MG/1
5 TABLET ORAL
Status: DISCONTINUED | OUTPATIENT
Start: 2025-06-18 | End: 2025-06-18 | Stop reason: HOSPADM

## 2025-06-18 RX ORDER — FAMOTIDINE 10 MG/ML
INJECTION, SOLUTION INTRAVENOUS
Status: DISCONTINUED | OUTPATIENT
Start: 2025-06-18 | End: 2025-06-18

## 2025-06-18 RX ORDER — SODIUM CHLORIDE, SODIUM LACTATE, POTASSIUM CHLORIDE, CALCIUM CHLORIDE 600; 310; 30; 20 MG/100ML; MG/100ML; MG/100ML; MG/100ML
INJECTION, SOLUTION INTRAVENOUS CONTINUOUS PRN
Status: DISCONTINUED | OUTPATIENT
Start: 2025-06-18 | End: 2025-06-18

## 2025-06-18 RX ORDER — PROPOFOL 10 MG/ML
VIAL (ML) INTRAVENOUS
Status: DISCONTINUED | OUTPATIENT
Start: 2025-06-18 | End: 2025-06-18

## 2025-06-18 RX ORDER — ONDANSETRON HYDROCHLORIDE 2 MG/ML
4 INJECTION, SOLUTION INTRAVENOUS DAILY PRN
Status: DISCONTINUED | OUTPATIENT
Start: 2025-06-18 | End: 2025-06-18 | Stop reason: HOSPADM

## 2025-06-18 RX ORDER — DIPHENHYDRAMINE HYDROCHLORIDE 50 MG/ML
12.5 INJECTION, SOLUTION INTRAMUSCULAR; INTRAVENOUS
Status: DISCONTINUED | OUTPATIENT
Start: 2025-06-18 | End: 2025-06-18 | Stop reason: HOSPADM

## 2025-06-18 RX ORDER — MEPERIDINE HYDROCHLORIDE 50 MG/ML
12.5 INJECTION INTRAMUSCULAR; INTRAVENOUS; SUBCUTANEOUS EVERY 10 MIN PRN
Status: DISCONTINUED | OUTPATIENT
Start: 2025-06-18 | End: 2025-06-18 | Stop reason: HOSPADM

## 2025-06-18 RX ORDER — ONDANSETRON HYDROCHLORIDE 2 MG/ML
INJECTION, SOLUTION INTRAVENOUS
Status: DISCONTINUED | OUTPATIENT
Start: 2025-06-18 | End: 2025-06-18

## 2025-06-18 RX ORDER — SUCCINYLCHOLINE CHLORIDE 20 MG/ML
INJECTION INTRAMUSCULAR; INTRAVENOUS
Status: DISCONTINUED | OUTPATIENT
Start: 2025-06-18 | End: 2025-06-18

## 2025-06-18 RX ORDER — FENTANYL CITRATE 50 UG/ML
25 INJECTION, SOLUTION INTRAMUSCULAR; INTRAVENOUS EVERY 5 MIN PRN
Status: DISCONTINUED | OUTPATIENT
Start: 2025-06-18 | End: 2025-06-18 | Stop reason: HOSPADM

## 2025-06-18 RX ORDER — GLUCAGON 1 MG
1 KIT INJECTION
Status: DISCONTINUED | OUTPATIENT
Start: 2025-06-18 | End: 2025-06-18 | Stop reason: HOSPADM

## 2025-06-18 RX ORDER — LIDOCAINE HYDROCHLORIDE 20 MG/ML
INJECTION INTRAVENOUS
Status: DISCONTINUED | OUTPATIENT
Start: 2025-06-18 | End: 2025-06-18

## 2025-06-18 RX ORDER — LIDOCAINE HYDROCHLORIDE 10 MG/ML
INJECTION, SOLUTION INFILTRATION; PERINEURAL
Status: COMPLETED | OUTPATIENT
Start: 2025-06-18 | End: 2025-06-18

## 2025-06-18 RX ORDER — ROCURONIUM BROMIDE 10 MG/ML
INJECTION, SOLUTION INTRAVENOUS
Status: DISCONTINUED | OUTPATIENT
Start: 2025-06-18 | End: 2025-06-18

## 2025-06-18 RX ADMIN — SUGAMMADEX 200 MG: 100 INJECTION, SOLUTION INTRAVENOUS at 10:06

## 2025-06-18 RX ADMIN — ROCURONIUM BROMIDE 5 MG: 10 INJECTION, SOLUTION INTRAVENOUS at 10:06

## 2025-06-18 RX ADMIN — ONDANSETRON 4 MG: 2 INJECTION INTRAMUSCULAR; INTRAVENOUS at 10:06

## 2025-06-18 RX ADMIN — FAMOTIDINE 20 MG: 10 INJECTION, SOLUTION INTRAVENOUS at 10:06

## 2025-06-18 RX ADMIN — ROCURONIUM BROMIDE 35 MG: 10 INJECTION, SOLUTION INTRAVENOUS at 10:06

## 2025-06-18 RX ADMIN — Medication 100 MG: at 10:06

## 2025-06-18 RX ADMIN — SODIUM CHLORIDE, SODIUM LACTATE, POTASSIUM CHLORIDE, AND CALCIUM CHLORIDE: .6; .31; .03; .02 INJECTION, SOLUTION INTRAVENOUS at 09:06

## 2025-06-18 RX ADMIN — LIDOCAINE HYDROCHLORIDE 80 MG: 20 INJECTION, SOLUTION INTRAVENOUS at 10:06

## 2025-06-18 RX ADMIN — TRANEXAMIC ACID 500 MG: 1 INJECTION, SOLUTION INTRAVENOUS at 11:06

## 2025-06-18 RX ADMIN — PROPOFOL 100 MG: 10 INJECTION, EMULSION INTRAVENOUS at 10:06

## 2025-06-18 NOTE — PROCEDURES
EBUS/BRONCHOSCOPY PROCEDURE    Indication:      Lung mass     Consent: The benefits, risks, and alternatives of the procedure were discussed, and informed consent was obtained from the patient.    Intra-procedural medications: See nurse note. The patient underwent the procedure with general anesthesia; see anesthesiology records for further details.    Time Out: A time out was performed prior initiation of the procedure.    Procedure: The bronchoscope was passed with ease through the ET tube. The patient tolerated the procedure well. The views were adequate.    Findings:  Video Bronchoscopy  *Overall:     First the EBUS scope was inserted the airway to evaluate for the right-sided lung mass which was greater than 38 mm in size, 6 total passes were made with a 25 gauge needle and a 19 gauge needle.  The PAMELA was positive at the site for malignancy..    Then the EBUS scope was advanced to station 7, which was greater than 30 mm in size, 2 passes were made with a 25 gauge needle.    Then the EBUS scope was advanced to the left hilum, was not able to visualize a discrete lymph nodes with well-delineated borders.    In the EBUS scope was removed and the diagnostic scope was inserted in the airway.  All airways on the right and left side were examined down to subsegmental levels.    In the right bronchus intermedius there was a vascular appearing endobronchial mass obstructing the right bronchus intermedius.  It obstructed the airway by approximately 90%.  It appeared to be attached primarily to the posterior wall of the airway.  With the use of push saline, was able to advance beyond the endobronchial mass.  I could only visualize the subsegments of the right lower lobe, but was not able to identify which particular subsegments they were.  The lung mass appears to be obstructing the right upper lobe and the entrance into the right middle lobe.     A bronchial wash using cold saline was performed in the right lower lobe,  80 cc sterile normal saline instilled, 40 cc return which was blood mixed with mucus..     Topical epinephrine was applied to the airways.     There was no evidence of ongoing bleeding or oozing.    Unplanned Events:  *There were no unplanned events. The patient's vital signs remained normal throughout the procedure.  *ETT was removed in the procedure room.  *Patient was sent in stable condition to recovery area.    Estimated Blood Loss: Minimal.    Fig 1. Endobronchial mass in the RBI       Fig 2. Lung mass on EBUS scope       Fig 3. Scope passed beyond lesion, distal is RLL sub segments         Recommendations/Plans:  Follow up in Oncology clinic as scheduled.    Luis M Sandoval MD  Pulmonary and Critical Care Medicine  [facility]  06/18/2025  10:55 AM

## 2025-06-18 NOTE — CARE UPDATE
06/18/25 1201   Patient Assessment/Suction   Level of Consciousness (AVPU) alert   Home Oxygen Qualification   $ Home O2 Qualification Pulmonary Stress Test/6 min walk;Tech time 15 minutes   Room Air SpO2 At Rest 92 %   Room Air SpO2 During Ambulation 94 %   SpO2 During Ambulation on O2 90 %   Heart Rate on O2 85 bpm   Ambulation O2 LPM 5 LPM   SpO2 Post Ambulation 93 %   Post Ambulation Heart Rate 88 bpm   Post Ambulation O2 LPM 5 LPM   Home O2 Eval Comments pt qualifies for home O2. Pt walked on 2L SpO2 85%, 3L SpO2 87%, 4L SpO2 88%. Dr. Sandoval notified of the high requuirement of O2

## 2025-06-18 NOTE — ANESTHESIA PROCEDURE NOTES
Intubation    Date/Time: 6/18/2025 10:10 AM    Performed by: Tanika Fischer CRNA  Authorized by: Eugenio Velasquez MD    Intubation:     Induction:  Intravenous    Intubated:  Postinduction    Mask Ventilation:  Easy with oral airway    Attempts:  1    Attempted By:  CRNA    Method of Intubation:  Video laryngoscopy    Blade:  Robles 3    Laryngeal View Grade: Grade I - full view of cords      Difficult Airway Encountered?: No      Complications:  None    Airway Device:  Oral endotracheal tube    Airway Device Size:  9.0    Style/Cuff Inflation:  Cuffed (inflated to minimal occlusive pressure)    Tube secured:  22    Secured at:  The lips    Placement Verified By:  Capnometry    Complicating Factors:  None    Findings Post-Intubation:  BS equal bilateral and atraumatic/condition of teeth unchanged

## 2025-06-18 NOTE — PLAN OF CARE
Per Dave FAYE with [employer], okay to pull 1 E-tank set up.     SW delivered 1 E-tank to patient's bedside.  Patient/Family signed delivery ticket and/or rental agreement.  [facility] DME information and equipment instructions provided to patient/family.  Completed/Signed paperwork returned to DME closet completed tray.       06/18/25 1547   Post-Acute Status   Post-Acute Authorization HME   HME Status Set-up Complete/Auth obtained

## 2025-06-18 NOTE — PRE ADMISSION SCREENING
Informed Dr Tyler of platelet # changes, no new orders, secure chat sent to Dr Sandoval group and pt primary care group, awaiting message back. Will inform endo nurses to inform Dr Sandoval on day of procedure

## 2025-06-18 NOTE — TRANSFER OF CARE
Anesthesia Transfer of Care Note    Patient: Vlad Gonzales    Procedure(s) Performed: Procedure(s) (LRB):  ENDOBRONCHIAL ULTRASOUND (EBUS) (N/A)    Patient location: PACU    Anesthesia Type: general    Transport from OR: Transported from OR on 2-3 L/min O2 by NC with adequate spontaneous ventilation    Post pain: adequate analgesia    Post assessment: no apparent anesthetic complications and tolerated procedure well    Post vital signs: stable    Level of consciousness: awake    Nausea/Vomiting: no nausea/vomiting    Complications: none    Transfer of care protocol was followed      Last vitals: Visit Vitals  /67 (BP Location: Left arm, Patient Position: Lying)   Pulse 79   Temp 36.5 °C (97.7 °F) (Tympanic)   Resp 16   SpO2 95%

## 2025-06-18 NOTE — ANESTHESIA PREPROCEDURE EVALUATION
06/18/2025  Vlad Gonzales is a 64 y.o., male.      Problem List[1]    History reviewed. No pertinent surgical history.     Tobacco Use:  The patient  reports that he has been smoking cigarettes. He does not have any smokeless tobacco history on file.     Results for orders placed or performed during the hospital encounter of 06/16/25   EKG 12-lead    Collection Time: 06/16/25  3:13 PM   Result Value Ref Range    QRS Duration 92 ms    OHS QTC Calculation 399 ms    Narrative    Test Reason : Z01.818,    Vent. Rate :  82 BPM     Atrial Rate :  82 BPM     P-R Int : 140 ms          QRS Dur :  92 ms      QT Int : 342 ms       P-R-T Axes :  76  80  73 degrees    QTcB Int : 399 ms    Normal sinus rhythm  Normal ECG  When compared with ECG of 20-Mar-2020 05:49,  Premature ventricular complexes are no longer Present    Referred By:            Confirmed By:              Lab Results   Component Value Date    WBC 11.68 06/16/2025    HGB 11.4 (L) 06/16/2025    HCT 35.0 (L) 06/16/2025    MCV 85 06/16/2025     (H) 06/16/2025     BMP  Lab Results   Component Value Date     (L) 06/16/2025    K 4.0 06/16/2025    CL 96 06/16/2025    CO2 27 06/16/2025    BUN 13 06/16/2025    CREATININE 0.6 06/16/2025    CALCIUM 10.4 06/16/2025    ANIONGAP 8 06/16/2025     06/16/2025    GLU 97 02/26/2025    GLU 81 03/20/2020       No results found for this or any previous visit.            Pre-op Assessment    I have reviewed the Patient Summary Reports.     I have reviewed the Nursing Notes. I have reviewed the NPO Status.   I have reviewed the Medications.     Review of Systems  Anesthesia Hx:             Denies Family Hx of Anesthesia complications.    Denies Personal Hx of Anesthesia complications.                    Social:  Smoker, Alcohol Use       Hematology/Oncology:  Hematology Normal                      Current/Recent Cancer. (Probable right lung cancer, with no metastases per PET scan)                EENT/Dental:  EENT/Dental Normal           Cardiovascular:     Hypertension   CAD               History of bigeminy                           Pulmonary:   COPD   Shortness of breath   Large right lung mass, with right mainstem bronchial obstruction and extensive right lower lobe atelectasis.               Renal/:  Renal/ Normal                 Hepatic/GI:  Hepatic/GI Normal                    Musculoskeletal:  Musculoskeletal Normal                Neurological:  Neurology Normal                                      Endocrine:  Endocrine Normal            Psych:  Psychiatric Normal                    Physical Exam  General: Cooperative, Alert and Oriented    Airway:  Mallampati: III   Mouth Opening: Normal  TM Distance: > 6 cm  Tongue: Normal  Neck ROM: Normal ROM    Dental:  Intact    Chest/Lungs:  Clear to auscultation, Normal Respiratory Rate    Heart:  Rate: Normal  Rhythm: Regular Rhythm  Sounds: Normal        Anesthesia Plan  Type of Anesthesia, risks & benefits discussed:    Anesthesia Type: Gen ETT  Intra-op Monitoring Plan: Standard ASA Monitors  Post Op Pain Control Plan: multimodal analgesia  Induction:  IV  Airway Plan: Video, Post-Induction  Informed Consent: Informed consent signed with the Patient and all parties understand the risks and agree with anesthesia plan.  All questions answered.   ASA Score: 3  Anesthesia Plan Notes:   GETA  LTA  IV tylenol  Zofran Decadron Pepcid    Ready For Surgery From Anesthesia Perspective.     .           [1]   Patient Active Problem List  Diagnosis    Bigeminy    HTN (hypertension)    CAD in native artery    Tobacco abuse    SOB (shortness of breath)    Alcohol use    Chest pain, non-cardiac    Acute pain of left shoulder    Chronic obstructive pulmonary disease, unspecified COPD type

## 2025-06-18 NOTE — ANESTHESIA POSTPROCEDURE EVALUATION
Anesthesia Post Evaluation    Patient: Vlad Gonzales    Procedure(s) Performed: Procedure(s) (LRB):  ENDOBRONCHIAL ULTRASOUND (EBUS) (N/A)    Final Anesthesia Type: general      Patient location during evaluation: PACU  Patient participation: Yes- Able to Participate  Level of consciousness: awake and alert  Post-procedure vital signs: reviewed and stable  Pain management: adequate  Airway patency: patent    PONV status at discharge: No PONV  Anesthetic complications: no      Cardiovascular status: blood pressure returned to baseline and stable  Respiratory status: unassisted and room air  Hydration status: euvolemic  Follow-up not needed.  Comments: Baseline sats 88%              Vitals Value Taken Time   /62 06/18/25 11:30   Temp 36.9 °C (98.5 °F) 06/18/25 11:00   Pulse 80 06/18/25 11:38   Resp 18 06/18/25 11:38   SpO2 90% 06/18/25 11:38   Vitals shown include unfiled device data.      No case tracking events are documented in the log.      Pain/Tamir Score: Tamir Score: 9 (6/18/2025 11:30 AM)

## 2025-06-18 NOTE — INTERVAL H&P NOTE
The patient has been examined and the H&P has been reviewed:    I concur with the findings and no changes have occurred since H&P was written.    Procedure risks, benefits and alternative options discussed and understood by patient/family.          Mr Gonzales has known severe COPD- PFTs suggest severe COPD and evidence of underling emphseyma. He also has a lung mass evident on the RBI on the right lung. Plan for oxygen walk test today as he is hypoxemic on room air to 83%.       There are no hospital problems to display for this patient.

## 2025-06-18 NOTE — CARE UPDATE
06/18/25 1201   Room Air SpO2 At Rest   Room Air SpO2 At Rest 92 %   Ambulation SpO2 Evaluation on Room Air   Room Air SpO2 During Ambulation (!) 84 %   Ambulation SpO2 Evaluation on O2   SpO2 During Ambulation on O2 90 %   Heart Rate on O2 85 bpm   Ambulation O2 LPM 5 LPM   SpO2 On Post Ambulation   SpO2 Post Ambulation 93 %   Post Ambulation Heart Rate 88 bpm   Post Ambulation O2 LPM 5 LPM   Home O2 Eval Comments pt qualifies for home O2. Pt walked on 2L SpO2 85%, 3L SpO2 87%, 4L SpO2 88%. Dr. Sandoval notified of the high requuirement of O2

## 2025-06-19 ENCOUNTER — TELEPHONE (OUTPATIENT)
Facility: CLINIC | Age: 65
End: 2025-06-19
Payer: MEDICARE

## 2025-06-19 DIAGNOSIS — R91.8 LUNG MASS: Primary | ICD-10-CM

## 2025-06-19 LAB
OHS QRS DURATION: 92 MS
OHS QTC CALCULATION: 399 MS

## 2025-06-20 ENCOUNTER — SOCIAL WORK (OUTPATIENT)
Dept: HEMATOLOGY/ONCOLOGY | Facility: CLINIC | Age: 65
End: 2025-06-20
Payer: MEDICARE

## 2025-06-20 ENCOUNTER — TELEPHONE (OUTPATIENT)
Facility: CLINIC | Age: 65
End: 2025-06-20
Payer: MEDICARE

## 2025-06-20 ENCOUNTER — OFFICE VISIT (OUTPATIENT)
Dept: RADIATION ONCOLOGY | Facility: CLINIC | Age: 65
End: 2025-06-20
Payer: MEDICARE

## 2025-06-20 VITALS
SYSTOLIC BLOOD PRESSURE: 100 MMHG | WEIGHT: 119 LBS | HEART RATE: 96 BPM | BODY MASS INDEX: 17.57 KG/M2 | DIASTOLIC BLOOD PRESSURE: 63 MMHG | TEMPERATURE: 98 F

## 2025-06-20 DIAGNOSIS — C34.31 MALIGNANT NEOPLASM OF LOWER LOBE OF RIGHT LUNG: Primary | ICD-10-CM

## 2025-06-20 DIAGNOSIS — R91.8 LUNG MASS: ICD-10-CM

## 2025-06-20 DIAGNOSIS — C34.90 MALIGNANT NEOPLASM OF LUNG, UNSPECIFIED LATERALITY, UNSPECIFIED PART OF LUNG: ICD-10-CM

## 2025-06-20 DIAGNOSIS — R91.8 LUNG MASS: Primary | ICD-10-CM

## 2025-06-20 NOTE — PROGRESS NOTES
Vlad Gonzales  [id number]  1960 6/20/2025  Luis M Sandoval Md  [address]  [address]  [place] [address]    Dx: Stage IIIA  [[id number]]  NSLCC-SCC of RLL    HISTORY OF PRESENT ILLNESS:   Mr. Gonzales is a 64M smoker who endorses being found w/ a ~5cm RLL mass ~3 yrs ago at [facility] while admitted for PNA, however he declined w/u and tx as it was too far from his home.  He did not f/u elsewhere either.    More recently, he was found on repeat imaging to have progressive growth of his RLL mass to ~12cm.  Thus biopsy and PET/CT were perfomed that provided dx/stage of NSCLC as above.    The patient has now been referred to [facility] for eval/discussion re: tx options.    He endorses that he cannot walk to his bathroom across his home without having GARSIA/SOB/coughing, and he has lost ~40# over the last several months.    PET/CT (6/3/25):      REVIEW OF SYSTEMS:  A complete ROS was performed and the patient denies any acute changes/concerns other than per HPI.    Past Medical History:   Diagnosis Date    Coronary artery disease     cardiac stents    Hypertension      Past Surgical History:   Procedure Laterality Date    ENDOBRONCHIAL ULTRASOUND N/A 6/18/2025    Procedure: ENDOBRONCHIAL ULTRASOUND (EBUS);  Surgeon: Luis M Sandoval MD;  Location: [facility];  Service: Pulmonary;  Laterality: N/A;     Social History     Socioeconomic History    Marital status:    Tobacco Use    Smoking status: Every Day     Current packs/day: 2.00     Types: Cigarettes   Substance and Sexual Activity    Alcohol use: Not Currently     Comment: occas     Social Drivers of Health     Financial Resource Strain: High Risk (4/26/2025)    Received from [facility] SDOH Screening     In the past year, have you been unable to get any of the following when you really needed them? choose all that apply.: Internet   Food Insecurity: High Risk (4/26/2025)    Received from [facility] SDOH Screening     In the  past 2 months, did you or others you live with eat smaller meals or skip meals because you didn't have money for food?: Yes   Transportation Needs: High Risk (4/26/2025)    Received from [facility] SDOH Screening     Has lack of transportation kept you from medical appointments, meetings, work or from getting things needed for daily living? choose all that apply.: Yes, it has kept me from medical appointments or from getting my medications     Has lack of transportation kept you from medical appointments, meetings, work or from getting things needed for daily living? choose all that apply.: Yes, it has kept me from non-medical meetings, appointments, work or from getting things that i need   Physical Activity: Insufficiently Active (10/17/2024)    Received from [facility]    Exercise Vital Sign     Days of Exercise per Week: 5 days     Minutes of Exercise per Session: 20 min   Stress: Patient Declined (10/18/2024)    Received from [facility] and [facility]    [employer] [place] [employer] - Occupational Stress Questionnaire     Feeling of Stress : Patient declined   Housing Stability: Low Risk  (10/17/2024)    Received from [facility] and [facility]    Housing Stability Vital Sign     Unable to Pay for Housing in the Last Year: No     Number of Times Moved in the Last Year: 1     Homeless in the Last Year: No     No family history on file.    PRIOR HISTORY OF CHEMOTHERAPY OR RADIOTHERAPY: Please see HPI for patients prior oncologic history.    Medication List with Changes/Refills   Current Medications    ALBUTEROL (PROVENTIL) 2.5 MG /3 ML (0.083 %) NEBULIZER SOLUTION    Take 3 mLs (2.5 mg total) by nebulization every 6 (six) hours as needed for Wheezing or Shortness of Breath. Rescue    ALBUTEROL (PROVENTIL/VENTOLIN HFA) 90 MCG/ACTUATION INHALER    Inhale 2 puffs into the lungs.    ASPIRIN (ECOTRIN) 81 MG EC  TABLET    Take 1 tablet (81 mg total) by mouth once daily.    BUDESONIDE-GLYCOPYR-FORMOTEROL (BREZTRI AEROSPHERE) 160-9-4.8 MCG/ACTUATION HFAA    Inhale 2 puffs into the lungs 2 (two) times a day.    LISINOPRIL (PRINIVIL,ZESTRIL) 20 MG TABLET    20 mg.     Review of patient's allergies indicates:  No Known Allergies    QUALITY OF LIFE: 70%- Cares for Self: Unable to Carry on Normal Activity or Active Work    There were no vitals filed for this visit.  There is no height or weight on file to calculate BMI.    PHYSICAL EXAM:  GENERAL: alert; in no apparent distress.   HEAD: normocephalic, atraumatic.  EYES: pupils are equal, round, reactive to light and accommodation. Sclera anicteric. Conjunctiva not injected.   NOSE/THROAT: no nasal erythema or rhinorrhea. Oropharynx pink, without erythema, ulcerations or thrush.   NECK: no cervical motion rigidity; supple with no masses.  CHEST: Patient is speaking comfortably on room air with normal work of breathing without using accessory muscles of respiration.  CARDIOVASCULAR: regular rate and rhythm; no murmurs, rubs or gallops.  ABDOMEN: soft, nontender, nondistended. Bowel sounds present.   MUSCULOSKELETAL: no tenderness to palpation along the spine or scapulae. Normal range of motion.  NEUROLOGIC: cranial nerves II-XII intact bilaterally. Strength 5/5 in bilateral upper and lower extremities. No sensory deficits appreciated. Reflexes globally intact. No cerebellar signs. Normal gait.  LYMPHATIC: no cervical, supraclavicular or axillary adenopathy appreciated bilaterally.   EXTREMITIES: no clubbing, cyanosis, edema.    REVIEW OF IMAGING/PATHOLOGY/LABS: Please see HPI. All images reviewed personally by dictating physician.       ASSESSMENT: Vlad Gonzales is a 64 y.o. male w/ stage IIIA  [[id number]]  NSLCC-SCC of RLL    PLAN:  After complete review of the patient's chart/hx and eval/discussion w/ the patient today, I explained that, while his locally advanced disease could  be treated surgically, I reviewed his images, PS, and PFT's w/ Dr. Shi today who agreed that he is a poor surgical candidate.      Thus definitive CRT via IMRT x6 weeks being recommended, and [facility] as well as for brain MRI and port placement.    I spent over one hour in consultation with the patient discussing the rationales, risks, benefits, and alternatives to thoracic CRT in this setting, as well as the potential toxicities, including but not limited to fatigue, skin irritation/erythema/desquamation, chronic chest wall pain, hemoptysis due to bronchial wall damage, spinal cord myelopathy, pericarditis, pneumothorax, pneumonitis requiring steroid therapy, respiratory distress, pulmonary fibrosis possibly leading to oxygen therapy dependence, esophagitis causing dysphagia/odynophagia or chronic TE fistula, increased lifetime risk of cardiac disease/events, brachial plexopathy causing pain/weakness/paralysis/parasthesias of LUE, and secondary malignancy.  I carefully explained the process of simulation and treatment delivery with weekly physician visits.    The patient verbalized understanding of the above plan, risks, benefits, and details, and informed consent was obtained.  We will proceed with RTP-CT imaging with plans to initiate RT w/in the next 1-2 weeks.  Contact info was exchanged, and the patient was encouraged to contact our clinic with any questions, needs, or concerns.    DISPOSITION: RTC AFTER FURTHER WORKUP    I have personally seen and evaluated this patient. Greater than 50% of this time was spent discussing coordination of care and/or counseling.    PHYSICIAN: Sidney Mitchell III, MD    Thank you for the opportunity to meet and consult with Vlad Gonzales.   Please feel free to contact me to discuss the above recommendation further.

## 2025-06-20 NOTE — PROGRESS NOTES
Vlad Gonzales is a 64 year old diagnosed with lung cancer.  Patient is here today, accompanied by a friend, for a radiation consult with Dr. Mitchell.  I met with patient to complete new patient orientation and the NCCN Distress Screening; patient indicated a rating of 2.  Patient denied needing mental health supports at this time.  I provided patient with the number to the Office of Aging & Adult Services for information for waiver services.  I provided him with the number to [facility]; he currently relies on [email or web address] transportation and his friend.  I provided patient and his friend with my contact information in the event supportive services are needed in the future.

## 2025-06-20 NOTE — NURSING
Oncology Navigation   Intake  Type of Referral: Internal  Date of Referral: 06/20/25  Initial Nurse Navigator Contact: 06/20/25  Referral to Initial Contact Timeline (days): 0  Appointment Date: 06/23/25     Treatment                          Support Systems: Friends / neighbors  Barriers of Care: Financial concerns; Social needs  Financial Concerns: Financial hardship  Social Needs: Transportation needs; Other  Other Social Needs: caregiver needed     Acuity      Follow Up  No follow-ups on file.     Spoke with patient to schedule some upcoming appointments. Patient asked me to speak with his neighbor and friend (Ms Suman Morales) because she helps him with keeping track of his appointments and often provides transportation to his appointments. Appointments for MRI of the Brain, General Surgeon (for PORT placement) and Medical Oncology made and she verbalized agreement and understanding of appointment times and dates.

## 2025-06-22 ENCOUNTER — HOSPITAL ENCOUNTER (OUTPATIENT)
Dept: RADIOLOGY | Facility: HOSPITAL | Age: 65
Discharge: HOME | End: 2025-06-22
Attending: RADIOLOGY
Payer: MEDICARE

## 2025-06-22 DIAGNOSIS — R91.8 LUNG MASS: ICD-10-CM

## 2025-06-22 DIAGNOSIS — C34.90 MALIGNANT NEOPLASM OF LUNG, UNSPECIFIED LATERALITY, UNSPECIFIED PART OF LUNG: ICD-10-CM

## 2025-06-22 PROCEDURE — A9585 GADOBUTROL INJECTION: HCPCS | Performed by: RADIOLOGY

## 2025-06-22 PROCEDURE — 70553 MRI BRAIN STEM W/O & W/DYE: CPT | Mod: 26,,, | Performed by: RADIOLOGY

## 2025-06-22 PROCEDURE — 70553 MRI BRAIN STEM W/O & W/DYE: CPT | Mod: TC

## 2025-06-22 PROCEDURE — 25500020 PHARM REV CODE 255: Performed by: RADIOLOGY

## 2025-06-22 RX ORDER — GADOBUTROL 604.72 MG/ML
5 INJECTION INTRAVENOUS
Status: COMPLETED | OUTPATIENT
Start: 2025-06-22 | End: 2025-06-22

## 2025-06-22 RX ADMIN — GADOBUTROL 5 ML: 604.72 INJECTION INTRAVENOUS at 11:06

## 2025-06-23 ENCOUNTER — TELEPHONE (OUTPATIENT)
Facility: CLINIC | Age: 65
End: 2025-06-23
Payer: MEDICARE

## 2025-06-23 ENCOUNTER — OFFICE VISIT (OUTPATIENT)
Dept: HEMATOLOGY/ONCOLOGY | Facility: CLINIC | Age: 65
End: 2025-06-23
Payer: MEDICARE

## 2025-06-23 ENCOUNTER — SOCIAL WORK (OUTPATIENT)
Dept: HEMATOLOGY/ONCOLOGY | Facility: CLINIC | Age: 65
End: 2025-06-23

## 2025-06-23 VITALS
OXYGEN SATURATION: 96 % | HEIGHT: 69 IN | BODY MASS INDEX: 17.08 KG/M2 | DIASTOLIC BLOOD PRESSURE: 63 MMHG | RESPIRATION RATE: 18 BRPM | TEMPERATURE: 99 F | WEIGHT: 115.31 LBS | SYSTOLIC BLOOD PRESSURE: 98 MMHG | HEART RATE: 86 BPM

## 2025-06-23 DIAGNOSIS — R63.4 LOSS OF WEIGHT: Primary | ICD-10-CM

## 2025-06-23 DIAGNOSIS — R91.8 LUNG MASS: ICD-10-CM

## 2025-06-23 DIAGNOSIS — C34.90 MALIGNANT NEOPLASM OF LUNG, UNSPECIFIED LATERALITY, UNSPECIFIED PART OF LUNG: ICD-10-CM

## 2025-06-23 DIAGNOSIS — C34.31 MALIGNANT NEOPLASM OF LOWER LOBE OF RIGHT LUNG: ICD-10-CM

## 2025-06-23 PROCEDURE — 1126F AMNT PAIN NOTED NONE PRSNT: CPT | Mod: CPTII,S$GLB,, | Performed by: INTERNAL MEDICINE

## 2025-06-23 PROCEDURE — 99999 PR PBB SHADOW E&M-EST. PATIENT-LVL V: CPT | Mod: PBBFAC,,, | Performed by: INTERNAL MEDICINE

## 2025-06-23 PROCEDURE — 1159F MED LIST DOCD IN RCRD: CPT | Mod: CPTII,S$GLB,, | Performed by: INTERNAL MEDICINE

## 2025-06-23 PROCEDURE — 3078F DIAST BP <80 MM HG: CPT | Mod: CPTII,S$GLB,, | Performed by: INTERNAL MEDICINE

## 2025-06-23 PROCEDURE — 3008F BODY MASS INDEX DOCD: CPT | Mod: CPTII,S$GLB,, | Performed by: INTERNAL MEDICINE

## 2025-06-23 PROCEDURE — 4010F ACE/ARB THERAPY RXD/TAKEN: CPT | Mod: CPTII,S$GLB,, | Performed by: INTERNAL MEDICINE

## 2025-06-23 PROCEDURE — 99205 OFFICE O/P NEW HI 60 MIN: CPT | Mod: S$GLB,,, | Performed by: INTERNAL MEDICINE

## 2025-06-23 PROCEDURE — 1101F PT FALLS ASSESS-DOCD LE1/YR: CPT | Mod: CPTII,S$GLB,, | Performed by: INTERNAL MEDICINE

## 2025-06-23 PROCEDURE — 3288F FALL RISK ASSESSMENT DOCD: CPT | Mod: CPTII,S$GLB,, | Performed by: INTERNAL MEDICINE

## 2025-06-23 PROCEDURE — 3074F SYST BP LT 130 MM HG: CPT | Mod: CPTII,S$GLB,, | Performed by: INTERNAL MEDICINE

## 2025-06-23 RX ORDER — MEGESTROL ACETATE 40 MG/1
40 TABLET ORAL 2 TIMES DAILY
Qty: 60 TABLET | Refills: 11 | Status: SHIPPED | OUTPATIENT
Start: 2025-06-23 | End: 2026-06-23

## 2025-06-23 NOTE — PROGRESS NOTES
Patient was signed up for the food pantry and provided an emergency bag of food.  Patient was also provided a $25 gas card from the [employer] transportation [place].

## 2025-06-23 NOTE — PLAN OF CARE
START ON PATHWAY REGIMEN - Non-Small Cell Lung    [id number]        Paclitaxel       Carboplatin           Additional Orders: Chemotherapy is given concurrently with radiation.    **Always confirm dose/schedule in your pharmacy ordering system**    Patient Characteristics:  Preoperative or Nonsurgical Candidate (Clinical Staging), Stage IIB (N2a only)   or Stage III - Nonsurgical Candidate, PS = 0,1  Therapeutic Status: Preoperative or Nonsurgical Candidate (Clinical Staging)  AJCC T Category: cT4  AJCC N Category: cN1  AJCC M Category: cM0  AJCC 9 Stage Grouping: IIIA  Check here if patient was staged using an edition other than AJCC Staging 9th   Edition: false  ECOG Performance Status: 1  Intent of Therapy:  Curative Intent, Not Discussed with Patient

## 2025-06-23 NOTE — NURSING
Oncology Navigation   Intake  Type of Referral: Internal  Date of Referral: 06/20/25  Initial Nurse Navigator Contact: 06/20/25  Referral to Initial Contact Timeline (days): 0  Appointment Date: 06/23/25     Treatment                          Support Systems: Friends / neighbors  Barriers of Care: Financial concerns; Social needs  Financial Concerns: Financial hardship  Social Needs: Transportation needs; Other  Other Social Needs: caregiver needed     Acuity      Follow Up  No follow-ups on file.     Met patient and his neighbor/friend at appt with Medical Oncologist (Dr Sheets). Questions answered, care coordinated for radiation appt tomorrow.

## 2025-06-23 NOTE — PROGRESS NOTES
HPI    64 years old male newly discovered squamous cell carcinoma of the right lung.  Active smoker.  PET scan shows no distant metastases.  MRI of the brain is negative for malignancy involvement.  Clinical staging T4 N1 M0.  Patient's saw Radiation Oncology already.  He is not a surgical candidate.  Planning on concurrent chemoradiation x6 weeks followed by immunotherapy consolidation.    Abnormal weight loss.      Past Medical History:   Diagnosis Date    Coronary artery disease     cardiac stents    Hypertension      Social History     Socioeconomic History    Marital status:    Tobacco Use    Smoking status: Every Day     Current packs/day: 2.00     Types: Cigarettes   Substance and Sexual Activity    Alcohol use: Not Currently     Comment: occas     Social Drivers of Health     Financial Resource Strain: High Risk (4/26/2025)    Received from [facility] SDOH Screening     In the past year, have you been unable to get any of the following when you really needed them? choose all that apply.: Internet   Food Insecurity: High Risk (4/26/2025)    Received from [facility] SDOH Screening     In the past 2 months, did you or others you live with eat smaller meals or skip meals because you didn't have money for food?: Yes   Transportation Needs: High Risk (4/26/2025)    Received from [facility] SDOH Screening     Has lack of transportation kept you from medical appointments, meetings, work or from getting things needed for daily living? choose all that apply.: Yes, it has kept me from medical appointments or from getting my medications     Has lack of transportation kept you from medical appointments, meetings, work or from getting things needed for daily living? choose all that apply.: Yes, it has kept me from non-medical meetings, appointments, work or from getting things that i need   Physical Activity: Insufficiently Active (10/17/2024)    Received from [place]  [facility] and [facility]    Exercise Vital Sign     Days of Exercise per Week: 5 days     Minutes of Exercise per Session: 20 min   Stress: Patient Declined (10/18/2024)    Received from [facility] and [facility]    [employer] [place] [employer] - Occupational Stress Questionnaire     Feeling of Stress : Patient declined   Housing Stability: Low Risk  (10/17/2024)    Received from [facility] and [facility]    Housing Stability Vital Sign     Unable to Pay for Housing in the Last Year: No     Number of Times Moved in the Last Year: 1     Homeless in the Last Year: No         Subjective      Review of Systems   Constitutional: Negative for appetite change, fatigue and unexpected weight change.   HENT: Negative for mouth sores.   Eyes: Negative for visual disturbance.   Respiratory: Negative for cough and shortness of breath.   Cardiovascular: Negative for chest pain.   Gastrointestinal: Negative for diarrhea.   Genitourinary: Negative for frequency.   Musculoskeletal: Negative for back pain.   Skin: Negative for rash.   Neurological: Negative for headaches.   Hematological: Negative for adenopathy.   Psychiatric/Behavioral: The patient is not nervous/anxious.   All other systems reviewed and are negative.     Objective    Physical Exam   There were no vitals filed for this visit.    Constitutional: patient is oriented to person, place, and time. patient appears well-developed and well-nourished. No distress.   HENT:   Right Ear: External ear normal.   Left Ear: External ear normal.   Nose: Nose normal.   Mouth/Throat: Oropharynx is clear and moist. No oropharyngeal exudate.   Teeth, gums and lips are normal   No sinus tenderness   Palate, tongue, posterior pharynx are normal   Eyes: Conjunctivae and lids are normal.   Neck: Trachea normal and normal range of motion. No thyromegaly   Cardiovascular: Normal rate, regular rhythm, normal heart sounds,  intact distal pulses and normal pulses.   No murmur heard.   No edema, no tenderness in the extremities.   Pulmonary/Chest: Effort normal and breath sounds normal. No accessory muscle usage. patient has no wheezes..   Abdominal: Soft. Normal appearance and bowel sounds are normal. patient exhibits no distension and no mass. There is no hepatosplenomegaly. There is no tenderness.   Musculoskeletal: Normal range of motion.   Gait is normal   No clubbing, cyanosis     Lymphadenopathy:   Head (right side): No submental and no submandibular adenopathy present.   Head (left side): No submental and no submandibular adenopathy present.   patient has no cervical adenopathy.   Right: No supraclavicular adenopathy present.   Left: No supraclavicular adenopathy present.   Neurological: patient is alert and oriented to person, place, and time. patient has normal strength and normal reflexes. No sensory deficit. Gait normal.   Skin: Skin is warm, dry and intact. No bruising, no lesion and no rash noted. No cyanosis. Nails show no clubbing.   No lesions   Psychiatric: patient has a normal mood and affect. patient speech is normal and behavior is normal. Judgment normal. Cognition and memory are normal.   Vitals reviewed.     Lab Results   Component Value Date    WBC 11.68 06/16/2025    HGB 11.4 (L) 06/16/2025    HCT 35.0 (L) 06/16/2025    MCV 85 06/16/2025     (H) 06/16/2025       CMP  Sodium   Date Value Ref Range Status   06/16/2025 131 (L) 136 - 145 mmol/L Final     Potassium   Date Value Ref Range Status   06/16/2025 4.0 3.5 - 5.1 mmol/L Final     Chloride   Date Value Ref Range Status   06/16/2025 96 95 - 110 mmol/L Final     CO2   Date Value Ref Range Status   06/16/2025 27 23 - 29 mmol/L Final     Glucose   Date Value Ref Range Status   06/16/2025 100 70 - 110 mg/dL Final     BUN   Date Value Ref Range Status   06/16/2025 13 8 - 23 mg/dL Final     Creatinine   Date Value Ref Range Status   06/16/2025 0.6 0.5 - 1.4  mg/dL Final     Calcium   Date Value Ref Range Status   06/16/2025 10.4 8.7 - 10.5 mg/dL Final     Total Protein   Date Value Ref Range Status   02/26/2025 7.5 6.0 - 8.4 g/dL Final     Albumin   Date Value Ref Range Status   02/26/2025 4.0 3.5 - 5.2 g/dL Final     Total Bilirubin   Date Value Ref Range Status   02/26/2025 0.4 0.1 - 1.0 mg/dL Final     Comment:     For infants and newborns, interpretation of results should be based  on gestational age, weight and in agreement with clinical  observations.    Premature Infant recommended reference ranges:  Up to 24 hours.............<8.0 mg/dL  Up to 48 hours............<12.0 mg/dL  3-5 days..................<15.0 mg/dL  6-29 days.................<15.0 mg/dL       Alkaline Phosphatase   Date Value Ref Range Status   02/26/2025 109 55 - 135 U/L Final     AST   Date Value Ref Range Status   02/26/2025 15 10 - 40 U/L Final     ALT   Date Value Ref Range Status   02/26/2025 8 (L) 10 - 44 U/L Final     Anion Gap   Date Value Ref Range Status   06/16/2025 8 8 - 16 mmol/L Final     eGFR   Date Value Ref Range Status   06/16/2025 >60 >60 mL/min/1.73/m2 Final   02/26/2025 >60.0 >60 mL/min/1.73 m^2 Final       PEtCT  Impression:     1.  Large centrally necrotic mass in the right lung extending from the hilum into the right lower lobe compatible with malignancy, noting this appears to opacify/obstruct the right mainstem bronchus, with postobstructive atelectasis/consolidation in the right lower lobe.     2.  Mild faint scattered tree-in-bud opacity in the adjacent right lung suggesting a combination of mucous, secretion, aspiration and or small airways disease.     3.  No PET-CT avid distant metastatic disease.      Path bronchial wash  LUNG, RIGHT, BRONCHIAL WASH:     --POSITIVE FOR SQUAMOUS CELL CARCINOMA        Assessment    Squamous cell carcinoma of the right lung.  T4 N1 M0.  Stage III a.  ECOG score 1.  Abnormal weight loss secondary to cancer.  Active tobacco  use.    Nonsurgical candidate    Plan for concurrent chemoradiation involving carbo Taxol paclitaxel weekly with radiation x6 weeks.  This will be followed by consolidation of immunotherapy.      Chemo port placed    Abnormal weight loss secondary to malignancy.  Starting on Megace and referred to nutritionist.    Chemo class    I will see patient prior to start of therapy with blood work for chemo clearance  Plan    Malignant neoplasm of lower lobe of right lung

## 2025-06-24 ENCOUNTER — CLINICAL SUPPORT (OUTPATIENT)
Facility: CLINIC | Age: 65
End: 2025-06-24
Payer: MEDICARE

## 2025-06-24 DIAGNOSIS — Z71.3 NUTRITIONAL COUNSELING: Primary | ICD-10-CM

## 2025-06-24 NOTE — PROGRESS NOTES
Medical Nutrition Therapy Oncology Progress Note      Patient's PCP:Kim Bowser NP    Referring Provider: Nelson Sheets MD  Subjective:       Patient ID: Vlad Gonzales is a 64 y.o. male.    Chief Complaint: Unintentional weight loss related to lung cancer     Assessment:     Nutrition/Diet History     Patient Reported Diet/Restrictions/Preferences: none  Food Allergies: No known food allergies   Factors Affecting Nutritional Intake: Cancer  Nutrition Related Social Determinants of Health: SDOH: Participates in community based programs to support access to food    Diet/PO Recall: breakfast and dinner only   Appetite: low   Fluid Intake: Not adequate fluid intake    Diet Recall:  Breakfast: bowl of cereal with milk   Lunch: none  Dinner: peanut butter sandwich with milk   Snacks: none  Drinks: coffee, tea, water  Supplements: none     Estimated/Assessed Needs     Weight Used For Calorie Calculations: 71 kg (156 lb) IBW   Energy Calorie Requirements (kcal): 9070-3646 kcal/day   Energy Need Method: 30-35 Kcal/kg (COPD + Cancer w/Cachexia)  Protein Requirements:  g/day   Protein Need Method: 1.3-1.5 g/kg (COPD + Cancer w/Cachexia)  Fluid Requirements: 2300 ml/day  Estimated Fluid Requirement Method: 1ml/kcal      Nutrition Support  None at this time.     Evaluation of Received Nutrient/Fluid Intake     Calorie Intake: not meeting needs  Protein Intake: not meeting needs  Fluid Intake: meeting needs  Tolerance: tolerating  % Intake of Estimated Energy Needs: 40-50 %    Nutrition Diagnosis: Severe Malnutrition related to diagnosis of chronic disease cancer and COPD as evidenced by signs of severe muscle wasting(temples, clavicles, scapula, and calf) and severe fat loss (orbital, triceps, fat overlying the ribs, buccal).     Nutrition Diagnosis: Increased nutrient needs related to increased energy expenditure as evidenced by diagnosis of cancer and COPD.     RD  Notes    6/24(initial): Spoke to patient today regarding unintentional weight loss. Patient shows signs of severe malnutrition (severe muscle wasting and fat loss). Patient reported that his appetite is low. He mentioned that his doctor prescribed him Megace appetite stimulant to improve his intake. Patient eats 2 small meals a day and no snacks. Patient does not adequately hydrate throughout the day; we discussed ways to drink more fluids throughout the day. Patient denied nausea, vomiting, diarrhea, constipation. He stated that he just doesn't feel like eating; however, he is now determined to start eating more calories and protein and drinking more fluids. He typically eats quick foods at dinner time; however, he does enjoy cooking if ingredients are available. He is currently signed up for the food pantry. We discussed starting a supplement of Boost or Ensure for his missed meal; he voiced that he would like to give that a try. Gave patient samples of Ensure Complete today to take home. Also, signed up patient to receive Boost supplements along with frozen meals from [employer] to help improve his intake. He stated that he would drink the supplements if more were provided. Recommended that patient start supplementing with at least 1-2 Ensure/Boost supplements and to add in snacks that are a good source of protein, such as greek yogurt, peanut butter, boiled eggs, and chicken salad, to meet higher protein needs. Additionally, we also talked about increasing calories to prevent further weight loss by adding in calorie-dense foods to meals and snacks, such as whole milk, peanut butter, yogurt, granola, icecream, butter/oils, and avocados. Patient will also try to start one calorie-dense smoothie daily. Patient denies having any nutrition related questions or concerns at the current time. RD will continue to monitor any weight changes, appetite, PO intake, and labs. CW: 115#    Nutrition Intervention:       Nutrition Intervention General/healthful diet   Goals/Expected Outcomes Meet % EEN/EPN    Progress Initial     Plan  Recommend start Ensure Complete or Boost 1-2 times daily to better meet calorie and protein needs.   Emphasized increased protein intake to support recovery.   Discussed strategies to increase nutrient density of current intake and high calorie/protein food choices.  Discussed alternate sources of hydration  Provided RD contact info & encouraged pt to call with any questions/concerns.   Will f/u as needed.     Past Medical History:   Diagnosis Date    Coronary artery disease     cardiac stents    Hypertension        Past Surgical History:   Procedure Laterality Date    ENDOBRONCHIAL ULTRASOUND N/A 6/18/2025    Procedure: ENDOBRONCHIAL ULTRASOUND (EBUS);  Surgeon: Luis M Sandoval MD;  Location: [facility];  Service: Pulmonary;  Laterality: N/A;       Social History     Socioeconomic History    Marital status:    Tobacco Use    Smoking status: Every Day     Current packs/day: 2.00     Types: Cigarettes   Substance and Sexual Activity    Alcohol use: Not Currently     Comment: occas     Social Drivers of Health     Financial Resource Strain: High Risk (4/26/2025)    Received from [facility] SDOH Screening     In the past year, have you been unable to get any of the following when you really needed them? choose all that apply.: Internet   Food Insecurity: High Risk (4/26/2025)    Received from [facility] SDOH Screening     In the past 2 months, did you or others you live with eat smaller meals or skip meals because you didn't have money for food?: Yes   Transportation Needs: High Risk (4/26/2025)    Received from [facility] SDOH Screening     Has lack of transportation kept you from medical appointments, meetings, work or from getting things needed for daily living? choose all that apply.: Yes, it has kept me from medical appointments or from  getting my medications     Has lack of transportation kept you from medical appointments, meetings, work or from getting things needed for daily living? choose all that apply.: Yes, it has kept me from non-medical meetings, appointments, work or from getting things that i need   Physical Activity: Insufficiently Active (10/17/2024)    Received from [facility]    Exercise Vital Sign     Days of Exercise per Week: 5 days     Minutes of Exercise per Session: 20 min   Stress: Patient Declined (10/18/2024)    Received from [facility]    [employer] [place] [employer] - Occupational Stress Questionnaire     Feeling of Stress : Patient declined   Housing Stability: Low Risk  (10/17/2024)    Received from [facility]    Housing Stability Vital Sign     Unable to Pay for Housing in the Last Year: No     Number of Times Moved in the Last Year: 1     Homeless in the Last Year: No       Family History   Problem Relation Name Age of Onset    Lung cancer Sister      Cancer Sister      Cancer Paternal Uncle      Skin cancer Paternal Uncle      Cancer Maternal Grandmother      Lung cancer Maternal Grandfather      Cancer Maternal Grandfather         Review of patient's allergies indicates:  No Known Allergies  Current Medications[1]    All medications and past history have been reviewed.    OP NSCLC PACLitaxel CARBOplatin QW + radiation      Treatment Goal:   Control      Status:   Active      Start Date:   6/23/2025 (Planned)      End Date:   8/4/2025 (Planned)      Provider:   Nelson Sheets MD      Chemotherapy:   CARBOplatin (PARAPLATIN) 230 mg in 0.9% NaCl 273 mL chemo infusion, 230 mg, Intravenous, Clinic/HOD 1 time, 0 of 7 cycles        PACLitaxeL (TAXOL) 45 mg/m2 = 72 mg in 0.9% NaCl 250 mL chemo infusion, 45 mg/m2 = 72 mg, Intravenous, Clinic/HOD 1 time, 0 of 7 cycles      Objective:        Wt Readings from  "Last 1 Encounters:   06/23/25 1113 52.3 kg (115 lb 4.8 oz)       Last Labs:  Last Labs:  Glucose   Date Value Ref Range Status   06/16/2025 100 70 - 110 mg/dL Final   02/26/2025 97 70 - 110 mg/dL Final   03/20/2020 81 70 - 110 mg/dL Final     BUN   Date Value Ref Range Status   06/16/2025 13 8 - 23 mg/dL Final   02/26/2025 13 8 - 23 mg/dL Final   03/20/2020 14 6 - 20 mg/dL Final     Creatinine   Date Value Ref Range Status   06/16/2025 0.6 0.5 - 1.4 mg/dL Final   02/26/2025 0.7 0.5 - 1.4 mg/dL Final   03/20/2020 0.9 0.5 - 1.4 mg/dL Final     Sodium   Date Value Ref Range Status   06/16/2025 131 (L) 136 - 145 mmol/L Final   02/26/2025 133 (L) 136 - 145 mmol/L Final   03/20/2020 136 136 - 145 mmol/L Final     Potassium   Date Value Ref Range Status   06/16/2025 4.0 3.5 - 5.1 mmol/L Final   02/26/2025 4.1 3.5 - 5.1 mmol/L Final   03/20/2020 3.9 3.5 - 5.1 mmol/L Final     No results found for: "PHOS"  Calcium   Date Value Ref Range Status   06/16/2025 10.4 8.7 - 10.5 mg/dL Final   02/26/2025 9.4 8.7 - 10.5 mg/dL Final   03/20/2020 9.2 8.7 - 10.5 mg/dL Final     No results found for: "PREALBUMIN"  Total Protein   Date Value Ref Range Status   02/26/2025 7.5 6.0 - 8.4 g/dL Final   03/20/2020 7.0 6.0 - 8.4 g/dL Final     No results found for: "CHOL"  No results found for: "HGBA1C"  Hgb   Date Value Ref Range Status   06/16/2025 11.4 (L) 14.0 - 18.0 gm/dL Final     Hemoglobin   Date Value Ref Range Status   02/26/2025 12.1 (L) 14.0 - 18.0 g/dL Final   03/20/2020 15.4 14.0 - 18.0 g/dL Final     Hematocrit   Date Value Ref Range Status   02/26/2025 38.6 (L) 40.0 - 54.0 % Final   03/20/2020 45.6 40.0 - 54.0 % Final     Hct   Date Value Ref Range Status   06/16/2025 35.0 (L) 40.0 - 54.0 % Final     No results found for: "IRON"  No components found for: "FROLATE"  No results found for: "PONNVCRP49YL"  WBC   Date Value Ref Range Status   06/16/2025 11.68 3.90 - 12.70 K/uL Final   02/26/2025 6.81 3.90 - 12.70 K/uL Final "   03/20/2020 7.31 3.90 - 12.70 K/uL Final       Monitoring/Evaluation:     Monitor: Any weight changes, labs, appetite, and PO intake.     Next Visit: PRN      I have explained and the patient understands all of  the current recommendation(s). I have answered all of their questions to the best of my ability and to their complete satisfaction.   The patient is to continue with the current management plan.    Electronically signed by:   Tania Moss, MS, RD, LDN         [1]   Current Outpatient Medications:     albuterol (PROVENTIL) 2.5 mg /3 mL (0.083 %) nebulizer solution, Take 3 mLs (2.5 mg total) by nebulization every 6 (six) hours as needed for Wheezing or Shortness of Breath. Rescue, Disp: 240 mL, Rfl: 11    albuterol (PROVENTIL/VENTOLIN HFA) 90 mcg/actuation inhaler, Inhale 2 puffs into the lungs., Disp: , Rfl:     aspirin (ECOTRIN) 81 MG EC tablet, Take 1 tablet (81 mg total) by mouth once daily., Disp: , Rfl: 0    budesonide-glycopyr-formoterol (BREZTRI AEROSPHERE) 160-9-4.8 mcg/actuation HFAA, Inhale 2 puffs into the lungs 2 (two) times a day., Disp: 32.1 g, Rfl: 3    lisinopriL (PRINIVIL,ZESTRIL) 20 MG tablet, 20 mg. (Patient not taking: Reported on 6/23/2025), Disp: , Rfl:     megestroL (MEGACE) 40 MG Tab, Take 1 tablet (40 mg total) by mouth 2 (two) times daily., Disp: 60 tablet, Rfl: 11

## 2025-06-25 ENCOUNTER — TELEPHONE (OUTPATIENT)
Facility: CLINIC | Age: 65
End: 2025-06-25
Payer: MEDICARE

## 2025-06-25 DIAGNOSIS — C34.90 MALIGNANT NEOPLASM OF LUNG, UNSPECIFIED LATERALITY, UNSPECIFIED PART OF LUNG: Primary | ICD-10-CM

## 2025-06-26 ENCOUNTER — CLINICAL SUPPORT (OUTPATIENT)
Facility: CLINIC | Age: 65
End: 2025-06-26
Payer: MEDICARE

## 2025-06-26 ENCOUNTER — LAB VISIT (OUTPATIENT)
Dept: LAB | Facility: HOSPITAL | Age: 65
End: 2025-06-26
Attending: INTERNAL MEDICINE
Payer: MEDICARE

## 2025-06-26 VITALS
BODY MASS INDEX: 17.18 KG/M2 | HEART RATE: 107 BPM | WEIGHT: 116 LBS | OXYGEN SATURATION: 92 % | DIASTOLIC BLOOD PRESSURE: 62 MMHG | HEIGHT: 69 IN | SYSTOLIC BLOOD PRESSURE: 102 MMHG | TEMPERATURE: 97 F | RESPIRATION RATE: 18 BRPM

## 2025-06-26 DIAGNOSIS — C34.31 MALIGNANT NEOPLASM OF LOWER LOBE OF RIGHT LUNG: ICD-10-CM

## 2025-06-26 DIAGNOSIS — B37.0 ORAL CANDIDA: ICD-10-CM

## 2025-06-26 DIAGNOSIS — R91.8 LUNG MASS: ICD-10-CM

## 2025-06-26 DIAGNOSIS — C34.90 MALIGNANT NEOPLASM OF LUNG, UNSPECIFIED LATERALITY, UNSPECIFIED PART OF LUNG: ICD-10-CM

## 2025-06-26 DIAGNOSIS — C34.31 MALIGNANT NEOPLASM OF LOWER LOBE OF RIGHT LUNG: Primary | ICD-10-CM

## 2025-06-26 LAB
ABSOLUTE EOSINOPHIL (SMH): 0.22 K/UL
ABSOLUTE MONOCYTE (SMH): 1.24 K/UL (ref 0.3–1)
ABSOLUTE NEUTROPHIL COUNT (SMH): 12.9 K/UL (ref 1.8–7.7)
ALBUMIN SERPL-MCNC: 3 G/DL (ref 3.5–5.2)
ALP SERPL-CCNC: 137 UNIT/L (ref 55–135)
ALT SERPL-CCNC: 17 UNIT/L (ref 10–44)
ANION GAP (SMH): 9 MMOL/L (ref 8–16)
AST SERPL-CCNC: 20 UNIT/L (ref 10–40)
BASOPHILS # BLD AUTO: 0.07 K/UL
BASOPHILS NFR BLD AUTO: 0.5 %
BILIRUB SERPL-MCNC: 0.2 MG/DL (ref 0.1–1)
BUN SERPL-MCNC: 18 MG/DL (ref 8–23)
CALCIUM SERPL-MCNC: 10.2 MG/DL (ref 8.7–10.5)
CHLORIDE SERPL-SCNC: 93 MMOL/L (ref 95–110)
CO2 SERPL-SCNC: 26 MMOL/L (ref 23–29)
CREAT SERPL-MCNC: 0.6 MG/DL (ref 0.5–1.4)
ERYTHROCYTE [DISTWIDTH] IN BLOOD BY AUTOMATED COUNT: 12.7 % (ref 11.5–14.5)
GFR SERPLBLD CREATININE-BSD FMLA CKD-EPI: >60 ML/MIN/1.73/M2
GLUCOSE SERPL-MCNC: 131 MG/DL (ref 70–110)
HCT VFR BLD AUTO: 34.2 % (ref 40–54)
HGB BLD-MCNC: 10.9 GM/DL (ref 14–18)
IMM GRANULOCYTES # BLD AUTO: 0.06 K/UL (ref 0–0.04)
IMM GRANULOCYTES NFR BLD AUTO: 0.4 % (ref 0–0.5)
LYMPHOCYTES # BLD AUTO: 0.78 K/UL (ref 1–4.8)
MAGNESIUM SERPL-MCNC: 1.9 MG/DL (ref 1.6–2.6)
MCH RBC QN AUTO: 26.8 PG (ref 27–31)
MCHC RBC AUTO-ENTMCNC: 31.9 G/DL (ref 32–36)
MCV RBC AUTO: 84 FL (ref 82–98)
NUCLEATED RBC (/100WBC) (SMH): 0 /100 WBC
PLATELET # BLD AUTO: 829 K/UL (ref 150–450)
PMV BLD AUTO: 8.4 FL (ref 9.2–12.9)
POTASSIUM SERPL-SCNC: 4.4 MMOL/L (ref 3.5–5.1)
PROT SERPL-MCNC: 7.7 GM/DL (ref 6–8.4)
RBC # BLD AUTO: 4.07 M/UL (ref 4.6–6.2)
RELATIVE EOSINOPHIL (SMH): 1.4 % (ref 0–8)
RELATIVE LYMPHOCYTE (SMH): 5.1 % (ref 18–48)
RELATIVE MONOCYTE (SMH): 8.1 % (ref 4–15)
RELATIVE NEUTROPHIL (SMH): 84.5 % (ref 38–73)
SODIUM SERPL-SCNC: 128 MMOL/L (ref 136–145)
WBC # BLD AUTO: 15.3 K/UL (ref 3.9–12.7)

## 2025-06-26 PROCEDURE — 80053 COMPREHEN METABOLIC PANEL: CPT

## 2025-06-26 PROCEDURE — 85025 COMPLETE CBC W/AUTO DIFF WBC: CPT

## 2025-06-26 PROCEDURE — 99215 OFFICE O/P EST HI 40 MIN: CPT | Mod: S$GLB,,,

## 2025-06-26 PROCEDURE — 83735 ASSAY OF MAGNESIUM: CPT

## 2025-06-26 PROCEDURE — 99999 PR PBB SHADOW E&M-EST. PATIENT-LVL III: CPT | Mod: PBBFAC,,,

## 2025-06-26 PROCEDURE — 36415 COLL VENOUS BLD VENIPUNCTURE: CPT

## 2025-06-26 PROCEDURE — G2211 COMPLEX E/M VISIT ADD ON: HCPCS | Mod: S$GLB,,,

## 2025-06-26 RX ORDER — PROMETHAZINE HYDROCHLORIDE 25 MG/1
25 TABLET ORAL
Qty: 30 TABLET | Refills: 2 | Status: SHIPPED | OUTPATIENT
Start: 2025-06-26

## 2025-06-26 RX ORDER — ONDANSETRON 8 MG/1
8 TABLET, FILM COATED ORAL EVERY 8 HOURS PRN
Qty: 30 TABLET | Refills: 2 | Status: SHIPPED | OUTPATIENT
Start: 2025-06-26 | End: 2026-06-26

## 2025-06-26 RX ORDER — NYSTATIN 100000 [USP'U]/ML
4 SUSPENSION ORAL 4 TIMES DAILY
Qty: 112 ML | Refills: 0 | Status: SHIPPED | OUTPATIENT
Start: 2025-06-26 | End: 2025-07-03

## 2025-06-26 NOTE — PROGRESS NOTES
[facility] HEMATOLGY ONCOLOGY     Subjective:       Patient ID: Vlad Gonzales is a 64 y.o. male presents today for chemotherapy education.      Chief Complaint: Chemo education      HPI    Pt here today to receive education on the chemo regimen Paclitaxel Carboplatin. Plan per Dr Sheets is for him to receive chemo weekly with concurrent radiation x 6 weeks.    He is scheduled for port placement on Monday 6/30.  Due to start treatment next week   Will have chemo clearance appt with Dr Sheets tomorrow 6/27/2025      Oncology History   Malignant neoplasm of lower lobe of right lung   6/20/2025 Initial Diagnosis    Malignant neoplasm of lower lobe of right lung     6/20/2025 Cancer Staged    Staging form: Lung, AJCC V9  - Clinical stage from 6/20/2025: Stage IIIA (cT4, cN1, cM0)     6/23/2025 -  Chemotherapy    Treatment Summary   Plan Name: OP NSCLC PACLitaxel CARBOplatin QW + radiation  Treatment Goal: Control  Status: Active  Start Date: 6/23/2025 (Planned)  End Date: 8/4/2025 (Planned)  Provider: Nelson Sheets MD  Chemotherapy: CARBOplatin (PARAPLATIN) 230 mg in 0.9% NaCl 273 mL chemo infusion, 230 mg, Intravenous, Clinic/HOD 1 time, 0 of 7 cycles  PACLitaxeL (TAXOL) 45 mg/m2 = 72 mg in 0.9% NaCl 250 mL chemo infusion, 45 mg/m2 = 72 mg, Intravenous, Clinic/HOD 1 time, 0 of 7 cycles         Past Medical History:   Diagnosis Date    Coronary artery disease     cardiac stents    Hypertension        Past Surgical History:   Procedure Laterality Date    ENDOBRONCHIAL ULTRASOUND N/A 6/18/2025    Procedure: ENDOBRONCHIAL ULTRASOUND (EBUS);  Surgeon: Luis M Sandoval MD;  Location: [facility];  Service: Pulmonary;  Laterality: N/A;       Social History     Socioeconomic History    Marital status:    Tobacco Use    Smoking status: Every Day     Current packs/day: 2.00     Types: Cigarettes    Tobacco comments:     Smoking on oxygen    Substance and Sexual Activity    Alcohol use: Not Currently     Comment: occas     Social  Drivers of Health     Financial Resource Strain: High Risk (4/26/2025)    Received from [facility] SDOH Screening     In the past year, have you been unable to get any of the following when you really needed them? choose all that apply.: Internet   Food Insecurity: High Risk (4/26/2025)    Received from [facility] SDOH Screening     In the past 2 months, did you or others you live with eat smaller meals or skip meals because you didn't have money for food?: Yes   Transportation Needs: High Risk (4/26/2025)    Received from [facility] SDOH Screening     Has lack of transportation kept you from medical appointments, meetings, work or from getting things needed for daily living? choose all that apply.: Yes, it has kept me from medical appointments or from getting my medications     Has lack of transportation kept you from medical appointments, meetings, work or from getting things needed for daily living? choose all that apply.: Yes, it has kept me from non-medical meetings, appointments, work or from getting things that i need   Physical Activity: Insufficiently Active (10/17/2024)    Received from [facility]    Exercise Vital Sign     Days of Exercise per Week: 5 days     Minutes of Exercise per Session: 20 min   Stress: Patient Declined (10/18/2024)    Received from [facility]    [employer] [place] [employer] - Occupational Stress Questionnaire     Feeling of Stress : Patient declined   Housing Stability: Low Risk  (10/17/2024)    Received from [facility]    Housing Stability Vital Sign     Unable to Pay for Housing in the Last Year: No     Number of Times Moved in the Last Year: 1     Homeless in the Last Year: No       Family History   Problem Relation Name Age of Onset    Lung cancer Sister      Cancer Sister      Cancer Paternal Uncle      Skin cancer  "Paternal Uncle      Cancer Maternal Grandmother      Lung cancer Maternal Grandfather      Cancer Maternal Grandfather         Review of patient's allergies indicates:  No Known Allergies    Current Medications[1]    All medications and past history have been reviewed.    Review of Systems   Constitutional:  Positive for activity change, appetite change, fatigue and unexpected weight change.   HENT:  Positive for congestion and mouth sores.    Eyes: Negative.    Respiratory:  Positive for shortness of breath.         Intermitted     Cardiovascular: Negative.    Gastrointestinal: Negative.    Endocrine: Negative.    Genitourinary: Negative.    Musculoskeletal:  Positive for arthralgias and back pain.   Skin: Negative.    Allergic/Immunologic: Negative.    Neurological: Negative.    Hematological: Negative.    Psychiatric/Behavioral: Negative.         Objective:        Physcial Examination  VITAL SIGNS:    Body surface area is 1.6 meters squared.   Pain Assessment  Vitals:    06/26/25 1253   BP: 102/62   Pulse: 107   Resp: 18   Temp: 97.3 °F (36.3 °C)   TempSrc: Temporal   SpO2: (!) 92%   Weight: 52.6 kg (116 lb)   Height: 5' 9" (1.753 m)   PainSc:   2   PainLoc: Chest          Wt Readings from Last 5 Encounters:   06/26/25 52.6 kg (116 lb)   06/23/25 52.3 kg (115 lb 4.8 oz)   06/20/25 54 kg (119 lb)   06/16/25 54 kg (119 lb)   06/12/25 54.4 kg (119 lb 14.9 oz)       Physical Exam  Constitutional:       Appearance: Normal appearance. He is ill-appearing.   HENT:      Head: Normocephalic.      Nose: Rhinorrhea present.      Mouth/Throat:      Comments: White patches to posterior tongue   Cardiovascular:      Rate and Rhythm: Tachycardia present.   Pulmonary:      Comments: Intermitted coughing spells and SOB on exertion   Abdominal:      General: Abdomen is flat.   Musculoskeletal:         General: Normal range of motion.      Cervical back: Normal range of motion.      Comments: In a wheelchair due to shortness of " breath on exertion     Skin:     General: Skin is warm and dry.      Capillary Refill: Capillary refill takes 2 to 3 seconds.   Neurological:      General: No focal deficit present.      Mental Status: He is alert and oriented to person, place, and time.   Psychiatric:         Mood and Affect: Mood normal.         Behavior: Behavior normal.              Laboratory and Radiology   Lab Results   Component Value Date    WBC 15.30 (H) 06/26/2025    RBC 4.07 (L) 06/26/2025    HGB 10.9 (L) 06/26/2025    HCT 34.2 (L) 06/26/2025    MCV 84 06/26/2025    MCH 26.8 (L) 06/26/2025    MCHC 31.9 (L) 06/26/2025    RDW 12.7 06/26/2025     (H) 06/26/2025    MPV 8.4 (L) 06/26/2025    GRAN 4.3 02/26/2025    GRAN 63.8 02/26/2025    LYMPH 1.4 02/26/2025    LYMPH 20.0 02/26/2025    MONO 0.8 02/26/2025    MONO 11.3 02/26/2025    EOS 0.3 02/26/2025    BASO 0.07 02/26/2025    EOSINOPHIL 3.8 02/26/2025    BASOPHIL 1.0 02/26/2025     BMP  Lab Results   Component Value Date     (L) 06/16/2025    K 4.0 06/16/2025    CL 96 06/16/2025    CO2 27 06/16/2025    BUN 13 06/16/2025    CREATININE 0.6 06/16/2025    CALCIUM 10.4 06/16/2025    ANIONGAP 8 06/16/2025    ESTGFRAFRICA >60.0 03/20/2020    EGFRNONAA >60.0 03/20/2020     Lab Results   Component Value Date    ALT 8 (L) 02/26/2025    AST 15 02/26/2025    ALKPHOS 109 02/26/2025    BILITOT 0.4 02/26/2025     No results found for this or any previous visit (from the past 2160 hours).  Results for orders placed or performed during the hospital encounter of 06/03/25 (from the past 2160 hours)   NM PET CT FDG Skull Base to Mid Thigh    Impression    1.  Large centrally necrotic mass in the right lung extending from the hilum into the right lower lobe compatible with malignancy, noting this appears to opacify/obstruct the right mainstem bronchus, with postobstructive atelectasis/consolidation in the right lower lobe.    2.  Mild faint scattered tree-in-bud opacity in the adjacent right lung  suggesting a combination of mucous, secretion, aspiration and or small airways disease.    3.  No PET-CT avid distant metastatic disease.    .      Electronically signed by: Alfredo Benz  Date:    06/03/2025  Time:    13:50     Results for orders placed or performed during the hospital encounter of 06/22/25 (from the past 2160 hours)   MRI BRAIN W WO CONTRAST    Impression    No acute intracranial abnormality observed.      Electronically signed by: Trino Luu  Date:    06/22/2025  Time:    12:18       Pathology  Pathology Results  (Last 10 years)      None            All lab results and imaging results have been reviewed and discussed with the patient.        TITLE: PLAN OF CARE FOR THE CHEMOTHERAPY PATIENT / TEACHING PROTOCOL    PURPOSE: To involve the patient / significant other in the plan of care and to provide teaching to the significant other & patient receiving chemotherapy.    LEVEL: Independent.    CONTENT: The Plan of Care for the chemotherapy patient is individualized and appropriate to the patients needs, strengths, limitations, & goals.  Education includes information regarding chemotherapy side effects, the treatment itself, and self-care  Activities.    GOAL / OUTCOME STANDARDS    PHYSIOLOGIC: The client will remain free or experience minimal side effects or toxicities throughout the chemotherapy treatment period.     PSYCHOLOGIC: The client/significant others will demonstrate positive coping mechanisms in relation to chemotherapy and its side effects.      COGINITIVE: The client/significant others will verbalize understanding of self-care measure to avoid/minimize side effects of the chemotherapy regimen.    EVALUATION / COMMENT KEY:    V = Audiovisual/Video  S = Successfully meets outcome  N = Needs further instruction  NA = Not applicable to the patient  P = Previous knowledge  U = Unable to comprehend  * = See progress notes      PLAN OF CARE  INFORMATION TO BE DELIVERED / NURSING  INTERVENTIONS DATE EVALUATION   Assessment of client/caregiver,         knowledge of cancer diagnosis,         and chemotherapy as a treatment. 1a. Evaluate patient/caregiver learning ability    b. Plan teaching sessions with patient/caregiver according to needs and present anxiety level/ability to learn.    c. Provide Chemotherapy Education Packet,        Mouth Care Protocol,         Specific Patient Education Sheets. 06/26/2025 S   Individual chemotherapy treatment         plan. 2a. Review of Chemotherapy Education handout from [facility]            06/26/2025   S   Knowledge Deficit & Self-Management of general side effects common to all chemotherapy:  Nausea/Vomiting  b.   Diarrhea  Mouth Care  Dental care  Constipation  Hair Loss  Potential for infection  Fatigue   3a. Reinforce that the majority of side effects from chemotherapy are reversible and are  controlled both in the hospital and at home        (blood counts recover, hair grows back).   b.  Refer to the following for reinforcement of         information post-treatment:  Mouth Care Protocol.  Bowel Protocol for constipation or diarrhea.  3.  Drug Specific Chemotherapy Information Sheets for each medication patient receiving.    06/26/2025     S     PLAN OF CARE  INFORMATION TO BE DELIVERED / NURSING INTERVENTIONS DATE EVALUATION   h. Potential for bleeding         i. Potential anemia/fatigue         j. Potential sunburn         k. Birth control measures  l. Safety measures post treatment 4.  Chemotherapy Home Care Instruction  and Safety Information Sheet.  A. patient/caregivers to thoroughly cook shellfish (shrimp, crab, etc) to decrease the chance of infection.    B.  Use sunscreen and protective clothing while in the sun.   06/26/2025      Knowledge deficit & Self Management of Drug Specific  Side Effects.    BLADDER EFFECTS        (Hemorrhagic Cystitis)                  Preventable with adequate hydration; occurs 2-3 days or more post treatment.    1.  Instruct patient to:  a.   Void at least every 2 hours; increase intake.  b.   DO NOT hold urine; go when urge is felt.  c.    Empty bladder at bedtime and on         awakening.  d.   Observe for color changes (red to tea           colored), amount and frequency changes.  e.   Notify oncologist of any abnormalities           in urine or voiding or if you cannot               drink adequate fluids.   06/26/2025   S   b.   CHANGES IN URINE   COLOR:      1.   Instruct patient:  a.   Most evident in first 2-3 voidings after           administration.  Lasts less than 24 hours.  If urine is discolored 2 or more days post- treatment, notify oncologist.      06/26/2025 S   c.    KIDNEY EFFECTS           (Nephrotoxicity)   1.  Instruct patient to:  a.   Drink 8-16 glasses of fluid/day the day   pre-treatment and 3-4 days post-treatment to maintain hydration; the best way to minimize kidney problems.  b.   Notify oncologist immediately if unable to drink fluids or if changes are noted in urinary elimination.     06/26/2025   S   PULMONARY TOXICITY    Instruct patient to report symptoms such as shortness of breath, chest pain, shallow breathing, or chest wall discomfort to physician.  Reinforce preventative measures used by the health care team.  Baseline and periodic PFT and chest x-ray.   06/26/2025   S     PLAN OF CARE INFORMATION TO BE DELIVERED / NURSING INTERVENTIONS DATE EVALUATION   NERVE & MUSCLE EFFECTS (neurotoxocity; neuropathy, possible visual/hearing changes)        Instruct patient to:    Report numbness or tingling of the hands/feet, loss of fine motor movement (buttoning shirt, tying shoelaces), or gait changes to your oncologist.  If numbness/tingling are present:  protect feet with shoes at all times.  Use gloves for washing dishes/gardening & potholders in kitchen.       06/26/2025   S   CARDIOTOXICITY  Decreased effectiveness of             cardiac function. Effective are                  cumulative  and irreversible.                                    CARDIAC ARRYTHMIAS              4   Instruct:  Heart function may be tested before treatment and perdiocally during treatment.  Notify oncologist of irregular pulse, palpitations, shortness of breath, or swelling in lower extremities/feet.          Taxol and Taxotere can cause arrhythmias on infusion that resolve once infusion discontinued. Instruct nurse if any irregularity felt.    06/26/2025   S   EXTRAVASTION  Occurs when vesicants leak outside of vein and cause damage to the skin and underlying tissues.   Reinforce preventive measures used to avoid complications.  Fresh IV site or central line monitored continuously with vesicant IVP.  Continuous infusion via central line site and blood return monitored periodically around the clock.  Instruct to:  Notify nurse of any discomfort, burning, stinging, etc. at IV site during chemotherapy administration.  Notify oncologist of any redness, pain, or swelling at IV site after discharge from hospital.   06/26/2025   S   HYPERSENSITIVITY can happen with any medication.   Instruct patient:  Nurse is with them during the initial part of treatment and will be close by to monitor.  Pre-medication ordered by the oncologist must be taken on time. If doses are missed, treatment will need to be re-scheduled.  Skin redness, itching, or hives appearing after discharge should be reported to oncologist. 06/26/2025   S       PLAN OF CARE INFORMATION TO BE DELIVERED / NURSING INTERVENTIONS DATE EVALUATION   FLU-LIKE SYNDROME      Instruct patient symptoms are hard to prevent and may include fever, shaking chills, muscle and body aches.  Taking prescribed medications from physician if needed.  Adequate fluids are important.    Reinforce the need to call if temperature is         elevated to 100.4 or more  06/26/2025   S   HAND-FOOT SYNDROME  causes painful, symmetric swelling and redness of palms and soles                  Instruct  patient to report any numbness or tingling in the hands or feet.  Explain prevention techniques, such as     Use heavy moisturizers to lessen skin dryness and itching, but to avoid if skin is cracked or broken  Bathe in tepid water, use non-perfumed soap, and wash gently. Baths with oatmeal or diluted baking soda may be soothing.  Avoid tight fitting shoes and repetitive actions, such as rubbing hands or applying pressure to hands/feet.  Review measures to take should syndrome occur:  Cold compresses and elevation for          edema  Pain medications and other measures as ordered by oncologist.   4.   Syndrome resolves few weeks after therapy. 06/26/2025   S   5. DISCHARGE PLANNING /        EDUCATION 1.    Explain importance of compliance with follow- up  tests (CBC, CMP).  2.    Verify patient/caregiver know:  a.    Oncologists office phone number.  b.    Dates of follow-up appointments.  c.    Prescriptions given for nausea  3.   Review side effects to monitor and notify          oncologist about.  4.   Reinforce the need for patient and caregivers to:  a.    Review information given.  b.    Call oncologists office with questions  or symptoms  5.   Provide [facility] [place] Brochure make referrals if needed for financial or .   06/26/2025   S     PROGRESS NOTES: I met with the patient Vlad Gonzales today for chemotherapy education. he will be starting treatment with Paclitaxel Carboplatin.. We discussed the mechanism of action, potential side effects of this treatment as well as ways he can manage them at home. Some of these side effects include but or not limited to fever, nausea, vomiting, decreased appetite, fatigue, weakness, cytopenias, myalgia/arthralgia, constipation, diarrhea, bleeding, headache, shortness of breath, nail changes, taste change, hair thinning/loss, mood disturbances, or edema. We also discussed dietary modifications he should make although this will be discussed in  more detail with the dietician. he was provided with anti-emetic medication, a copy of all of the information we discussed today as well as our contact information. he will be provided a schedule on his first day of treatment. We will obtain labs on a weekly basis and the patient will follow-up with the physician for toxicity monitoring throughout treatment. All questions were answered and an informed consent was obtained. he was reminded to certainly contact us sooner if needed.  Attached to the patients folder and discussed with the patient the 24 hour/ 7 days a week after hours telephone number for the physician.  Patient notified to call anytime 24/7 because their is a physician on call for any problems that may arise.  Patient also notified to report to [facility] if they can not get in touch with a physician after hours.  Discussed the five wishes booklet with the patient and their family.           Assessment/Plan:   1. Malignant neoplasm of lower lobe of right lung  Assessment & Plan:  Patient here today to receive education on the chemotherapy regimen Paclitaxel Carboplatin  He is due fpr port placement next Monday 6/30 with a chemo / rads start date of 7/1/25  He will fu with Dr Sheets tomorrow 6/27  Weekly CBC, CMP, Mag labs ordered for revie  PRN Zofran, Phenergan RX sent to pharmacy  All risk and potential side effects discussed   All questions and concerns answered  Patient verbalizes understanding     Orders:  -     Ambulatory referral/consult to [facility]  -     Comprehensive Metabolic Panel; Standing  -     CBC Auto Differential; Standing  -     Magnesium; Standing  -     ondansetron (ZOFRAN) 8 MG tablet; Take 1 tablet (8 mg total) by mouth every 8 (eight) hours as needed for Nausea.  Dispense: 30 tablet; Refill: 2  -     promethazine (PHENERGAN) 25 MG tablet; Take 1 tablet (25 mg total) by mouth every 4 to 6 hours as needed for Nausea.  Dispense: 30 tablet; Refill: 2    2. Oral  candida  Assessment & Plan:  White patches of candida noted to postior tongue.  Nystatin QID x 7 days provided for the treatment of thrush    Orders:  -     nystatin (MYCOSTATIN) 100,000 unit/mL suspension; Take 4 mLs (400,000 Units total) by mouth 4 (four) times daily. for 7 days  Dispense: 112 mL; Refill: 0        FU with Dr. Sheets for Chemo clearance 6/27/25    I have explained and the patient understands all of  the current recommendation(s). I have answered all of their questions to the best of my ability and to their complete satisfaction.   The patient is to continue with the current management plan.        Medications Ordered:  Zofran 8mg 1 tab PO Q8h prn nausea  Phenergan 25mg PO Q4-6h prn nausea      Standing Labs Ordered:  CBC weekly  CMP weekly  Mag weekly      Total Face to Face Time: 60 minutes face to face with the patient and their family discussing the chemotherapy side-effects and when to call our office. Electronically signed by:  Talya Vitale, MSN, APRN FNP-C                 [1]   Current Outpatient Medications:     albuterol (PROVENTIL) 2.5 mg /3 mL (0.083 %) nebulizer solution, Take 3 mLs (2.5 mg total) by nebulization every 6 (six) hours as needed for Wheezing or Shortness of Breath. Rescue, Disp: 240 mL, Rfl: 11    albuterol (PROVENTIL/VENTOLIN HFA) 90 mcg/actuation inhaler, Inhale 2 puffs into the lungs., Disp: , Rfl:     aspirin (ECOTRIN) 81 MG EC tablet, Take 1 tablet (81 mg total) by mouth once daily., Disp: , Rfl: 0    budesonide-glycopyr-formoterol (BREZTRI AEROSPHERE) 160-9-4.8 mcg/actuation HFAA, Inhale 2 puffs into the lungs 2 (two) times a day., Disp: 32.1 g, Rfl: 3    megestroL (MEGACE) 40 MG Tab, Take 1 tablet (40 mg total) by mouth 2 (two) times daily., Disp: 60 tablet, Rfl: 11    lisinopriL (PRINIVIL,ZESTRIL) 20 MG tablet, 20 mg. (Patient not taking: Reported on 6/12/2025), Disp: , Rfl:     nystatin (MYCOSTATIN) 100,000 unit/mL suspension, Take 4 mLs (400,000 Units total) by  mouth 4 (four) times daily. for 7 days, Disp: 112 mL, Rfl: 0    ondansetron (ZOFRAN) 8 MG tablet, Take 1 tablet (8 mg total) by mouth every 8 (eight) hours as needed for Nausea., Disp: 30 tablet, Rfl: 2    promethazine (PHENERGAN) 25 MG tablet, Take 1 tablet (25 mg total) by mouth every 4 to 6 hours as needed for Nausea., Disp: 30 tablet, Rfl: 2

## 2025-06-26 NOTE — ASSESSMENT & PLAN NOTE
White patches of candida noted to postior tongue.  Nystatin QID x 7 days provided for the treatment of thrush

## 2025-06-26 NOTE — ASSESSMENT & PLAN NOTE
Patient here today to receive education on the chemotherapy regimen Paclitaxel Carboplatin  He is due fpr port placement next Monday 6/30 with a chemo / rads start date of 7/1/25  He will fu with Dr Sheets tomorrow 6/27  Weekly CBC, CMP, Mag labs ordered for revie  PRN Zofran, Phenergan RX sent to pharmacy  All risk and potential side effects discussed   All questions and concerns answered  Patient verbalizes understanding    36M unk PMH, BIBEMS for alcohol intoxication. per EMS pt fell to the ground after getting up out of a cab. unable to walk per . no vomiting.

## 2025-06-27 ENCOUNTER — INFUSION (OUTPATIENT)
Dept: INFUSION THERAPY | Facility: HOSPITAL | Age: 65
End: 2025-06-27
Attending: INTERNAL MEDICINE
Payer: MEDICARE

## 2025-06-27 ENCOUNTER — OFFICE VISIT (OUTPATIENT)
Dept: HEMATOLOGY/ONCOLOGY | Facility: CLINIC | Age: 65
End: 2025-06-27
Payer: MEDICARE

## 2025-06-27 VITALS
DIASTOLIC BLOOD PRESSURE: 62 MMHG | SYSTOLIC BLOOD PRESSURE: 102 MMHG | HEART RATE: 74 BPM | BODY MASS INDEX: 17.57 KG/M2 | WEIGHT: 118.63 LBS | RESPIRATION RATE: 18 BRPM | HEIGHT: 69 IN | TEMPERATURE: 98 F

## 2025-06-27 VITALS
TEMPERATURE: 98 F | DIASTOLIC BLOOD PRESSURE: 64 MMHG | BODY MASS INDEX: 17.57 KG/M2 | HEIGHT: 69 IN | RESPIRATION RATE: 16 BRPM | OXYGEN SATURATION: 95 % | WEIGHT: 118.63 LBS | SYSTOLIC BLOOD PRESSURE: 111 MMHG | HEART RATE: 92 BPM

## 2025-06-27 DIAGNOSIS — R63.4 LOSS OF WEIGHT: ICD-10-CM

## 2025-06-27 DIAGNOSIS — E86.0 DEHYDRATION: Primary | ICD-10-CM

## 2025-06-27 DIAGNOSIS — F43.9 STRESS: ICD-10-CM

## 2025-06-27 DIAGNOSIS — E86.0 DEHYDRATION: ICD-10-CM

## 2025-06-27 DIAGNOSIS — C34.31 MALIGNANT NEOPLASM OF LOWER LOBE OF RIGHT LUNG: Primary | ICD-10-CM

## 2025-06-27 DIAGNOSIS — C34.90 MALIGNANT NEOPLASM OF LUNG, UNSPECIFIED LATERALITY, UNSPECIFIED PART OF LUNG: ICD-10-CM

## 2025-06-27 DIAGNOSIS — Z71.3 NUTRITIONAL COUNSELING: ICD-10-CM

## 2025-06-27 PROCEDURE — 96361 HYDRATE IV INFUSION ADD-ON: CPT

## 2025-06-27 PROCEDURE — 96360 HYDRATION IV INFUSION INIT: CPT

## 2025-06-27 PROCEDURE — 25000003 PHARM REV CODE 250: Performed by: INTERNAL MEDICINE

## 2025-06-27 PROCEDURE — 99999 PR PBB SHADOW E&M-EST. PATIENT-LVL III: CPT | Mod: PBBFAC,,, | Performed by: INTERNAL MEDICINE

## 2025-06-27 RX ORDER — SODIUM CHLORIDE 0.9 % (FLUSH) 0.9 %
10 SYRINGE (ML) INJECTION
Status: CANCELLED | OUTPATIENT
Start: 2025-06-27

## 2025-06-27 RX ORDER — FAMOTIDINE 10 MG/ML
20 INJECTION, SOLUTION INTRAVENOUS
OUTPATIENT
Start: 2025-06-27

## 2025-06-27 RX ORDER — EPINEPHRINE 0.3 MG/.3ML
0.3 INJECTION SUBCUTANEOUS ONCE AS NEEDED
OUTPATIENT
Start: 2025-06-27

## 2025-06-27 RX ORDER — SODIUM CHLORIDE 0.9 % (FLUSH) 0.9 %
10 SYRINGE (ML) INJECTION
OUTPATIENT
Start: 2025-06-27

## 2025-06-27 RX ORDER — PROCHLORPERAZINE EDISYLATE 5 MG/ML
5 INJECTION INTRAMUSCULAR; INTRAVENOUS ONCE AS NEEDED
OUTPATIENT
Start: 2025-06-27

## 2025-06-27 RX ORDER — DIPHENHYDRAMINE HYDROCHLORIDE 50 MG/ML
50 INJECTION, SOLUTION INTRAMUSCULAR; INTRAVENOUS ONCE AS NEEDED
OUTPATIENT
Start: 2025-06-27

## 2025-06-27 RX ORDER — SODIUM CHLORIDE 1 G/1
1000 TABLET ORAL 2 TIMES DAILY
Qty: 60 TABLET | Refills: 0 | Status: SHIPPED | OUTPATIENT
Start: 2025-06-27 | End: 2025-07-27

## 2025-06-27 RX ORDER — HEPARIN 100 UNIT/ML
500 SYRINGE INTRAVENOUS
Status: CANCELLED | OUTPATIENT
Start: 2025-06-27

## 2025-06-27 RX ORDER — HEPARIN 100 UNIT/ML
500 SYRINGE INTRAVENOUS
OUTPATIENT
Start: 2025-06-27

## 2025-06-27 RX ADMIN — SODIUM CHLORIDE 1000 ML: 9 INJECTION, SOLUTION INTRAVENOUS at 11:06

## 2025-06-27 NOTE — PLAN OF CARE
Problem: Fatigue  Goal: Improved Activity Tolerance  6/27/2025 1134 by Benita Geiger, RN  Outcome: Met  6/27/2025 1134 by Benita Geiger, RN  Outcome: Progressing

## 2025-06-27 NOTE — PROGRESS NOTES
HPI    64 years old male newly discovered squamous cell carcinoma of the right lung.  Active smoker.  PET scan shows no distant metastases.  MRI of the brain is negative for malignancy involvement.  Clinical staging T4 N1 M0.  Patient's saw Radiation Oncology already.  He is not a surgical candidate.  Planning on concurrent chemoradiation x6 weeks followed by immunotherapy consolidation.    Abnormal weight loss.      Past Medical History:   Diagnosis Date    Coronary artery disease     cardiac stents    Hypertension      Social History     Socioeconomic History    Marital status:    Tobacco Use    Smoking status: Every Day     Current packs/day: 2.00     Types: Cigarettes    Tobacco comments:     Smoking on oxygen    Substance and Sexual Activity    Alcohol use: Not Currently     Comment: occas     Social Drivers of Health     Financial Resource Strain: High Risk (4/26/2025)    Received from [facility] SDOH Screening     In the past year, have you been unable to get any of the following when you really needed them? choose all that apply.: Internet   Food Insecurity: High Risk (4/26/2025)    Received from [facility] SDOH Screening     In the past 2 months, did you or others you live with eat smaller meals or skip meals because you didn't have money for food?: Yes   Transportation Needs: High Risk (4/26/2025)    Received from [facility] SDOH Screening     Has lack of transportation kept you from medical appointments, meetings, work or from getting things needed for daily living? choose all that apply.: Yes, it has kept me from medical appointments or from getting my medications     Has lack of transportation kept you from medical appointments, meetings, work or from getting things needed for daily living? choose all that apply.: Yes, it has kept me from non-medical meetings, appointments, work or from getting things that i need   Physical Activity: Insufficiently  Active (10/17/2024)    Received from [facility]    Exercise Vital Sign     Days of Exercise per Week: 5 days     Minutes of Exercise per Session: 20 min   Stress: Patient Declined (10/18/2024)    Received from [facility]    [employer] - Occupational Stress Questionnaire     Feeling of Stress : Patient declined   Housing Stability: Low Risk  (10/17/2024)    Received from [facility]    Housing Stability Vital Sign     Unable to Pay for Housing in the Last Year: No     Number of Times Moved in the Last Year: 1     Homeless in the Last Year: No         Subjective      Review of Systems   Constitutional: Negative for appetite change, fatigue and unexpected weight change.   HENT: Negative for mouth sores.   Eyes: Negative for visual disturbance.   Respiratory: Negative for cough and shortness of breath.   Cardiovascular: Negative for chest pain.   Gastrointestinal: Negative for diarrhea.   Genitourinary: Negative for frequency.   Musculoskeletal: Negative for back pain.   Skin: Negative for rash.   Neurological: Negative for headaches.   Hematological: Negative for adenopathy.   Psychiatric/Behavioral: The patient is not nervous/anxious.   All other systems reviewed and are negative.     Objective    Physical Exam   Vitals:    06/27/25 1010   BP: 111/64   Pulse: 92   Resp: 16   Temp: 97.6 °F (36.4 °C)       Constitutional: patient is oriented to person, place, and time. patient appears well-developed and well-nourished. No distress.   HENT:   Right Ear: External ear normal.   Left Ear: External ear normal.   Nose: Nose normal.   Mouth/Throat: Oropharynx is clear and moist. No oropharyngeal exudate.   Teeth, gums and lips are normal   No sinus tenderness   Palate, tongue, posterior pharynx are normal   Eyes: Conjunctivae and lids are normal.   Neck: Trachea normal and normal range of  motion. No thyromegaly   Cardiovascular: Normal rate, regular rhythm, normal heart sounds, intact distal pulses and normal pulses.   No murmur heard.   No edema, no tenderness in the extremities.   Pulmonary/Chest: Effort normal and breath sounds normal. No accessory muscle usage. patient has no wheezes..   Abdominal: Soft. Normal appearance and bowel sounds are normal. patient exhibits no distension and no mass. There is no hepatosplenomegaly. There is no tenderness.   Musculoskeletal: Normal range of motion.   Gait is normal   No clubbing, cyanosis     Lymphadenopathy:   Head (right side): No submental and no submandibular adenopathy present.   Head (left side): No submental and no submandibular adenopathy present.   patient has no cervical adenopathy.   Right: No supraclavicular adenopathy present.   Left: No supraclavicular adenopathy present.   Neurological: patient is alert and oriented to person, place, and time. patient has normal strength and normal reflexes. No sensory deficit. Gait normal.   Skin: Skin is warm, dry and intact. No bruising, no lesion and no rash noted. No cyanosis. Nails show no clubbing.   No lesions   Psychiatric: patient has a normal mood and affect. patient speech is normal and behavior is normal. Judgment normal. Cognition and memory are normal.   Vitals reviewed.     Lab Results   Component Value Date    WBC 15.30 (H) 06/26/2025    HGB 10.9 (L) 06/26/2025    HCT 34.2 (L) 06/26/2025    MCV 84 06/26/2025     (H) 06/26/2025       CMP  Sodium   Date Value Ref Range Status   06/26/2025 128 (L) 136 - 145 mmol/L Final     Potassium   Date Value Ref Range Status   06/26/2025 4.4 3.5 - 5.1 mmol/L Final     Chloride   Date Value Ref Range Status   06/26/2025 93 (L) 95 - 110 mmol/L Final     CO2   Date Value Ref Range Status   06/26/2025 26 23 - 29 mmol/L Final     Glucose   Date Value Ref Range Status   06/26/2025 131 (H) 70 - 110 mg/dL Final     BUN   Date Value Ref Range Status    06/26/2025 18 8 - 23 mg/dL Final     Creatinine   Date Value Ref Range Status   06/26/2025 0.6 0.5 - 1.4 mg/dL Final     Calcium   Date Value Ref Range Status   06/26/2025 10.2 8.7 - 10.5 mg/dL Final     Protein Total   Date Value Ref Range Status   06/26/2025 7.7 6.0 - 8.4 gm/dL Final     Albumin   Date Value Ref Range Status   06/26/2025 3.0 (L) 3.5 - 5.2 g/dL Final     Bilirubin Total   Date Value Ref Range Status   06/26/2025 0.2 0.1 - 1.0 mg/dL Final     Comment:     For infants and newborns, interpretation of results should be based   on gestational age, weight and in agreement with clinical   observations.    Premature Infant recommended reference ranges:   0-24 hours:  <8.0 mg/dL   24-48 hours: <12.0 mg/dL   3-5 days:    <15.0 mg/dL   6-29 days:   <15.0 mg/dL     ALP   Date Value Ref Range Status   06/26/2025 137 (H) 55 - 135 unit/L Final     AST   Date Value Ref Range Status   06/26/2025 20 10 - 40 unit/L Final     ALT   Date Value Ref Range Status   06/26/2025 17 10 - 44 unit/L Final     Anion Gap   Date Value Ref Range Status   06/26/2025 9 8 - 16 mmol/L Final     eGFR   Date Value Ref Range Status   06/26/2025 >60 >60 mL/min/1.73/m2 Final   02/26/2025 >60.0 >60 mL/min/1.73 m^2 Final       PEtCT  Impression:     1.  Large centrally necrotic mass in the right lung extending from the hilum into the right lower lobe compatible with malignancy, noting this appears to opacify/obstruct the right mainstem bronchus, with postobstructive atelectasis/consolidation in the right lower lobe.     2.  Mild faint scattered tree-in-bud opacity in the adjacent right lung suggesting a combination of mucous, secretion, aspiration and or small airways disease.     3.  No PET-CT avid distant metastatic disease.      Path bronchial wash  LUNG, RIGHT, BRONCHIAL WASH:     --POSITIVE FOR SQUAMOUS CELL CARCINOMA        Assessment    Squamous cell carcinoma of the right lung.  T4 N1 M0.  Stage III a.  ECOG score 1.  Abnormal  weight loss secondary to cancer.  Active tobacco use.    Nonsurgical candidate    Plan for concurrent chemoradiation involving carbo Taxol paclitaxel weekly with radiation x6 weeks.  This will be followed by consolidation of immunotherapy.      Abnormal weight loss secondary to malignancy.  Starting on Megace and referred to nutritionist.    Anemia in the setting of malignancy    Leukocytosis reactive - active     Thrombocytosis reactive -active     Hyponatremia SIADH plus dehydration - start on salt tablet 1000 mg b.i.d. 1 L normal saline. Will monitor labs.  I will have patient return to clinic one week for tox check     Patient's scores false-positive on oncology stress test as well social score test.  We will make appropriate referral to those department  Plan    There are no diagnoses linked to this encounter.

## 2025-06-27 NOTE — Clinical Note
RTC one-week post cycle 1 of treatment I will have patient do the blood work for tox check and see them in person

## 2025-06-30 ENCOUNTER — OFFICE VISIT (OUTPATIENT)
Dept: SURGERY | Facility: CLINIC | Age: 65
End: 2025-06-30
Payer: MEDICARE

## 2025-06-30 VITALS — HEART RATE: 95 BPM | SYSTOLIC BLOOD PRESSURE: 105 MMHG | DIASTOLIC BLOOD PRESSURE: 62 MMHG | TEMPERATURE: 100 F

## 2025-06-30 DIAGNOSIS — C34.90 MALIGNANT NEOPLASM OF LUNG, UNSPECIFIED LATERALITY, UNSPECIFIED PART OF LUNG: ICD-10-CM

## 2025-06-30 DIAGNOSIS — R91.8 LUNG MASS: ICD-10-CM

## 2025-06-30 PROCEDURE — 99203 OFFICE O/P NEW LOW 30 MIN: CPT | Mod: S$GLB,,, | Performed by: STUDENT IN AN ORGANIZED HEALTH CARE EDUCATION/TRAINING PROGRAM

## 2025-06-30 PROCEDURE — 99999 PR PBB SHADOW E&M-EST. PATIENT-LVL III: CPT | Mod: PBBFAC,,, | Performed by: STUDENT IN AN ORGANIZED HEALTH CARE EDUCATION/TRAINING PROGRAM

## 2025-06-30 PROCEDURE — 4010F ACE/ARB THERAPY RXD/TAKEN: CPT | Mod: CPTII,S$GLB,, | Performed by: STUDENT IN AN ORGANIZED HEALTH CARE EDUCATION/TRAINING PROGRAM

## 2025-06-30 PROCEDURE — 3078F DIAST BP <80 MM HG: CPT | Mod: CPTII,S$GLB,, | Performed by: STUDENT IN AN ORGANIZED HEALTH CARE EDUCATION/TRAINING PROGRAM

## 2025-06-30 PROCEDURE — 1159F MED LIST DOCD IN RCRD: CPT | Mod: CPTII,S$GLB,, | Performed by: STUDENT IN AN ORGANIZED HEALTH CARE EDUCATION/TRAINING PROGRAM

## 2025-06-30 PROCEDURE — 1126F AMNT PAIN NOTED NONE PRSNT: CPT | Mod: CPTII,S$GLB,, | Performed by: STUDENT IN AN ORGANIZED HEALTH CARE EDUCATION/TRAINING PROGRAM

## 2025-06-30 PROCEDURE — 3074F SYST BP LT 130 MM HG: CPT | Mod: CPTII,S$GLB,, | Performed by: STUDENT IN AN ORGANIZED HEALTH CARE EDUCATION/TRAINING PROGRAM

## 2025-06-30 RX ORDER — TIOTROPIUM BROMIDE AND OLODATEROL 3.124; 2.736 UG/1; UG/1
SPRAY, METERED RESPIRATORY (INHALATION)
COMMUNITY
Start: 2025-06-26

## 2025-06-30 RX ORDER — CEFAZOLIN SODIUM 2 G/50ML
2 SOLUTION INTRAVENOUS
Status: CANCELLED | OUTPATIENT
Start: 2025-06-30

## 2025-06-30 RX ORDER — SODIUM CHLORIDE 9 MG/ML
INJECTION, SOLUTION INTRAVENOUS CONTINUOUS
Status: CANCELLED | OUTPATIENT
Start: 2025-06-30

## 2025-06-30 NOTE — H&P (VIEW-ONLY)
History & Physical    Subjective     History of Present Illness:  Patient is a 64 y.o. male presents with lung cancer in the right lower lobe.  He was referred to me for port placement.    Chief Complaint   Patient presents with    Consult       Review of patient's allergies indicates:  No Known Allergies    Current Medications[1]    Past Medical History:   Diagnosis Date    Coronary artery disease     cardiac stents    Hypertension      Past Surgical History:   Procedure Laterality Date    ENDOBRONCHIAL ULTRASOUND N/A 6/18/2025    Procedure: ENDOBRONCHIAL ULTRASOUND (EBUS);  Surgeon: Luis M Sandoval MD;  Location: [facility];  Service: Pulmonary;  Laterality: N/A;     Family History   Problem Relation Name Age of Onset    Lung cancer Sister      Cancer Sister      Cancer Paternal Uncle      Skin cancer Paternal Uncle      Cancer Maternal Grandmother      Lung cancer Maternal Grandfather      Cancer Maternal Grandfather       Social History[2]     Review of Systems:  Review of Systems   Constitutional: Negative.  Negative for fatigue and fever.   HENT: Negative.     Eyes: Negative.    Respiratory:  Positive for shortness of breath.    Cardiovascular: Negative.  Negative for chest pain.   Endocrine: Negative.    Genitourinary: Negative.    Musculoskeletal: Negative.    Skin: Negative.    Allergic/Immunologic: Negative.    Neurological: Negative.    Hematological: Negative.    Psychiatric/Behavioral: Negative.            Objective     Vital Signs (Most Recent)  Temp: 100 °F (37.8 °C) (06/30/25 1333)  Pulse: 95 (06/30/25 1333)  BP: 105/62 (06/30/25 1333)           Physical Exam:  Physical Exam  Constitutional:       General: He is not in acute distress.     Appearance: Normal appearance. He is not ill-appearing, toxic-appearing or diaphoretic.   HENT:      Head: Normocephalic.      Nose: Nose normal.   Eyes:      Conjunctiva/sclera: Conjunctivae normal.   Cardiovascular:      Rate and Rhythm: Normal rate and regular  rhythm.   Pulmonary:      Breath sounds: Wheezing present.   Abdominal:      Palpations: Abdomen is soft.   Musculoskeletal:         General: Normal range of motion.      Cervical back: Normal range of motion.   Skin:     General: Skin is warm.   Neurological:      General: No focal deficit present.      Mental Status: He is alert.   Psychiatric:         Mood and Affect: Mood normal.         Diagnostic Results:  PET was reviewed.  PET avid mass in the right lower lung     Assessment and Plan   64-year-old male with lung cancer on the right lower lobe    PLAN:  Risks versus benefits of port placement were discussed.  Consent was obtained for the planned procedure.  Tentative right IJ approach.  Surgery was scheduled for Thursday.                [1]   Current Outpatient Medications   Medication Sig Dispense Refill    albuterol (PROVENTIL) 2.5 mg /3 mL (0.083 %) nebulizer solution Take 3 mLs (2.5 mg total) by nebulization every 6 (six) hours as needed for Wheezing or Shortness of Breath. Rescue 240 mL 11    albuterol (PROVENTIL/VENTOLIN HFA) 90 mcg/actuation inhaler Inhale 2 puffs into the lungs.      budesonide-glycopyr-formoterol (BREZTRI AEROSPHERE) 160-9-4.8 mcg/actuation HFAA Inhale 2 puffs into the lungs 2 (two) times a day. 32.1 g 3    lisinopriL (PRINIVIL,ZESTRIL) 20 MG tablet 20 mg.      megestroL (MEGACE) 40 MG Tab Take 1 tablet (40 mg total) by mouth 2 (two) times daily. 60 tablet 11    nystatin (MYCOSTATIN) 100,000 unit/mL suspension Take 4 mLs (400,000 Units total) by mouth 4 (four) times daily. for 7 days 112 mL 0    ondansetron (ZOFRAN) 8 MG tablet Take 1 tablet (8 mg total) by mouth every 8 (eight) hours as needed for Nausea. 30 tablet 2    promethazine (PHENERGAN) 25 MG tablet Take 1 tablet (25 mg total) by mouth every 4 to 6 hours as needed for Nausea. 30 tablet 2    sodium chloride 1,000 mg TbSO oral tablet Take 1 tablet (1,000 mg total) by mouth 2 (two) times a day. 60 tablet 0    STIOLTO RESPIMAT  2.5-2.5 mcg/actuation Mist SMARTSI Puff(s) Via Inhaler Daily      aspirin (ECOTRIN) 81 MG EC tablet Take 1 tablet (81 mg total) by mouth once daily.  0     No current facility-administered medications for this visit.   [2]   Social History  Tobacco Use    Smoking status: Every Day     Current packs/day: 2.00     Types: Cigarettes    Tobacco comments:     Smoking on oxygen    Substance Use Topics    Alcohol use: Not Currently     Comment: occas

## 2025-06-30 NOTE — PROGRESS NOTES
History & Physical    Subjective     History of Present Illness:  Patient is a 64 y.o. male presents with lung cancer in the right lower lobe.  He was referred to me for port placement.    Chief Complaint   Patient presents with    Consult       Review of patient's allergies indicates:  No Known Allergies    Current Medications[1]    Past Medical History:   Diagnosis Date    Coronary artery disease     cardiac stents    Hypertension      Past Surgical History:   Procedure Laterality Date    ENDOBRONCHIAL ULTRASOUND N/A 6/18/2025    Procedure: ENDOBRONCHIAL ULTRASOUND (EBUS);  Surgeon: Luis M Sandoval MD;  Location: [facility];  Service: Pulmonary;  Laterality: N/A;     Family History   Problem Relation Name Age of Onset    Lung cancer Sister      Cancer Sister      Cancer Paternal Uncle      Skin cancer Paternal Uncle      Cancer Maternal Grandmother      Lung cancer Maternal Grandfather      Cancer Maternal Grandfather       Social History[2]     Review of Systems:  Review of Systems   Constitutional: Negative.  Negative for fatigue and fever.   HENT: Negative.     Eyes: Negative.    Respiratory:  Positive for shortness of breath.    Cardiovascular: Negative.  Negative for chest pain.   Endocrine: Negative.    Genitourinary: Negative.    Musculoskeletal: Negative.    Skin: Negative.    Allergic/Immunologic: Negative.    Neurological: Negative.    Hematological: Negative.    Psychiatric/Behavioral: Negative.            Objective     Vital Signs (Most Recent)  Temp: 100 °F (37.8 °C) (06/30/25 1333)  Pulse: 95 (06/30/25 1333)  BP: 105/62 (06/30/25 1333)           Physical Exam:  Physical Exam  Constitutional:       General: He is not in acute distress.     Appearance: Normal appearance. He is not ill-appearing, toxic-appearing or diaphoretic.   HENT:      Head: Normocephalic.      Nose: Nose normal.   Eyes:      Conjunctiva/sclera: Conjunctivae normal.   Cardiovascular:      Rate and Rhythm: Normal rate and regular  rhythm.   Pulmonary:      Breath sounds: Wheezing present.   Abdominal:      Palpations: Abdomen is soft.   Musculoskeletal:         General: Normal range of motion.      Cervical back: Normal range of motion.   Skin:     General: Skin is warm.   Neurological:      General: No focal deficit present.      Mental Status: He is alert.   Psychiatric:         Mood and Affect: Mood normal.         Diagnostic Results:  PET was reviewed.  PET avid mass in the right lower lung     Assessment and Plan   64-year-old male with lung cancer on the right lower lobe    PLAN:  Risks versus benefits of port placement were discussed.  Consent was obtained for the planned procedure.  Tentative right IJ approach.  Surgery was scheduled for Thursday.                [1]   Current Outpatient Medications   Medication Sig Dispense Refill    albuterol (PROVENTIL) 2.5 mg /3 mL (0.083 %) nebulizer solution Take 3 mLs (2.5 mg total) by nebulization every 6 (six) hours as needed for Wheezing or Shortness of Breath. Rescue 240 mL 11    albuterol (PROVENTIL/VENTOLIN HFA) 90 mcg/actuation inhaler Inhale 2 puffs into the lungs.      budesonide-glycopyr-formoterol (BREZTRI AEROSPHERE) 160-9-4.8 mcg/actuation HFAA Inhale 2 puffs into the lungs 2 (two) times a day. 32.1 g 3    lisinopriL (PRINIVIL,ZESTRIL) 20 MG tablet 20 mg.      megestroL (MEGACE) 40 MG Tab Take 1 tablet (40 mg total) by mouth 2 (two) times daily. 60 tablet 11    nystatin (MYCOSTATIN) 100,000 unit/mL suspension Take 4 mLs (400,000 Units total) by mouth 4 (four) times daily. for 7 days 112 mL 0    ondansetron (ZOFRAN) 8 MG tablet Take 1 tablet (8 mg total) by mouth every 8 (eight) hours as needed for Nausea. 30 tablet 2    promethazine (PHENERGAN) 25 MG tablet Take 1 tablet (25 mg total) by mouth every 4 to 6 hours as needed for Nausea. 30 tablet 2    sodium chloride 1,000 mg TbSO oral tablet Take 1 tablet (1,000 mg total) by mouth 2 (two) times a day. 60 tablet 0    STIOLTO RESPIMAT  2.5-2.5 mcg/actuation Mist SMARTSI Puff(s) Via Inhaler Daily      aspirin (ECOTRIN) 81 MG EC tablet Take 1 tablet (81 mg total) by mouth once daily.  0     No current facility-administered medications for this visit.   [2]   Social History  Tobacco Use    Smoking status: Every Day     Current packs/day: 2.00     Types: Cigarettes    Tobacco comments:     Smoking on oxygen    Substance Use Topics    Alcohol use: Not Currently     Comment: occas

## 2025-07-01 ENCOUNTER — DOCUMENTATION ONLY (OUTPATIENT)
Facility: CLINIC | Age: 65
End: 2025-07-01
Payer: MEDICARE

## 2025-07-01 ENCOUNTER — INFUSION (OUTPATIENT)
Dept: INFUSION THERAPY | Facility: HOSPITAL | Age: 65
End: 2025-07-01
Attending: INTERNAL MEDICINE
Payer: MEDICARE

## 2025-07-01 ENCOUNTER — TELEPHONE (OUTPATIENT)
Dept: INFUSION THERAPY | Facility: HOSPITAL | Age: 65
End: 2025-07-01

## 2025-07-01 VITALS
HEART RATE: 70 BPM | TEMPERATURE: 98 F | HEIGHT: 69 IN | SYSTOLIC BLOOD PRESSURE: 112 MMHG | OXYGEN SATURATION: 93 % | DIASTOLIC BLOOD PRESSURE: 50 MMHG | BODY MASS INDEX: 17.76 KG/M2 | WEIGHT: 119.94 LBS | RESPIRATION RATE: 18 BRPM

## 2025-07-01 DIAGNOSIS — C34.31 MALIGNANT NEOPLASM OF LOWER LOBE OF RIGHT LUNG: Primary | ICD-10-CM

## 2025-07-01 PROCEDURE — 96417 CHEMO IV INFUS EACH ADDL SEQ: CPT

## 2025-07-01 PROCEDURE — 96375 TX/PRO/DX INJ NEW DRUG ADDON: CPT

## 2025-07-01 PROCEDURE — 96367 TX/PROPH/DG ADDL SEQ IV INF: CPT

## 2025-07-01 PROCEDURE — 63600175 PHARM REV CODE 636 W HCPCS: Performed by: INTERNAL MEDICINE

## 2025-07-01 PROCEDURE — A4216 STERILE WATER/SALINE, 10 ML: HCPCS | Performed by: INTERNAL MEDICINE

## 2025-07-01 PROCEDURE — 25000003 PHARM REV CODE 250: Performed by: INTERNAL MEDICINE

## 2025-07-01 PROCEDURE — 96413 CHEMO IV INFUSION 1 HR: CPT

## 2025-07-01 RX ORDER — SODIUM CHLORIDE 0.9 % (FLUSH) 0.9 %
10 SYRINGE (ML) INJECTION
Status: DISCONTINUED | OUTPATIENT
Start: 2025-07-01 | End: 2025-07-01 | Stop reason: HOSPADM

## 2025-07-01 RX ORDER — FAMOTIDINE 10 MG/ML
20 INJECTION, SOLUTION INTRAVENOUS
Status: COMPLETED | OUTPATIENT
Start: 2025-07-01 | End: 2025-07-01

## 2025-07-01 RX ORDER — PROCHLORPERAZINE EDISYLATE 5 MG/ML
5 INJECTION INTRAMUSCULAR; INTRAVENOUS ONCE AS NEEDED
Status: DISCONTINUED | OUTPATIENT
Start: 2025-07-01 | End: 2025-07-01 | Stop reason: HOSPADM

## 2025-07-01 RX ORDER — DIPHENHYDRAMINE HYDROCHLORIDE 50 MG/ML
50 INJECTION, SOLUTION INTRAMUSCULAR; INTRAVENOUS ONCE AS NEEDED
Status: DISCONTINUED | OUTPATIENT
Start: 2025-07-01 | End: 2025-07-01 | Stop reason: HOSPADM

## 2025-07-01 RX ORDER — EPINEPHRINE 0.3 MG/.3ML
0.3 INJECTION SUBCUTANEOUS ONCE AS NEEDED
Status: DISCONTINUED | OUTPATIENT
Start: 2025-07-01 | End: 2025-07-01 | Stop reason: HOSPADM

## 2025-07-01 RX ADMIN — FAMOTIDINE 20 MG: 10 INJECTION INTRAVENOUS at 07:07

## 2025-07-01 RX ADMIN — SODIUM CHLORIDE 72 MG: 9 INJECTION, SOLUTION INTRAVENOUS at 09:07

## 2025-07-01 RX ADMIN — PALONOSETRON HYDROCHLORIDE 0.25 MG: 0.25 INJECTION, SOLUTION INTRAVENOUS at 08:07

## 2025-07-01 RX ADMIN — CARBOPLATIN 230 MG: 450 INJECTION, SOLUTION INTRAVENOUS at 10:07

## 2025-07-01 RX ADMIN — SODIUM CHLORIDE 50 MG: 9 INJECTION, SOLUTION INTRAVENOUS at 08:07

## 2025-07-01 RX ADMIN — SODIUM CHLORIDE: 9 INJECTION, SOLUTION INTRAVENOUS at 07:07

## 2025-07-01 RX ADMIN — SODIUM CHLORIDE, PRESERVATIVE FREE 10 ML: 5 INJECTION INTRAVENOUS at 11:07

## 2025-07-01 NOTE — PROGRESS NOTES
Medical Nutrition Therapy Oncology Progress Note      Patient's PCP:Kim Bowser NP    Referring Provider: Nelson Sheets MD  Subjective:       Patient ID: Vlad Gonzales is a 64 y.o. male.    Chief Complaint: Unintentional weight loss related to lung cancer     Assessment:     Nutrition/Diet History     Patient Reported Diet/Restrictions/Preferences: none  Food Allergies: No known food allergies   Factors Affecting Nutritional Intake: Cancer  Nutrition Related Social Determinants of Health: SDOH: Participates in community based programs to support access to food    Diet/PO Recall: breakfast and dinner only   Appetite: low   Fluid Intake: Not adequate fluid intake    Diet Recall:  Breakfast: bowl of cereal with milk   Lunch: none  Dinner: peanut butter sandwich with milk   Snacks: none  Drinks: coffee, tea, water  Supplements: none     Estimated/Assessed Needs     Weight Used For Calorie Calculations: 71 kg (156 lb) IBW   Energy Calorie Requirements (kcal): 9845-3680 kcal/day   Energy Need Method: 30-35 Kcal/kg (COPD + Cancer w/Cachexia)  Protein Requirements:  g/day   Protein Need Method: 1.3-1.5 g/kg (COPD + Cancer w/Cachexia)  Fluid Requirements: 2300 ml/day  Estimated Fluid Requirement Method: 1ml/kcal      Nutrition Support  None at this time.     Evaluation of Received Nutrient/Fluid Intake     Calorie Intake: not meeting needs  Protein Intake: not meeting needs  Fluid Intake: meeting needs  Tolerance: tolerating  % Intake of Estimated Energy Needs: 40-50 %    Nutrition Diagnosis: Severe Malnutrition related to diagnosis of chronic disease cancer and COPD as evidenced by signs of severe muscle wasting(temples, clavicles, scapula, and calf) and severe fat loss (orbital, triceps, fat overlying the ribs, buccal).     Nutrition Diagnosis: Increased nutrient needs related to increased energy expenditure as evidenced by diagnosis of cancer and COPD.     RD  Notes    6/24(initial): Spoke to patient today regarding unintentional weight loss. Patient shows signs of severe malnutrition (severe muscle wasting and fat loss). Patient reported that his appetite is low. He mentioned that his doctor prescribed him Megace appetite stimulant to improve his intake. Patient eats 2 small meals a day and no snacks. Patient does not adequately hydrate throughout the day; we discussed ways to drink more fluids throughout the day. Patient denied nausea, vomiting, diarrhea, constipation. He stated that he just doesn't feel like eating; however, he is now determined to start eating more calories and protein and drinking more fluids. He typically eats quick foods at dinner time; however, he does enjoy cooking if ingredients are available. He is currently signed up for the food pantry. We discussed starting a supplement of Boost or Ensure for his missed meal; he voiced that he would like to give that a try. Gave patient samples of Ensure Complete today to take home. Also, signed up patient to receive Boost supplements along with frozen meals from [employer] to help improve his intake. He stated that he would drink the supplements if more were provided. Recommended that patient start supplementing with at least 1-2 Ensure/Boost supplements and to add in snacks that are a good source of protein, such as greek yogurt, peanut butter, boiled eggs, and chicken salad, to meet higher protein needs. Additionally, we also talked about increasing calories to prevent further weight loss by adding in calorie-dense foods to meals and snacks, such as whole milk, peanut butter, yogurt, granola, icecream, butter/oils, and avocados. Patient will also try to start one calorie-dense smoothie daily. Patient denies having any nutrition related questions or concerns at the current time. RD will continue to monitor any weight changes, appetite, PO intake, and labs. CW: 115#    7/1([employer]o [employer]): Patient gained 4  pounds since previous visit. I met with patient for [employer] today. Patient reported that his appetite is the same, consuming 2 meals a day plus snacks. He started on Boost supplement; currently, he is drinking 3-4 Boost Supplements a day. Patient is working on adequately hydrating throughout the day. Educated patient on weight maintenance, food safety, hydration, and increased caloric and protein intake through small meals and snacks. Patient stated that he is trying to take my advice on incorporating high-calorie foods into snacks and meals. He mentioned that he is planning on getting a  to make high-calorie shakes that we talked about. Made goals with patient to continue to add in 4 Boost supplements and to add in snacks that are a good source of protein and calories to meet higher protein and calorie needs. Patient mentioned that the [employer] company dropped off food; however, it was under a tree where he couldn't find till later. Will contact company to make sure that it is delivered on his doorstep. Patient denies having any nutrition related questions or concerns at the current time. RD will continue to monitor any weight changes, appetite, PO intake, and labs. CW: 119#    Nutrition Intervention:      Nutrition Intervention General/healthful diet   Goals/Expected Outcomes Meet % EEN/EPN    Progress Progressing towards goal     Plan  Reviewed chemo school packet & provided copy to pt.   Discussed importance of maintaining wt & staying hydrated.   Reviewed food safety guidelines recommended during treatment.   Recommended 4 Boost daily to better meet calorie and protein needs.   Encouraged to continue to increase nutrient density of current intake and high calorie/protein food choices.  Provided RD contact info & encouraged pt to call with any questions/concerns.   Will f/u as needed.     Past Medical History:   Diagnosis Date    Coronary artery disease     cardiac stents    Heart attack      Hepatitis C     Hypertension        Past Surgical History:   Procedure Laterality Date    ANGIOGRAM, CORONARY, WITH LEFT HEART CATHETERIZATION  2013    stent    ENDOBRONCHIAL ULTRASOUND N/A 06/18/2025    Procedure: ENDOBRONCHIAL ULTRASOUND (EBUS);  Surgeon: Luis M Sandoval MD;  Location: [facility];  Service: Pulmonary;  Laterality: N/A;       Social History     Socioeconomic History    Marital status:    Tobacco Use    Smoking status: Every Day     Current packs/day: 0.50     Average packs/day: 2.0 packs/day for 40.5 years (80.7 ttl pk-yrs)     Types: Cigarettes     Start date: 1985    Tobacco comments:     Smoking on oxygen      7/1/25 pt instructed no smoking prior to sx   Substance and Sexual Activity    Alcohol use: Not Currently     Comment: quit 6/2025    Drug use: Not Currently     Social Drivers of Health     Financial Resource Strain: High Risk (4/26/2025)    Received from [facility] SDOH Screening     In the past year, have you been unable to get any of the following when you really needed them? choose all that apply.: Internet   Food Insecurity: High Risk (4/26/2025)    Received from [facility] SDOH Screening     In the past 2 months, did you or others you live with eat smaller meals or skip meals because you didn't have money for food?: Yes   Transportation Needs: High Risk (4/26/2025)    Received from [facility] SDOH Screening     Has lack of transportation kept you from medical appointments, meetings, work or from getting things needed for daily living? choose all that apply.: Yes, it has kept me from medical appointments or from getting my medications     Has lack of transportation kept you from medical appointments, meetings, work or from getting things needed for daily living? choose all that apply.: Yes, it has kept me from non-medical meetings, appointments, work or from getting things that i need   Physical Activity: Insufficiently Active  (10/17/2024)    Received from [facility] and [facility]    Exercise Vital Sign     Days of Exercise per Week: 5 days     Minutes of Exercise per Session: 20 min   Stress: Patient Declined (10/18/2024)    Received from [facility] and [facility]    [place] [facility] [employer] - Occupational Stress Questionnaire     Feeling of Stress : Patient declined   Housing Stability: Low Risk  (10/17/2024)    Received from [facility] and [facility]    Housing Stability Vital Sign     Unable to Pay for Housing in the Last Year: No     Number of Times Moved in the Last Year: 1     Homeless in the Last Year: No       Family History   Problem Relation Name Age of Onset    Lung cancer Sister      Cancer Sister      Cancer Paternal Uncle      Skin cancer Paternal Uncle      Cancer Maternal Grandmother      Lung cancer Maternal Grandfather      Cancer Maternal Grandfather         Review of patient's allergies indicates:  No Known Allergies  Current Medications[1]    All medications and past history have been reviewed.    OP NSCLC PACLitaxel CARBOplatin QW + radiation      Treatment Goal:   Control      Status:   Active      Start Date:   7/1/2025      End Date:   8/6/2025 (Planned)      Provider:   Nelson Sheets MD      Chemotherapy:   CARBOplatin (PARAPLATIN) 230 mg in 0.9% NaCl 308 mL chemo infusion, 230 mg, Intravenous, Clinic/HOD 1 time, 1 of 7 cycles    Administration: 230 mg (7/1/2025)        PACLitaxeL (TAXOL) 45 mg/m2 = 72 mg in 0.9% NaCl 100 mL chemo infusion, 45 mg/m2 = 72 mg, Intravenous, Clinic/HOD 1 time, 1 of 7 cycles    Administration: 72 mg (7/1/2025)      Objective:        Wt Readings from Last 1 Encounters:   07/01/25 0730 54.4 kg (119 lb 14.9 oz)       Last Labs:  Last Labs:  Glucose   Date Value Ref Range Status   06/26/2025 131 (H) 70 - 110 mg/dL Final   06/16/2025 100 70 - 110 mg/dL Final     BUN   Date Value Ref Range Status   06/26/2025  "18 8 - 23 mg/dL Final   06/16/2025 13 8 - 23 mg/dL Final     Creatinine   Date Value Ref Range Status   06/26/2025 0.6 0.5 - 1.4 mg/dL Final   06/16/2025 0.6 0.5 - 1.4 mg/dL Final     Sodium   Date Value Ref Range Status   06/26/2025 128 (L) 136 - 145 mmol/L Final   06/16/2025 131 (L) 136 - 145 mmol/L Final     Potassium   Date Value Ref Range Status   06/26/2025 4.4 3.5 - 5.1 mmol/L Final   06/16/2025 4.0 3.5 - 5.1 mmol/L Final     No results found for: "PHOS"  Calcium   Date Value Ref Range Status   06/26/2025 10.2 8.7 - 10.5 mg/dL Final   06/16/2025 10.4 8.7 - 10.5 mg/dL Final     No results found for: "PREALBUMIN"  Protein Total   Date Value Ref Range Status   06/26/2025 7.7 6.0 - 8.4 gm/dL Final     Total Protein   Date Value Ref Range Status   02/26/2025 7.5 6.0 - 8.4 g/dL Final   03/20/2020 7.0 6.0 - 8.4 g/dL Final     No results found for: "CHOL"  No results found for: "HGBA1C"  Hgb   Date Value Ref Range Status   06/26/2025 10.9 (L) 14.0 - 18.0 gm/dL Final   06/16/2025 11.4 (L) 14.0 - 18.0 gm/dL Final     Hct   Date Value Ref Range Status   06/26/2025 34.2 (L) 40.0 - 54.0 % Final   06/16/2025 35.0 (L) 40.0 - 54.0 % Final     No results found for: "IRON"  No components found for: "FROLATE"  No results found for: "SZEPSWNA22SY"  WBC   Date Value Ref Range Status   06/26/2025 15.30 (H) 3.90 - 12.70 K/uL Final   06/16/2025 11.68 3.90 - 12.70 K/uL Final       Monitoring/Evaluation:     Monitor: Any weight changes, labs, appetite, and PO intake.     Next Visit: PRN      I have explained and the patient understands all of  the current recommendation(s). I have answered all of their questions to the best of my ability and to their complete satisfaction.   The patient is to continue with the current management plan.    Electronically signed by:   Tania Moss MS, RD, LDN         [1]   Current Outpatient Medications:     albuterol (PROVENTIL) 2.5 mg /3 mL (0.083 %) nebulizer solution, Take 3 mLs (2.5 mg total) by " nebulization every 6 (six) hours as needed for Wheezing or Shortness of Breath. Rescue, Disp: 240 mL, Rfl: 11    albuterol (PROVENTIL/VENTOLIN HFA) 90 mcg/actuation inhaler, Inhale 2 puffs into the lungs., Disp: , Rfl:     aspirin (ECOTRIN) 81 MG EC tablet, Take 1 tablet (81 mg total) by mouth once daily., Disp: , Rfl: 0    budesonide-glycopyr-formoterol (BREZTRI AEROSPHERE) 160-9-4.8 mcg/actuation HFAA, Inhale 2 puffs into the lungs 2 (two) times a day., Disp: 32.1 g, Rfl: 3    lisinopriL (PRINIVIL,ZESTRIL) 20 MG tablet, 20 mg., Disp: , Rfl:     megestroL (MEGACE) 40 MG Tab, Take 1 tablet (40 mg total) by mouth 2 (two) times daily., Disp: 60 tablet, Rfl: 11    nystatin (MYCOSTATIN) 100,000 unit/mL suspension, Take 4 mLs (400,000 Units total) by mouth 4 (four) times daily. for 7 days, Disp: 112 mL, Rfl: 0    ondansetron (ZOFRAN) 8 MG tablet, Take 1 tablet (8 mg total) by mouth every 8 (eight) hours as needed for Nausea., Disp: 30 tablet, Rfl: 2    promethazine (PHENERGAN) 25 MG tablet, Take 1 tablet (25 mg total) by mouth every 4 to 6 hours as needed for Nausea., Disp: 30 tablet, Rfl: 2    sodium chloride 1,000 mg TbSO oral tablet, Take 1 tablet (1,000 mg total) by mouth 2 (two) times a day., Disp: 60 tablet, Rfl: 0    STIOLTO RESPIMAT 2.5-2.5 mcg/actuation Mist, 7/1/25 pt not taking, Disp: , Rfl:   No current facility-administered medications for this visit.    Facility-Administered Medications Ordered in Other Visits:     0.9% NaCl 250 mL flush bag, , Intravenous, PRN, Nelson Sheets MD, Stopped at 07/01/25 1047    diphenhydrAMINE injection 50 mg, 50 mg, Intravenous, Once PRN, Nelson Sheets MD    EPINEPHrine (EPIPEN) 0.3 mg/0.3 mL pen injection 0.3 mg, 0.3 mg, Intramuscular, Once PRN, Nelson Sheets MD    hydrocortisone sodium succinate injection 100 mg, 100 mg, Intravenous, Once PRN, Nelson Sheets MD    prochlorperazine injection Soln 5 mg, 5 mg, Intravenous, Once PRN, Nelson Sheets MD    sodium chloride 0.9% flush 10  mL, 10 mL, Intravenous, PRN, Nelson Sheets MD, 10 mL at 07/01/25 1131

## 2025-07-01 NOTE — PLAN OF CARE
Problem: Fatigue  Goal: Improved Activity Tolerance  7/1/2025 0805 by Benita Geiger, RN  Outcome: Met  7/1/2025 0805 by Benita Geiger, RN  Outcome: Progressing

## 2025-07-03 ENCOUNTER — TELEPHONE (OUTPATIENT)
Dept: SURGERY | Facility: CLINIC | Age: 65
End: 2025-07-03
Payer: MEDICARE

## 2025-07-03 ENCOUNTER — NURSE TRIAGE (OUTPATIENT)
Dept: ADMINISTRATIVE | Facility: CLINIC | Age: 65
End: 2025-07-03
Payer: MEDICARE

## 2025-07-03 ENCOUNTER — HOSPITAL ENCOUNTER (OUTPATIENT)
Dept: RADIOLOGY | Facility: HOSPITAL | Age: 65
Discharge: HOME | End: 2025-07-03
Attending: STUDENT IN AN ORGANIZED HEALTH CARE EDUCATION/TRAINING PROGRAM
Payer: MEDICARE

## 2025-07-03 DIAGNOSIS — C34.90 LUNG CANCER: ICD-10-CM

## 2025-07-03 NOTE — TELEPHONE ENCOUNTER
"Pt very upset, "Im about to eat something and drink something, starving to death for 9 hours, they need to do better." Attempted to clarify with pt, states "they can call me and reschedule." Pt hangs up.   Reason for Disposition   Caller hangs up    Protocols used: Difficult Call-A-AH    "

## 2025-07-03 NOTE — TELEPHONE ENCOUNTER
Attempted to contact the patient, voice mail box is full  We can reschedule him on 7- at [facility] and make him first case.

## 2025-07-03 NOTE — TELEPHONE ENCOUNTER
Copied from CRM #[id number]. Topic: Appointments - Appointment Rescheduling  >> Jul 3, 2025 10:25 AM Adolfo wrote:  Consult/Advisory    Name Of Caller:Vlad       Contact Preference:[phone]    Nature of call: Pt called to reschedule his procedure please call to assist

## 2025-07-03 NOTE — TELEPHONE ENCOUNTER
"Pt calling back, wants to apologize and wants to keep appointment. Pt states "drank a little coffee and now feeling better." Pt states will be there for procedure at 9am.   Reason for Disposition   [1] Follow-up call to recent contact AND [2] information only call, no triage required    Protocols used: Information Only Call - No Triage-A-    "

## 2025-07-07 ENCOUNTER — OFFICE VISIT (OUTPATIENT)
Dept: HEMATOLOGY/ONCOLOGY | Facility: CLINIC | Age: 65
End: 2025-07-07
Payer: MEDICARE

## 2025-07-07 ENCOUNTER — LAB VISIT (OUTPATIENT)
Dept: LAB | Facility: HOSPITAL | Age: 65
End: 2025-07-07
Attending: RADIOLOGY
Payer: MEDICARE

## 2025-07-07 ENCOUNTER — SOCIAL WORK (OUTPATIENT)
Dept: HEMATOLOGY/ONCOLOGY | Facility: CLINIC | Age: 65
End: 2025-07-07

## 2025-07-07 VITALS
TEMPERATURE: 99 F | RESPIRATION RATE: 20 BRPM | HEART RATE: 94 BPM | DIASTOLIC BLOOD PRESSURE: 55 MMHG | BODY MASS INDEX: 17.86 KG/M2 | SYSTOLIC BLOOD PRESSURE: 114 MMHG | HEIGHT: 69 IN | OXYGEN SATURATION: 92 % | WEIGHT: 120.56 LBS

## 2025-07-07 DIAGNOSIS — Z71.3 NUTRITIONAL COUNSELING: ICD-10-CM

## 2025-07-07 DIAGNOSIS — E86.0 DEHYDRATION: ICD-10-CM

## 2025-07-07 DIAGNOSIS — C34.90 MALIGNANT NEOPLASM OF LUNG, UNSPECIFIED LATERALITY, UNSPECIFIED PART OF LUNG: ICD-10-CM

## 2025-07-07 DIAGNOSIS — C34.31 MALIGNANT NEOPLASM OF LOWER LOBE OF RIGHT LUNG: ICD-10-CM

## 2025-07-07 DIAGNOSIS — E22.2 SIADH (SYNDROME OF INAPPROPRIATE ADH PRODUCTION): ICD-10-CM

## 2025-07-07 DIAGNOSIS — R63.4 LOSS OF WEIGHT: ICD-10-CM

## 2025-07-07 DIAGNOSIS — E87.1 HYPONATREMIA: ICD-10-CM

## 2025-07-07 DIAGNOSIS — C34.31 MALIGNANT NEOPLASM OF LOWER LOBE OF RIGHT LUNG: Primary | ICD-10-CM

## 2025-07-07 LAB
ABSOLUTE EOSINOPHIL (SMH): 0.2 K/UL
ABSOLUTE MONOCYTE (SMH): 0.7 K/UL (ref 0.3–1)
ABSOLUTE NEUTROPHIL COUNT (SMH): 8.9 K/UL (ref 1.8–7.7)
ALBUMIN SERPL-MCNC: 2.9 G/DL (ref 3.5–5.2)
ALP SERPL-CCNC: 111 UNIT/L (ref 55–135)
ALT SERPL-CCNC: 32 UNIT/L (ref 10–44)
ANION GAP (SMH): 8 MMOL/L (ref 8–16)
AST SERPL-CCNC: 18 UNIT/L (ref 10–40)
BASOPHILS # BLD AUTO: 0.07 K/UL
BASOPHILS NFR BLD AUTO: 0.6 %
BILIRUB SERPL-MCNC: 0.3 MG/DL (ref 0.1–1)
BUN SERPL-MCNC: 17 MG/DL (ref 8–23)
CALCIUM SERPL-MCNC: 8.9 MG/DL (ref 8.7–10.5)
CHLORIDE SERPL-SCNC: 96 MMOL/L (ref 95–110)
CO2 SERPL-SCNC: 27 MMOL/L (ref 23–29)
CREAT SERPL-MCNC: 0.5 MG/DL (ref 0.5–1.4)
ERYTHROCYTE [DISTWIDTH] IN BLOOD BY AUTOMATED COUNT: 13.5 % (ref 11.5–14.5)
GFR SERPLBLD CREATININE-BSD FMLA CKD-EPI: >60 ML/MIN/1.73/M2
GLUCOSE SERPL-MCNC: 116 MG/DL (ref 70–110)
HCT VFR BLD AUTO: 31.5 % (ref 40–54)
HGB BLD-MCNC: 10.1 GM/DL (ref 14–18)
IMM GRANULOCYTES # BLD AUTO: 0.23 K/UL (ref 0–0.04)
IMM GRANULOCYTES NFR BLD AUTO: 2.1 % (ref 0–0.5)
LYMPHOCYTES # BLD AUTO: 0.85 K/UL (ref 1–4.8)
MAGNESIUM SERPL-MCNC: 1.9 MG/DL (ref 1.6–2.6)
MCH RBC QN AUTO: 26.7 PG (ref 27–31)
MCHC RBC AUTO-ENTMCNC: 32.1 G/DL (ref 32–36)
MCV RBC AUTO: 83 FL (ref 82–98)
NUCLEATED RBC (/100WBC) (SMH): 0 /100 WBC
PLATELET # BLD AUTO: 714 K/UL (ref 150–450)
PMV BLD AUTO: 8.4 FL (ref 9.2–12.9)
POTASSIUM SERPL-SCNC: 4 MMOL/L (ref 3.5–5.1)
PROT SERPL-MCNC: 6.9 GM/DL (ref 6–8.4)
RBC # BLD AUTO: 3.78 M/UL (ref 4.6–6.2)
RELATIVE EOSINOPHIL (SMH): 1.8 % (ref 0–8)
RELATIVE LYMPHOCYTE (SMH): 7.7 % (ref 18–48)
RELATIVE MONOCYTE (SMH): 6.4 % (ref 4–15)
RELATIVE NEUTROPHIL (SMH): 81.4 % (ref 38–73)
SODIUM SERPL-SCNC: 131 MMOL/L (ref 136–145)
WBC # BLD AUTO: 10.98 K/UL (ref 3.9–12.7)

## 2025-07-07 PROCEDURE — G2211 COMPLEX E/M VISIT ADD ON: HCPCS | Mod: S$GLB,,, | Performed by: INTERNAL MEDICINE

## 2025-07-07 PROCEDURE — 3074F SYST BP LT 130 MM HG: CPT | Mod: CPTII,S$GLB,, | Performed by: INTERNAL MEDICINE

## 2025-07-07 PROCEDURE — 4010F ACE/ARB THERAPY RXD/TAKEN: CPT | Mod: CPTII,S$GLB,, | Performed by: INTERNAL MEDICINE

## 2025-07-07 PROCEDURE — 85025 COMPLETE CBC W/AUTO DIFF WBC: CPT

## 2025-07-07 PROCEDURE — 99999 PR PBB SHADOW E&M-EST. PATIENT-LVL III: CPT | Mod: PBBFAC,,, | Performed by: INTERNAL MEDICINE

## 2025-07-07 PROCEDURE — 83735 ASSAY OF MAGNESIUM: CPT

## 2025-07-07 PROCEDURE — 3008F BODY MASS INDEX DOCD: CPT | Mod: CPTII,S$GLB,, | Performed by: INTERNAL MEDICINE

## 2025-07-07 PROCEDURE — 1126F AMNT PAIN NOTED NONE PRSNT: CPT | Mod: CPTII,S$GLB,, | Performed by: INTERNAL MEDICINE

## 2025-07-07 PROCEDURE — 99215 OFFICE O/P EST HI 40 MIN: CPT | Mod: S$GLB,,, | Performed by: INTERNAL MEDICINE

## 2025-07-07 PROCEDURE — 3078F DIAST BP <80 MM HG: CPT | Mod: CPTII,S$GLB,, | Performed by: INTERNAL MEDICINE

## 2025-07-07 PROCEDURE — 1101F PT FALLS ASSESS-DOCD LE1/YR: CPT | Mod: CPTII,S$GLB,, | Performed by: INTERNAL MEDICINE

## 2025-07-07 PROCEDURE — 82247 BILIRUBIN TOTAL: CPT

## 2025-07-07 PROCEDURE — 3288F FALL RISK ASSESSMENT DOCD: CPT | Mod: CPTII,S$GLB,, | Performed by: INTERNAL MEDICINE

## 2025-07-07 PROCEDURE — 36415 COLL VENOUS BLD VENIPUNCTURE: CPT

## 2025-07-07 PROCEDURE — 1159F MED LIST DOCD IN RCRD: CPT | Mod: CPTII,S$GLB,, | Performed by: INTERNAL MEDICINE

## 2025-07-07 RX ORDER — EPINEPHRINE 0.3 MG/.3ML
0.3 INJECTION SUBCUTANEOUS ONCE AS NEEDED
Status: CANCELLED | OUTPATIENT
Start: 2025-07-08

## 2025-07-07 RX ORDER — HEPARIN 100 UNIT/ML
500 SYRINGE INTRAVENOUS
Status: CANCELLED | OUTPATIENT
Start: 2025-07-08

## 2025-07-07 RX ORDER — PROCHLORPERAZINE EDISYLATE 5 MG/ML
5 INJECTION INTRAMUSCULAR; INTRAVENOUS ONCE AS NEEDED
Status: CANCELLED | OUTPATIENT
Start: 2025-07-08

## 2025-07-07 RX ORDER — DIPHENHYDRAMINE HYDROCHLORIDE 50 MG/ML
50 INJECTION, SOLUTION INTRAMUSCULAR; INTRAVENOUS ONCE AS NEEDED
Status: CANCELLED | OUTPATIENT
Start: 2025-07-08

## 2025-07-07 RX ORDER — FAMOTIDINE 10 MG/ML
20 INJECTION, SOLUTION INTRAVENOUS
Status: CANCELLED | OUTPATIENT
Start: 2025-07-08

## 2025-07-07 RX ORDER — SODIUM CHLORIDE 0.9 % (FLUSH) 0.9 %
10 SYRINGE (ML) INJECTION
Status: CANCELLED | OUTPATIENT
Start: 2025-07-08

## 2025-07-07 NOTE — PROGRESS NOTES
HPI    64 years old male newly discovered squamous cell carcinoma of the right lung.  Active smoker.  PET scan shows no distant metastases.  MRI of the brain is negative for malignancy involvement.  Clinical staging T4 N1 M0.  Patient's saw Radiation Oncology already.  He is not a surgical candidate.  Planning on concurrent chemoradiation x6 weeks followed by immunotherapy consolidation.    Abnormal weight loss.      Past Medical History:   Diagnosis Date    Coronary artery disease     cardiac stents    Heart attack     Hepatitis C     Hypertension      Social History     Socioeconomic History    Marital status:    Tobacco Use    Smoking status: Every Day     Current packs/day: 0.50     Average packs/day: 2.0 packs/day for 40.5 years (80.7 ttl pk-yrs)     Types: Cigarettes     Start date: 1985    Tobacco comments:     Smoking on oxygen      7/1/25 pt instructed no smoking prior to sx   Substance and Sexual Activity    Alcohol use: Not Currently     Comment: quit 6/2025    Drug use: Not Currently     Social Drivers of Health     Financial Resource Strain: High Risk (4/26/2025)    Received from [facility] SDOH Screening     In the past year, have you been unable to get any of the following when you really needed them? choose all that apply.: Internet   Food Insecurity: High Risk (4/26/2025)    Received from [facility] SDOH Screening     In the past 2 months, did you or others you live with eat smaller meals or skip meals because you didn't have money for food?: Yes   Transportation Needs: High Risk (4/26/2025)    Received from [facility] SDOH Screening     Has lack of transportation kept you from medical appointments, meetings, work or from getting things needed for daily living? choose all that apply.: Yes, it has kept me from medical appointments or from getting my medications     Has lack of transportation kept you from medical appointments, meetings, work or from  getting things needed for daily living? choose all that apply.: Yes, it has kept me from non-medical meetings, appointments, work or from getting things that i need   Physical Activity: Insufficiently Active (10/17/2024)    Received from [facility] and [facility]    Exercise Vital Sign     Days of Exercise per Week: 5 days     Minutes of Exercise per Session: 20 min   Stress: Patient Declined (10/18/2024)    Received from [facility] and [facility] [place] of Occupational Health - Occupational Stress Questionnaire     Feeling of Stress : Patient declined   Housing Stability: Low Risk  (10/17/2024)    Received from [facility] and [facility]    Housing Stability Vital Sign     Unable to Pay for Housing in the Last Year: No     Number of Times Moved in the Last Year: 1     Homeless in the Last Year: No         Subjective      Review of Systems   Constitutional: Negative for appetite change, fatigue and unexpected weight change.   HENT: Negative for mouth sores.   Eyes: Negative for visual disturbance.   Respiratory: Negative for cough and shortness of breath.   Cardiovascular: Negative for chest pain.   Gastrointestinal: Negative for diarrhea.   Genitourinary: Negative for frequency.   Musculoskeletal: Negative for back pain.   Skin: Negative for rash.   Neurological: Negative for headaches.   Hematological: Negative for adenopathy.   Psychiatric/Behavioral: The patient is not nervous/anxious.   All other systems reviewed and are negative.     Objective    Physical Exam   Vitals:    07/07/25 0959   Resp: 20       Constitutional: patient is oriented to person, place, and time. patient appears well-developed and well-nourished. No distress.   HENT:   Right Ear: External ear normal.   Left Ear: External ear normal.   Nose: Nose normal.   Mouth/Throat: Oropharynx is clear and moist. No oropharyngeal exudate.   Teeth, gums and lips are normal   No  sinus tenderness   Palate, tongue, posterior pharynx are normal   Eyes: Conjunctivae and lids are normal.   Neck: Trachea normal and normal range of motion. No thyromegaly   Cardiovascular: Normal rate, regular rhythm, normal heart sounds, intact distal pulses and normal pulses.   No murmur heard.   No edema, no tenderness in the extremities.   Pulmonary/Chest: Effort normal and breath sounds normal. No accessory muscle usage. patient has no wheezes..   Abdominal: Soft. Normal appearance and bowel sounds are normal. patient exhibits no distension and no mass. There is no hepatosplenomegaly. There is no tenderness.   Musculoskeletal: Normal range of motion.   Gait is normal   No clubbing, cyanosis     Lymphadenopathy:   Head (right side): No submental and no submandibular adenopathy present.   Head (left side): No submental and no submandibular adenopathy present.   patient has no cervical adenopathy.   Right: No supraclavicular adenopathy present.   Left: No supraclavicular adenopathy present.   Neurological: patient is alert and oriented to person, place, and time. patient has normal strength and normal reflexes. No sensory deficit. Gait normal.   Skin: Skin is warm, dry and intact. No bruising, no lesion and no rash noted. No cyanosis. Nails show no clubbing.   No lesions   Psychiatric: patient has a normal mood and affect. patient speech is normal and behavior is normal. Judgment normal. Cognition and memory are normal.   Vitals reviewed.     Lab Results   Component Value Date    WBC 10.98 07/07/2025    HGB 10.1 (L) 07/07/2025    HCT 31.5 (L) 07/07/2025    MCV 83 07/07/2025     (H) 07/07/2025       CMP  Sodium   Date Value Ref Range Status   07/07/2025 131 (L) 136 - 145 mmol/L Final     Potassium   Date Value Ref Range Status   07/07/2025 4.0 3.5 - 5.1 mmol/L Final     Chloride   Date Value Ref Range Status   07/07/2025 96 95 - 110 mmol/L Final     CO2   Date Value Ref Range Status   07/07/2025 27 23 - 29  mmol/L Final     Glucose   Date Value Ref Range Status   07/07/2025 116 (H) 70 - 110 mg/dL Final     BUN   Date Value Ref Range Status   07/07/2025 17 8 - 23 mg/dL Final     Creatinine   Date Value Ref Range Status   07/07/2025 0.5 0.5 - 1.4 mg/dL Final     Calcium   Date Value Ref Range Status   07/07/2025 8.9 8.7 - 10.5 mg/dL Final     Protein Total   Date Value Ref Range Status   07/07/2025 6.9 6.0 - 8.4 gm/dL Final     Albumin   Date Value Ref Range Status   07/07/2025 2.9 (L) 3.5 - 5.2 g/dL Final     Bilirubin Total   Date Value Ref Range Status   07/07/2025 0.3 0.1 - 1.0 mg/dL Final     Comment:     For infants and newborns, interpretation of results should be based   on gestational age, weight and in agreement with clinical   observations.    Premature Infant recommended reference ranges:   0-24 hours:  <8.0 mg/dL   24-48 hours: <12.0 mg/dL   3-5 days:    <15.0 mg/dL   6-29 days:   <15.0 mg/dL     ALP   Date Value Ref Range Status   07/07/2025 111 55 - 135 unit/L Final     AST   Date Value Ref Range Status   07/07/2025 18 10 - 40 unit/L Final     ALT   Date Value Ref Range Status   07/07/2025 32 10 - 44 unit/L Final     Anion Gap   Date Value Ref Range Status   07/07/2025 8 8 - 16 mmol/L Final     eGFR   Date Value Ref Range Status   07/07/2025 >60 >60 mL/min/1.73/m2 Final   02/26/2025 >60.0 >60 mL/min/1.73 m^2 Final       PEtCT  Impression:     1.  Large centrally necrotic mass in the right lung extending from the hilum into the right lower lobe compatible with malignancy, noting this appears to opacify/obstruct the right mainstem bronchus, with postobstructive atelectasis/consolidation in the right lower lobe.     2.  Mild faint scattered tree-in-bud opacity in the adjacent right lung suggesting a combination of mucous, secretion, aspiration and or small airways disease.     3.  No PET-CT avid distant metastatic disease.      Path bronchial wash  LUNG, RIGHT, BRONCHIAL WASH:     --POSITIVE FOR SQUAMOUS CELL  CARCINOMA        Assessment    Squamous cell carcinoma of the right lung.  T4 N1 M0.  Stage III a.  ECOG score 1.  Abnormal weight loss secondary to cancer.  Active tobacco use.    Nonsurgical candidate    Plan for concurrent chemoradiation involving carbo Taxol paclitaxel weekly with radiation x6 weeks.  This will be followed by consolidation of immunotherapy.      Abnormal weight loss secondary to malignancy.  Starting on Megace and referred to nutritionist.    Anemia in the setting of malignancy    Leukocytosis reactive - active     Thrombocytosis reactive -active     Hyponatremia SIADH plus dehydration - start on salt tablet 1000 mg b.i.d.+ some NS0.9 hydration with a some of improved. continue salt tablets.  Treating underlying problems malignancy    RTC 1 week with lab for cycle #3       Plan    There are no diagnoses linked to this encounter.

## 2025-07-07 NOTE — PROGRESS NOTES
Met with patient; his friend might not be able to provide transportation.  He has transportation benefits via [employer] but he doesn't like using them due to 72 hour scheduling requirements.  [facility] requires a week reservation.  He will look into a cab or Lyft paid for by himself.

## 2025-07-08 ENCOUNTER — PATIENT MESSAGE (OUTPATIENT)
Dept: HEMATOLOGY/ONCOLOGY | Facility: CLINIC | Age: 65
End: 2025-07-08
Payer: MEDICARE

## 2025-07-08 ENCOUNTER — INFUSION (OUTPATIENT)
Dept: INFUSION THERAPY | Facility: HOSPITAL | Age: 65
End: 2025-07-08
Attending: INTERNAL MEDICINE
Payer: MEDICARE

## 2025-07-08 VITALS
TEMPERATURE: 97 F | WEIGHT: 119.5 LBS | BODY MASS INDEX: 17.7 KG/M2 | HEIGHT: 69 IN | OXYGEN SATURATION: 98 % | HEART RATE: 89 BPM | SYSTOLIC BLOOD PRESSURE: 112 MMHG | RESPIRATION RATE: 18 BRPM | DIASTOLIC BLOOD PRESSURE: 71 MMHG

## 2025-07-08 DIAGNOSIS — C34.31 MALIGNANT NEOPLASM OF LOWER LOBE OF RIGHT LUNG: Primary | ICD-10-CM

## 2025-07-08 PROCEDURE — 96417 CHEMO IV INFUS EACH ADDL SEQ: CPT

## 2025-07-08 PROCEDURE — 63600175 PHARM REV CODE 636 W HCPCS: Performed by: INTERNAL MEDICINE

## 2025-07-08 PROCEDURE — 96375 TX/PRO/DX INJ NEW DRUG ADDON: CPT

## 2025-07-08 PROCEDURE — 96367 TX/PROPH/DG ADDL SEQ IV INF: CPT

## 2025-07-08 PROCEDURE — 25000003 PHARM REV CODE 250: Performed by: INTERNAL MEDICINE

## 2025-07-08 PROCEDURE — 96413 CHEMO IV INFUSION 1 HR: CPT

## 2025-07-08 RX ORDER — EPINEPHRINE 0.3 MG/.3ML
0.3 INJECTION SUBCUTANEOUS ONCE AS NEEDED
Status: DISCONTINUED | OUTPATIENT
Start: 2025-07-08 | End: 2025-07-08 | Stop reason: HOSPADM

## 2025-07-08 RX ORDER — PROCHLORPERAZINE EDISYLATE 5 MG/ML
5 INJECTION INTRAMUSCULAR; INTRAVENOUS ONCE AS NEEDED
Status: DISCONTINUED | OUTPATIENT
Start: 2025-07-08 | End: 2025-07-08 | Stop reason: HOSPADM

## 2025-07-08 RX ORDER — SODIUM CHLORIDE 0.9 % (FLUSH) 0.9 %
10 SYRINGE (ML) INJECTION
Status: DISCONTINUED | OUTPATIENT
Start: 2025-07-08 | End: 2025-07-08 | Stop reason: HOSPADM

## 2025-07-08 RX ORDER — DIPHENHYDRAMINE HYDROCHLORIDE 50 MG/ML
50 INJECTION, SOLUTION INTRAMUSCULAR; INTRAVENOUS ONCE AS NEEDED
Status: DISCONTINUED | OUTPATIENT
Start: 2025-07-08 | End: 2025-07-08 | Stop reason: HOSPADM

## 2025-07-08 RX ORDER — FAMOTIDINE 10 MG/ML
20 INJECTION, SOLUTION INTRAVENOUS
Status: COMPLETED | OUTPATIENT
Start: 2025-07-08 | End: 2025-07-08

## 2025-07-08 RX ADMIN — FAMOTIDINE 20 MG: 10 INJECTION INTRAVENOUS at 07:07

## 2025-07-08 RX ADMIN — CARBOPLATIN 270 MG: 450 INJECTION, SOLUTION INTRAVENOUS at 09:07

## 2025-07-08 RX ADMIN — PACLITAXEL 72 MG: 6 INJECTION INTRAVENOUS at 08:07

## 2025-07-08 RX ADMIN — PALONOSETRON HYDROCHLORIDE 0.25 MG: 0.25 INJECTION, SOLUTION INTRAVENOUS at 07:07

## 2025-07-08 RX ADMIN — SODIUM CHLORIDE 50 MG: 9 INJECTION, SOLUTION INTRAVENOUS at 07:07

## 2025-07-08 RX ADMIN — SODIUM CHLORIDE: 9 INJECTION, SOLUTION INTRAVENOUS at 07:07

## 2025-07-08 NOTE — PLAN OF CARE
Problem: Fatigue  Goal: Improved Activity Tolerance  Outcome: Progressing  Intervention: Promote Improved Energy  Flowsheets (Taken 7/8/2025 8220)  Fatigue Management: frequent rest breaks encouraged  Activity Management: Ambulated -L4  Environmental Support: rest periods encouraged

## 2025-07-14 ENCOUNTER — LAB VISIT (OUTPATIENT)
Dept: LAB | Facility: HOSPITAL | Age: 65
End: 2025-07-14
Payer: MEDICARE

## 2025-07-14 ENCOUNTER — OFFICE VISIT (OUTPATIENT)
Dept: HEMATOLOGY/ONCOLOGY | Facility: CLINIC | Age: 65
End: 2025-07-14
Payer: MEDICARE

## 2025-07-14 ENCOUNTER — HOSPITAL ENCOUNTER (INPATIENT)
Facility: HOSPITAL | Age: 65
LOS: 1 days | Discharge: LEFT BEFORE DISCHARGE | DRG: 194 | End: 2025-07-15
Attending: STUDENT IN AN ORGANIZED HEALTH CARE EDUCATION/TRAINING PROGRAM | Admitting: FAMILY MEDICINE
Payer: MEDICARE

## 2025-07-14 VITALS
BODY MASS INDEX: 17.4 KG/M2 | DIASTOLIC BLOOD PRESSURE: 68 MMHG | TEMPERATURE: 100 F | HEART RATE: 118 BPM | WEIGHT: 117.5 LBS | RESPIRATION RATE: 18 BRPM | SYSTOLIC BLOOD PRESSURE: 111 MMHG | OXYGEN SATURATION: 94 % | HEIGHT: 69 IN

## 2025-07-14 DIAGNOSIS — R63.4 LOSS OF WEIGHT: ICD-10-CM

## 2025-07-14 DIAGNOSIS — C34.31 MALIGNANT NEOPLASM OF LOWER LOBE OF RIGHT LUNG: Primary | ICD-10-CM

## 2025-07-14 DIAGNOSIS — R05.9 COUGH IN ADULT: ICD-10-CM

## 2025-07-14 DIAGNOSIS — R50.9 FEVER: ICD-10-CM

## 2025-07-14 DIAGNOSIS — E22.2 SIADH (SYNDROME OF INAPPROPRIATE ADH PRODUCTION): ICD-10-CM

## 2025-07-14 DIAGNOSIS — R07.9 CHEST PAIN: ICD-10-CM

## 2025-07-14 DIAGNOSIS — C34.31 MALIGNANT NEOPLASM OF LOWER LOBE OF RIGHT LUNG: ICD-10-CM

## 2025-07-14 DIAGNOSIS — Z71.3 NUTRITIONAL COUNSELING: ICD-10-CM

## 2025-07-14 DIAGNOSIS — R06.02 SOB (SHORTNESS OF BREATH): ICD-10-CM

## 2025-07-14 DIAGNOSIS — R00.0 TACHYCARDIA: ICD-10-CM

## 2025-07-14 DIAGNOSIS — C34.90 MALIGNANT NEOPLASM OF LUNG, UNSPECIFIED LATERALITY, UNSPECIFIED PART OF LUNG: ICD-10-CM

## 2025-07-14 PROBLEM — E87.1 HYPONATREMIA: Status: ACTIVE | Noted: 2025-07-14

## 2025-07-14 PROBLEM — J90 PLEURAL EFFUSION ON RIGHT: Status: ACTIVE | Noted: 2025-07-14

## 2025-07-14 LAB
ABSOLUTE EOSINOPHIL (SMH): 0.1 K/UL
ABSOLUTE EOSINOPHIL (SMH): 0.11 K/UL
ABSOLUTE MONOCYTE (SMH): 0.61 K/UL (ref 0.3–1)
ABSOLUTE MONOCYTE (SMH): 0.69 K/UL (ref 0.3–1)
ABSOLUTE NEUTROPHIL COUNT (SMH): 4.7 K/UL (ref 1.8–7.7)
ABSOLUTE NEUTROPHIL COUNT (SMH): 5.7 K/UL (ref 1.8–7.7)
ALBUMIN SERPL-MCNC: 2.9 G/DL (ref 3.5–5.2)
ALBUMIN SERPL-MCNC: 3 G/DL (ref 3.5–5.2)
ALP SERPL-CCNC: 126 UNIT/L (ref 55–135)
ALP SERPL-CCNC: 131 UNIT/L (ref 55–135)
ALT SERPL-CCNC: 37 UNIT/L (ref 10–44)
ALT SERPL-CCNC: 39 UNIT/L (ref 10–44)
ANION GAP (SMH): 10 MMOL/L (ref 8–16)
ANION GAP (SMH): 8 MMOL/L (ref 8–16)
AST SERPL-CCNC: 26 UNIT/L (ref 10–40)
AST SERPL-CCNC: 32 UNIT/L (ref 10–40)
BASOPHILS # BLD AUTO: 0.03 K/UL
BASOPHILS # BLD AUTO: 0.04 K/UL
BASOPHILS NFR BLD AUTO: 0.4 %
BASOPHILS NFR BLD AUTO: 0.7 %
BILIRUB SERPL-MCNC: 0.3 MG/DL (ref 0.1–1)
BILIRUB SERPL-MCNC: 0.3 MG/DL (ref 0.1–1)
BUN SERPL-MCNC: 16 MG/DL (ref 8–23)
BUN SERPL-MCNC: 18 MG/DL (ref 8–23)
CALCIUM SERPL-MCNC: 8.5 MG/DL (ref 8.7–10.5)
CALCIUM SERPL-MCNC: 8.8 MG/DL (ref 8.7–10.5)
CHLORIDE SERPL-SCNC: 96 MMOL/L (ref 95–110)
CHLORIDE SERPL-SCNC: 96 MMOL/L (ref 95–110)
CO2 SERPL-SCNC: 23 MMOL/L (ref 23–29)
CO2 SERPL-SCNC: 25 MMOL/L (ref 23–29)
CREAT SERPL-MCNC: 0.5 MG/DL (ref 0.5–1.4)
CREAT SERPL-MCNC: 0.5 MG/DL (ref 0.5–1.4)
ERYTHROCYTE [DISTWIDTH] IN BLOOD BY AUTOMATED COUNT: 14.2 % (ref 11.5–14.5)
ERYTHROCYTE [DISTWIDTH] IN BLOOD BY AUTOMATED COUNT: 14.3 % (ref 11.5–14.5)
GFR SERPLBLD CREATININE-BSD FMLA CKD-EPI: >60 ML/MIN/1.73/M2
GFR SERPLBLD CREATININE-BSD FMLA CKD-EPI: >60 ML/MIN/1.73/M2
GLUCOSE SERPL-MCNC: 108 MG/DL (ref 70–110)
GLUCOSE SERPL-MCNC: 138 MG/DL (ref 70–110)
HCT VFR BLD AUTO: 26.5 % (ref 40–54)
HCT VFR BLD AUTO: 30.9 % (ref 40–54)
HGB BLD-MCNC: 10 GM/DL (ref 14–18)
HGB BLD-MCNC: 8.7 GM/DL (ref 14–18)
IMM GRANULOCYTES # BLD AUTO: 0.05 K/UL (ref 0–0.04)
IMM GRANULOCYTES # BLD AUTO: 0.07 K/UL (ref 0–0.04)
IMM GRANULOCYTES NFR BLD AUTO: 0.8 % (ref 0–0.5)
IMM GRANULOCYTES NFR BLD AUTO: 1 % (ref 0–0.5)
INFLUENZA A MOLECULAR (OHS): NEGATIVE
INFLUENZA B MOLECULAR (OHS): NEGATIVE
INR PPP: 1 (ref 0.8–1.2)
LYMPHOCYTES # BLD AUTO: 0.67 K/UL (ref 1–4.8)
LYMPHOCYTES # BLD AUTO: 0.68 K/UL (ref 1–4.8)
MAGNESIUM SERPL-MCNC: 1.9 MG/DL (ref 1.6–2.6)
MAGNESIUM SERPL-MCNC: 2 MG/DL (ref 1.6–2.6)
MAGNESIUM SERPL-MCNC: 2 MG/DL (ref 1.6–2.6)
MCH RBC QN AUTO: 26.5 PG (ref 27–31)
MCH RBC QN AUTO: 27.4 PG (ref 27–31)
MCHC RBC AUTO-ENTMCNC: 32.4 G/DL (ref 32–36)
MCHC RBC AUTO-ENTMCNC: 32.8 G/DL (ref 32–36)
MCV RBC AUTO: 81 FL (ref 82–98)
MCV RBC AUTO: 85 FL (ref 82–98)
NUCLEATED RBC (/100WBC) (SMH): 0 /100 WBC
NUCLEATED RBC (/100WBC) (SMH): 0 /100 WBC
PHOSPHATE SERPL-MCNC: 3.9 MG/DL (ref 2.7–4.5)
PLATELET # BLD AUTO: 513 K/UL (ref 150–450)
PLATELET # BLD AUTO: 598 K/UL (ref 150–450)
PMV BLD AUTO: 8.9 FL (ref 9.2–12.9)
PMV BLD AUTO: 8.9 FL (ref 9.2–12.9)
POTASSIUM SERPL-SCNC: 3.9 MMOL/L (ref 3.5–5.1)
POTASSIUM SERPL-SCNC: 4.5 MMOL/L (ref 3.5–5.1)
PROT SERPL-MCNC: 6.5 GM/DL (ref 6–8.4)
PROT SERPL-MCNC: 6.9 GM/DL (ref 6–8.4)
PROTHROMBIN TIME: 11.4 SECONDS (ref 9–12.5)
RBC # BLD AUTO: 3.28 M/UL (ref 4.6–6.2)
RBC # BLD AUTO: 3.65 M/UL (ref 4.6–6.2)
RELATIVE EOSINOPHIL (SMH): 1.5 % (ref 0–8)
RELATIVE EOSINOPHIL (SMH): 1.6 % (ref 0–8)
RELATIVE LYMPHOCYTE (SMH): 11.1 % (ref 18–48)
RELATIVE LYMPHOCYTE (SMH): 9.2 % (ref 18–48)
RELATIVE MONOCYTE (SMH): 9.5 % (ref 4–15)
RELATIVE MONOCYTE (SMH): 9.9 % (ref 4–15)
RELATIVE NEUTROPHIL (SMH): 75.9 % (ref 38–73)
RELATIVE NEUTROPHIL (SMH): 78.4 % (ref 38–73)
SARS-COV-2 RDRP RESP QL NAA+PROBE: NEGATIVE
SODIUM SERPL-SCNC: 129 MMOL/L (ref 136–145)
SODIUM SERPL-SCNC: 129 MMOL/L (ref 136–145)
WBC # BLD AUTO: 6.15 K/UL (ref 3.9–12.7)
WBC # BLD AUTO: 7.26 K/UL (ref 3.9–12.7)

## 2025-07-14 PROCEDURE — 80053 COMPREHEN METABOLIC PANEL: CPT | Performed by: STUDENT IN AN ORGANIZED HEALTH CARE EDUCATION/TRAINING PROGRAM

## 2025-07-14 PROCEDURE — 83735 ASSAY OF MAGNESIUM: CPT

## 2025-07-14 PROCEDURE — 83735 ASSAY OF MAGNESIUM: CPT | Performed by: STUDENT IN AN ORGANIZED HEALTH CARE EDUCATION/TRAINING PROGRAM

## 2025-07-14 PROCEDURE — 1126F AMNT PAIN NOTED NONE PRSNT: CPT | Mod: CPTII,S$GLB,, | Performed by: INTERNAL MEDICINE

## 2025-07-14 PROCEDURE — 1101F PT FALLS ASSESS-DOCD LE1/YR: CPT | Mod: CPTII,S$GLB,, | Performed by: INTERNAL MEDICINE

## 2025-07-14 PROCEDURE — 1159F MED LIST DOCD IN RCRD: CPT | Mod: CPTII,S$GLB,, | Performed by: INTERNAL MEDICINE

## 2025-07-14 PROCEDURE — 36415 COLL VENOUS BLD VENIPUNCTURE: CPT

## 2025-07-14 PROCEDURE — 99999 PR PBB SHADOW E&M-EST. PATIENT-LVL IV: CPT | Mod: PBBFAC,,, | Performed by: INTERNAL MEDICINE

## 2025-07-14 PROCEDURE — 25500020 PHARM REV CODE 255: Performed by: STUDENT IN AN ORGANIZED HEALTH CARE EDUCATION/TRAINING PROGRAM

## 2025-07-14 PROCEDURE — 36415 COLL VENOUS BLD VENIPUNCTURE: CPT | Performed by: INTERNAL MEDICINE

## 2025-07-14 PROCEDURE — 25000003 PHARM REV CODE 250: Performed by: INTERNAL MEDICINE

## 2025-07-14 PROCEDURE — 11000001 HC ACUTE MED/SURG PRIVATE ROOM

## 2025-07-14 PROCEDURE — 93010 ELECTROCARDIOGRAM REPORT: CPT | Mod: ,,, | Performed by: INTERNAL MEDICINE

## 2025-07-14 PROCEDURE — 85025 COMPLETE CBC W/AUTO DIFF WBC: CPT | Performed by: STUDENT IN AN ORGANIZED HEALTH CARE EDUCATION/TRAINING PROGRAM

## 2025-07-14 PROCEDURE — 63600175 PHARM REV CODE 636 W HCPCS: Performed by: INTERNAL MEDICINE

## 2025-07-14 PROCEDURE — 99215 OFFICE O/P EST HI 40 MIN: CPT | Mod: S$GLB,,, | Performed by: INTERNAL MEDICINE

## 2025-07-14 PROCEDURE — U0002 COVID-19 LAB TEST NON-CDC: HCPCS | Performed by: STUDENT IN AN ORGANIZED HEALTH CARE EDUCATION/TRAINING PROGRAM

## 2025-07-14 PROCEDURE — 85025 COMPLETE CBC W/AUTO DIFF WBC: CPT

## 2025-07-14 PROCEDURE — 3074F SYST BP LT 130 MM HG: CPT | Mod: CPTII,S$GLB,, | Performed by: INTERNAL MEDICINE

## 2025-07-14 PROCEDURE — 99285 EMERGENCY DEPT VISIT HI MDM: CPT | Mod: 25

## 2025-07-14 PROCEDURE — 4010F ACE/ARB THERAPY RXD/TAKEN: CPT | Mod: CPTII,S$GLB,, | Performed by: INTERNAL MEDICINE

## 2025-07-14 PROCEDURE — 84295 ASSAY OF SERUM SODIUM: CPT

## 2025-07-14 PROCEDURE — 85610 PROTHROMBIN TIME: CPT | Performed by: STUDENT IN AN ORGANIZED HEALTH CARE EDUCATION/TRAINING PROGRAM

## 2025-07-14 PROCEDURE — 87502 INFLUENZA DNA AMP PROBE: CPT | Performed by: STUDENT IN AN ORGANIZED HEALTH CARE EDUCATION/TRAINING PROGRAM

## 2025-07-14 PROCEDURE — 93005 ELECTROCARDIOGRAM TRACING: CPT | Performed by: INTERNAL MEDICINE

## 2025-07-14 PROCEDURE — 3288F FALL RISK ASSESSMENT DOCD: CPT | Mod: CPTII,S$GLB,, | Performed by: INTERNAL MEDICINE

## 2025-07-14 PROCEDURE — 87040 BLOOD CULTURE FOR BACTERIA: CPT | Performed by: INTERNAL MEDICINE

## 2025-07-14 PROCEDURE — G2211 COMPLEX E/M VISIT ADD ON: HCPCS | Mod: S$GLB,,, | Performed by: INTERNAL MEDICINE

## 2025-07-14 PROCEDURE — 3078F DIAST BP <80 MM HG: CPT | Mod: CPTII,S$GLB,, | Performed by: INTERNAL MEDICINE

## 2025-07-14 PROCEDURE — 3008F BODY MASS INDEX DOCD: CPT | Mod: CPTII,S$GLB,, | Performed by: INTERNAL MEDICINE

## 2025-07-14 PROCEDURE — 84100 ASSAY OF PHOSPHORUS: CPT | Performed by: STUDENT IN AN ORGANIZED HEALTH CARE EDUCATION/TRAINING PROGRAM

## 2025-07-14 RX ORDER — CEFEPIME HYDROCHLORIDE 2 G/1
2 INJECTION, POWDER, FOR SOLUTION INTRAVENOUS
Status: DISCONTINUED | OUTPATIENT
Start: 2025-07-14 | End: 2025-07-15 | Stop reason: HOSPADM

## 2025-07-14 RX ORDER — ACETAMINOPHEN 325 MG/1
650 TABLET ORAL EVERY 4 HOURS PRN
Status: DISCONTINUED | OUTPATIENT
Start: 2025-07-14 | End: 2025-07-15 | Stop reason: HOSPADM

## 2025-07-14 RX ORDER — IBUPROFEN 200 MG
1 TABLET ORAL DAILY
Status: DISCONTINUED | OUTPATIENT
Start: 2025-07-15 | End: 2025-07-15 | Stop reason: HOSPADM

## 2025-07-14 RX ORDER — AZITHROMYCIN 250 MG/1
500 TABLET, FILM COATED ORAL DAILY
Status: DISCONTINUED | OUTPATIENT
Start: 2025-07-15 | End: 2025-07-15 | Stop reason: HOSPADM

## 2025-07-14 RX ORDER — ALUMINUM HYDROXIDE, MAGNESIUM HYDROXIDE, AND SIMETHICONE 1200; 120; 1200 MG/30ML; MG/30ML; MG/30ML
30 SUSPENSION ORAL 4 TIMES DAILY PRN
Status: DISCONTINUED | OUTPATIENT
Start: 2025-07-14 | End: 2025-07-15 | Stop reason: HOSPADM

## 2025-07-14 RX ORDER — SODIUM,POTASSIUM PHOSPHATES 280-250MG
2 POWDER IN PACKET (EA) ORAL
Status: DISCONTINUED | OUTPATIENT
Start: 2025-07-14 | End: 2025-07-15 | Stop reason: HOSPADM

## 2025-07-14 RX ORDER — LANOLIN ALCOHOL/MO/W.PET/CERES
800 CREAM (GRAM) TOPICAL
Status: DISCONTINUED | OUTPATIENT
Start: 2025-07-14 | End: 2025-07-15 | Stop reason: HOSPADM

## 2025-07-14 RX ORDER — HYDROCODONE BITARTRATE AND ACETAMINOPHEN 5; 325 MG/1; MG/1
1 TABLET ORAL EVERY 6 HOURS PRN
Refills: 0 | Status: DISCONTINUED | OUTPATIENT
Start: 2025-07-14 | End: 2025-07-15 | Stop reason: HOSPADM

## 2025-07-14 RX ORDER — TALC
6 POWDER (GRAM) TOPICAL NIGHTLY PRN
Status: DISCONTINUED | OUTPATIENT
Start: 2025-07-14 | End: 2025-07-15 | Stop reason: HOSPADM

## 2025-07-14 RX ORDER — ONDANSETRON HYDROCHLORIDE 2 MG/ML
4 INJECTION, SOLUTION INTRAVENOUS EVERY 6 HOURS PRN
Status: DISCONTINUED | OUTPATIENT
Start: 2025-07-14 | End: 2025-07-15 | Stop reason: HOSPADM

## 2025-07-14 RX ORDER — NALOXONE HCL 0.4 MG/ML
0.02 VIAL (ML) INJECTION
Status: DISCONTINUED | OUTPATIENT
Start: 2025-07-14 | End: 2025-07-15 | Stop reason: HOSPADM

## 2025-07-14 RX ORDER — ACETAMINOPHEN 325 MG/1
650 TABLET ORAL EVERY 8 HOURS PRN
Status: DISCONTINUED | OUTPATIENT
Start: 2025-07-14 | End: 2025-07-15 | Stop reason: HOSPADM

## 2025-07-14 RX ORDER — IPRATROPIUM BROMIDE AND ALBUTEROL SULFATE 2.5; .5 MG/3ML; MG/3ML
3 SOLUTION RESPIRATORY (INHALATION) EVERY 6 HOURS
Status: DISCONTINUED | OUTPATIENT
Start: 2025-07-15 | End: 2025-07-15 | Stop reason: HOSPADM

## 2025-07-14 RX ORDER — LEVOFLOXACIN 750 MG/1
750 TABLET, FILM COATED ORAL DAILY
Qty: 10 TABLET | Refills: 0 | Status: SHIPPED | OUTPATIENT
Start: 2025-07-14 | End: 2025-07-24

## 2025-07-14 RX ORDER — SODIUM CHLORIDE 1 G/1
1000 TABLET ORAL 2 TIMES DAILY
Status: DISCONTINUED | OUTPATIENT
Start: 2025-07-14 | End: 2025-07-15 | Stop reason: HOSPADM

## 2025-07-14 RX ORDER — PANTOPRAZOLE SODIUM 40 MG/1
40 TABLET, DELAYED RELEASE ORAL DAILY
Status: DISCONTINUED | OUTPATIENT
Start: 2025-07-15 | End: 2025-07-15 | Stop reason: HOSPADM

## 2025-07-14 RX ORDER — BUDESONIDE 0.5 MG/2ML
0.5 INHALANT ORAL EVERY 12 HOURS
Status: DISCONTINUED | OUTPATIENT
Start: 2025-07-15 | End: 2025-07-15 | Stop reason: HOSPADM

## 2025-07-14 RX ADMIN — VANCOMYCIN HYDROCHLORIDE 1000 MG: 1 INJECTION, POWDER, LYOPHILIZED, FOR SOLUTION INTRAVENOUS at 10:07

## 2025-07-14 RX ADMIN — CEFEPIME 2 G: 2 INJECTION, POWDER, FOR SOLUTION INTRAVENOUS at 10:07

## 2025-07-14 RX ADMIN — IOHEXOL 100 ML: 350 INJECTION, SOLUTION INTRAVENOUS at 09:07

## 2025-07-14 RX ADMIN — SODIUM CHLORIDE 1000 MG: 1 TABLET ORAL at 10:07

## 2025-07-14 NOTE — PROGRESS NOTES
HPI    64 years old male newly discovered squamous cell carcinoma of the right lung.  Active smoker.  PET scan shows no distant metastases.  MRI of the brain is negative for malignancy involvement.  Clinical staging T4 N1 M0.  Patient's saw Radiation Oncology already.  He is not a surgical candidate.  Planning on concurrent chemoradiation x6 weeks followed by immunotherapy consolidation.    Abnormal weight loss.      Past Medical History:   Diagnosis Date    Coronary artery disease     cardiac stents    Heart attack     Hepatitis C     Hypertension      Social History     Socioeconomic History    Marital status:    Tobacco Use    Smoking status: Every Day     Current packs/day: 0.50     Average packs/day: 2.0 packs/day for 40.5 years (80.8 ttl pk-yrs)     Types: Cigarettes     Start date: 1985    Tobacco comments:     Smoking on oxygen      7/1/25 pt instructed no smoking prior to sx   Substance and Sexual Activity    Alcohol use: Not Currently     Comment: quit 6/2025    Drug use: Not Currently     Social Drivers of Health     Financial Resource Strain: High Risk (4/26/2025)    Received from [facility] SDOH Screening     In the past year, have you been unable to get any of the following when you really needed them? choose all that apply.: Internet   Food Insecurity: High Risk (4/26/2025)    Received from [facility] SDOH Screening     In the past 2 months, did you or others you live with eat smaller meals or skip meals because you didn't have money for food?: Yes   Transportation Needs: High Risk (4/26/2025)    Received from [facility] SDOH Screening     Has lack of transportation kept you from medical appointments, meetings, work or from getting things needed for daily living? choose all that apply.: Yes, it has kept me from medical appointments or from getting my medications     Has lack of transportation kept you from medical appointments, meetings, work or from  getting things needed for daily living? choose all that apply.: Yes, it has kept me from non-medical meetings, appointments, work or from getting things that i need   Physical Activity: Insufficiently Active (10/17/2024)    Received from [facility]    Exercise Vital Sign     Days of Exercise per Week: 5 days     Minutes of Exercise per Session: 20 min   Stress: Patient Declined (10/18/2024)    Received from [facility] of Occupational Health - Occupational Stress Questionnaire     Feeling of Stress : Patient declined   Housing Stability: Low Risk  (10/17/2024)    Received from [facility]    Housing Stability Vital Sign     Unable to Pay for Housing in the Last Year: No     Number of Times Moved in the Last Year: 1     Homeless in the Last Year: No         Subjective      Review of Systems   Constitutional: Negative for appetite change, fatigue and unexpected weight change.   HENT: Negative for mouth sores.   Eyes: Negative for visual disturbance.   Respiratory: Negative for cough and shortness of breath.   Cardiovascular: Negative for chest pain.   Gastrointestinal: Negative for diarrhea.   Genitourinary: Negative for frequency.   Musculoskeletal: Negative for back pain.   Skin: Negative for rash.   Neurological: Negative for headaches.   Hematological: Negative for adenopathy.   Psychiatric/Behavioral: The patient is not nervous/anxious.   All other systems reviewed and are negative.     Objective    Physical Exam   Vitals:    07/14/25 1325   BP: 111/68   Pulse: (!) 118   Resp: 18   Temp: (!) 101.5 °F (38.6 °C)       Constitutional: patient is oriented to person, place, and time. patient appears well-developed and well-nourished. No distress.   HENT:   Right Ear: External ear normal.   Left Ear: External ear normal.   Nose: Nose normal.   Mouth/Throat: Oropharynx is clear and moist. No  oropharyngeal exudate.   Teeth, gums and lips are normal   No sinus tenderness   Palate, tongue, posterior pharynx are normal   Eyes: Conjunctivae and lids are normal.   Neck: Trachea normal and normal range of motion. No thyromegaly   Cardiovascular: Normal rate, regular rhythm, normal heart sounds, intact distal pulses and normal pulses.   No murmur heard.   No edema, no tenderness in the extremities.   Pulmonary/Chest: Effort normal and breath sounds normal. No accessory muscle usage. patient has no wheezes..   Abdominal: Soft. Normal appearance and bowel sounds are normal. patient exhibits no distension and no mass. There is no hepatosplenomegaly. There is no tenderness.   Musculoskeletal: Normal range of motion.   Gait is normal   No clubbing, cyanosis     Lymphadenopathy:   Head (right side): No submental and no submandibular adenopathy present.   Head (left side): No submental and no submandibular adenopathy present.   patient has no cervical adenopathy.   Right: No supraclavicular adenopathy present.   Left: No supraclavicular adenopathy present.   Neurological: patient is alert and oriented to person, place, and time. patient has normal strength and normal reflexes. No sensory deficit. Gait normal.   Skin: Skin is warm, dry and intact. No bruising, no lesion and no rash noted. No cyanosis. Nails show no clubbing.   No lesions   Psychiatric: patient has a normal mood and affect. patient speech is normal and behavior is normal. Judgment normal. Cognition and memory are normal.   Vitals reviewed.     Lab Results   Component Value Date    WBC 10.98 07/07/2025    HGB 10.1 (L) 07/07/2025    HCT 31.5 (L) 07/07/2025    MCV 83 07/07/2025     (H) 07/07/2025       CMP  Sodium   Date Value Ref Range Status   07/07/2025 131 (L) 136 - 145 mmol/L Final     Potassium   Date Value Ref Range Status   07/07/2025 4.0 3.5 - 5.1 mmol/L Final     Chloride   Date Value Ref Range Status   07/07/2025 96 95 - 110 mmol/L Final      CO2   Date Value Ref Range Status   07/07/2025 27 23 - 29 mmol/L Final     Glucose   Date Value Ref Range Status   07/07/2025 116 (H) 70 - 110 mg/dL Final     BUN   Date Value Ref Range Status   07/07/2025 17 8 - 23 mg/dL Final     Creatinine   Date Value Ref Range Status   07/07/2025 0.5 0.5 - 1.4 mg/dL Final     Calcium   Date Value Ref Range Status   07/07/2025 8.9 8.7 - 10.5 mg/dL Final     Protein Total   Date Value Ref Range Status   07/07/2025 6.9 6.0 - 8.4 gm/dL Final     Albumin   Date Value Ref Range Status   07/07/2025 2.9 (L) 3.5 - 5.2 g/dL Final     Bilirubin Total   Date Value Ref Range Status   07/07/2025 0.3 0.1 - 1.0 mg/dL Final     Comment:     For infants and newborns, interpretation of results should be based   on gestational age, weight and in agreement with clinical   observations.    Premature Infant recommended reference ranges:   0-24 hours:  <8.0 mg/dL   24-48 hours: <12.0 mg/dL   3-5 days:    <15.0 mg/dL   6-29 days:   <15.0 mg/dL     ALP   Date Value Ref Range Status   07/07/2025 111 55 - 135 unit/L Final     AST   Date Value Ref Range Status   07/07/2025 18 10 - 40 unit/L Final     ALT   Date Value Ref Range Status   07/07/2025 32 10 - 44 unit/L Final     Anion Gap   Date Value Ref Range Status   07/07/2025 8 8 - 16 mmol/L Final     eGFR   Date Value Ref Range Status   07/07/2025 >60 >60 mL/min/1.73/m2 Final   02/26/2025 >60.0 >60 mL/min/1.73 m^2 Final       PEtCT  Impression:     1.  Large centrally necrotic mass in the right lung extending from the hilum into the right lower lobe compatible with malignancy, noting this appears to opacify/obstruct the right mainstem bronchus, with postobstructive atelectasis/consolidation in the right lower lobe.     2.  Mild faint scattered tree-in-bud opacity in the adjacent right lung suggesting a combination of mucous, secretion, aspiration and or small airways disease.     3.  No PET-CT avid distant metastatic disease.      Path bronchial  wash  LUNG, RIGHT, BRONCHIAL WASH:     --POSITIVE FOR SQUAMOUS CELL CARCINOMA        Assessment    Squamous cell carcinoma of the right lung.  T4 N1 M0.  Stage III a.  ECOG score 1.  Abnormal weight loss secondary to cancer.  Active tobacco use.    Nonsurgical candidate    Plan for concurrent chemoradiation involving carbo Taxol paclitaxel weekly with radiation x6 weeks.  This will be followed by consolidation of immunotherapy.      Abnormal weight loss secondary to malignancy.  Starting on Megace and referred to nutritionist.    Anemia in the setting of malignancy    Leukocytosis reactive - active     Thrombocytosis reactive -active     Hyponatremia SIADH plus dehydration - start on salt tablet 1000 mg b.i.d.+ some NS0.9 hydration with a some of improved. continue salt tablets but increase to TID.  Treating underlying problems malignancy    Low grade Temp? Patient experiencing coughing rhinitis and low-grade fever.  CBC from 07/07/2025 appear to be normal.  Do not have blood work from today yet.   Urgent care ruled out COVID if negative and blood work appear to be normal can proceed for with therapy otherwise delay the treatment times one-week.  Empirically start on Levaquin 750 mg daily    RTC 1 week with lab for treatment with lab       Plan    There are no diagnoses linked to this encounter.

## 2025-07-14 NOTE — ED PROVIDER NOTES
"Encounter Date: 7/14/2025       History     Chief Complaint   Patient presents with    Fever     Fever started today.  "Thinks I have COVID"  pt reports cough - chronic.       HPI    Vlad Gonzales is a 64 year old male patient with PMH of HTN, MI, CAD s/p cardiac stents, right SCC lung CA who presents to the ED with fever. Patient was at his outpatient oncology appointment earlier this afternoon when he was found to be febrile to 101.5 F. Patient reports cough and congestion along with increased SOB. Patient reports that he is normally on 2L O2, but has needed to increase to 4L to help with his SOB. Increasingly dyspneic on exertion over the past few days. Denies any subjective fevers or chills at home. Reporting increased fatigue. Denies any chest pain, palpitations. No GI or urinary symptoms reported. Patient is scheduled for first chemotherapy infusion tomorrow.     Review of patient's allergies indicates:  No Known Allergies  Past Medical History:   Diagnosis Date    Coronary artery disease     cardiac stents    Heart attack     Hepatitis C     Hypertension      Past Surgical History:   Procedure Laterality Date    ANGIOGRAM, CORONARY, WITH LEFT HEART CATHETERIZATION  2013    stent    ENDOBRONCHIAL ULTRASOUND N/A 06/18/2025    Procedure: ENDOBRONCHIAL ULTRASOUND (EBUS);  Surgeon: Luis M Sandoval MD;  Location: [facility];  Service: Pulmonary;  Laterality: N/A;     Family History   Problem Relation Name Age of Onset    Lung cancer Sister      Cancer Sister      Cancer Paternal Uncle      Skin cancer Paternal Uncle      Cancer Maternal Grandmother      Lung cancer Maternal Grandfather      Cancer Maternal Grandfather       Social History[1]  Review of Systems   Constitutional:  Positive for chills, fatigue and fever.   HENT:  Positive for congestion. Negative for rhinorrhea and sore throat.    Respiratory:  Positive for cough and shortness of breath.    Cardiovascular:  Negative for chest pain, palpitations " and leg swelling.   Gastrointestinal:  Negative for abdominal pain, constipation, diarrhea, nausea and vomiting.   Genitourinary:  Negative for difficulty urinating.   Musculoskeletal:  Negative for back pain, myalgias and neck pain.   Skin:  Negative for rash.   Neurological:  Negative for dizziness, syncope, weakness, light-headedness, numbness and headaches.     Physical Exam     Initial Vitals [07/14/25 1652]   BP Pulse Resp Temp SpO2   101/61 (!) 120 20 99.1 °F (37.3 °C) (!) 94 %      MAP       --         Physical Exam    Nursing note and vitals reviewed.  Constitutional: Nasal cannula in place.   HENT:   Head: Normocephalic and atraumatic.   Right Ear: External ear normal.   Left Ear: External ear normal.   Nose: Nose normal. Mouth/Throat: Oropharynx is clear and moist.   Eyes: Conjunctivae and EOM are normal. Right eye exhibits no discharge. Left eye exhibits no discharge. No scleral icterus.   Neck: Neck supple.   Normal range of motion.  Cardiovascular:  Normal rate, regular rhythm, normal heart sounds and intact distal pulses.           Pulmonary/Chest: Effort normal. He exhibits no tenderness.   Course breath sounds on right.    Abdominal: Abdomen is soft. He exhibits no distension. There is no abdominal tenderness. There is no rebound and no guarding.   Musculoskeletal:         General: Normal range of motion.      Cervical back: Normal range of motion and neck supple.     Neurological: He is alert and oriented to person, place, and time. GCS score is 15. GCS eye subscore is 4. GCS verbal subscore is 5. GCS motor subscore is 6.   Skin: Skin is warm and dry.       ED Course   Procedures  Labs Reviewed   COMPREHENSIVE METABOLIC PANEL - Abnormal       Result Value    Sodium 129 (*)     Potassium 4.5      Chloride 96      CO2 25      Glucose 108      BUN 16      Creatinine 0.5      Calcium 8.5 (*)     Protein Total 6.5      Albumin 2.9 (*)     Bilirubin Total 0.3            AST 32      ALT 39       Anion Gap 8      eGFR >60     CBC WITH DIFFERENTIAL - Abnormal    WBC 7.26      RBC 3.28 (*)     Hgb 8.7 (*)     Hct 26.5 (*)     MCV 81 (*)     MCH 26.5 (*)     MCHC 32.8      RDW 14.3      Platelet Count 513 (*)     MPV 8.9 (*)     Nucleated RBC 0      Neut % 78.4 (*)     Lymph % 9.2 (*)     Mono % 9.5      Eos % 1.5      Basophil % 0.4      Imm Grans % 1.0 (*)     Neut # 5.7      Lymph # 0.67 (*)     Mono # 0.69      Eos # 0.11      Baso # 0.03      Imm Grans # 0.07 (*)    INFLUENZA A & B BY MOLECULAR - Normal    INFLUENZA A MOLECULAR Negative      INFLUENZA B MOLECULAR  Negative     MAGNESIUM - Normal    Magnesium 2.0     PROTIME-INR - Normal    PT 11.4      INR 1.0     SARS-COV-2 RNA AMPLIFICATION, QUAL - Normal    SARS COV-2 Molecular Negative     MAGNESIUM - Normal    Magnesium 2.0     PHOSPHORUS - Normal    Phosphorus Level 3.9     CBC W/ AUTO DIFFERENTIAL    Narrative:     The following orders were created for panel order CBC auto differential.  Procedure                               Abnormality         Status                     ---------                               -----------         ------                     CBC with Differential[[id number]]       Abnormal            Final result                 Please view results for these tests on the individual orders.        ECG Results              EKG 12-lead (In process)        Collection Time Result Time QRS Duration OHS QTC Calculation    07/14/25 18:17:26 07/15/25 06:03:35 88 414                     In process by Interface, Lab In [facility] (07/15/25 06:03:43)                   Narrative:    Test Reason : R00.0,    Vent. Rate : 102 BPM     Atrial Rate : 102 BPM     P-R Int : 112 ms          QRS Dur :  88 ms      QT Int : 318 ms       P-R-T Axes :  62  69  59 degrees    QTcB Int : 414 ms    Sinus tachycardia  Otherwise normal ECG  When compared with ECG of 16-Jun-2025 15:13,  No significant change was found    Referred By: AAAREFERRAL SELF            Confirmed By:                                      EKG 12-lead (Final result)  Result time 07/15/25 11:32:11      Final result by Unknown User (07/15/25 11:32:11)                                      Imaging Results              CT Chest With Contrast (Final result)  Result time 07/14/25 23:35:40      Final result by Willi Esquivel MD (07/14/25 23:35:40)                   Impression:      1. Near complete consolidation of the right lung with occlusion of the right mainstem bronchus and a large cavitary lesion involving the right lower lobe with measurements as above.  2. Small to moderate right pleural effusion.      Electronically signed by: Willi Esquivel  Date:    07/14/2025  Time:    23:35               Narrative:    EXAMINATION:  CT CHEST WITH CONTRAST    CLINICAL HISTORY:  Pleural effusion, malignancy suspected;    TECHNIQUE:  Low dose axial images, sagittal and coronal reformations were obtained from the thoracic inlet to the lung bases following the IV administration of 100 mL of Omnipaque 350.    COMPARISON:  NM PET CT from 06/03/2025.    FINDINGS:  Base of Neck: No significant abnormality.    Lungs/Pleura: There is near complete consolidation of the right lung.  The right mainstem bronchus is occluded.  A cavitary lesion is present involving the right lower lobe measuring 11.4 x 11.3 x 7.7 cm.  An overall small to moderate right pleural effusion is present.  A few blebs and bullae are present involving the left upper lobe superiorly and medially.  Right hilar calcifications and calcifications within the cavitary lesion are noted.    Aorta: The thoracic aorta is normal in diameter.    Heart: The heart is normal in size.  Atherosclerotic calcification is present involving the coronary arteries.  No pericardial effusion.    Lymph nodes: No evidence of lymphadenopathy involving the chest.    Esophagus: Normal.    Upper Abdomen: No evidence of acute abnormality involving the partially visualized upper  abdomen.  Calcified splenic granulomata are incidentally noted.    Bones: No evidence of acute osseous process.    Body wall: Unremarkable.    Other: None.                                       X-Ray Chest AP Portable (Final result)  Result time 07/14/25 18:34:19      Final result by Willi Esquivel MD (07/14/25 18:34:19)                   Impression:      Large right pleural effusion with near complete opacification of the right hemithorax.      Electronically signed by: Willi Esquivel  Date:    07/14/2025  Time:    18:34               Narrative:    EXAMINATION:  XR CHEST AP PORTABLE    CLINICAL HISTORY:  Shortness of breath    TECHNIQUE:  Single frontal view of the chest was performed.    COMPARISON:  Radiograph of the chest from 05/28/2025.    FINDINGS:  Heart size and pulmonary vasculature are within normal limits.  A large right pleural effusion is present with near complete opacification of the right hemithorax.  No evidence of pneumothorax.  No evidence of acute osseous process.                                       Medications   iohexoL (OMNIPAQUE 350) injection 100 mL (100 mLs Intravenous Given 7/14/25 2129)   vancomycin (VANCOCIN) 1,000 mg in 0.9% NaCl 250 mL IVPB (admixture device) (0 mg Intravenous Stopped 7/15/25 0003)     Medical Decision Making  Amount and/or Complexity of Data Reviewed  Labs: ordered.  Radiology: ordered.    Risk  Prescription drug management.  Decision regarding hospitalization.      DIANE Gonzales is a 64 year old male patient who presents to the ED with fever, increased SOB especially with exertion, congestion. Nasal cannula in place. Course breath sounds on right. Otherwise cardiopulmonary auscultation unremarkable Abdomen soft, non-tender, non-distended. No BLE edema, calf tenderness. Afebrile on presentation to the ED. Tachycardic to 120, hypotensive to 101/61. RR WNL, satting 94% on 4L. Presentation concerning for acute infection,high suspicion for viral  infection vs PNA given increased SOB along with cough and known lung SCC CA. Must consider other cardiopulmonary pathology including PE, pleural effusion, ACS, heart failure. Workup included CBC, CMP, Mg, coags, Covid and flu testing, EKG, and CXR. Patient without leukocytosis, WBC 7.26. H/H 8/7/26.5, decreased from baseline, Hgb 10.0 earlier in day. Hyponatremic to 129, at baseline. Otherwise CMP, Mg, P without gross abnormalities. Covid and flu negative. EKG demonstrates sinus tachycardia without evidence of acute ischemia, ST elevation. CT interval and Qtc WNL. CXR with large right pleural effusion with near complete opacification of the right hemithorax. CT of chest ordered to better characterize findings. Patient consulted to hospital medicine for admission.     Elvie Mitchell MD  07/15/2025 10:39 PM                                              Clinical Impression:  Final diagnoses:  [R00.0] Tachycardia  [R06.02] SOB (shortness of breath)  [R50.9] Fever          ED Disposition Condition    Admit                       [1]   Social History  Tobacco Use    Smoking status: Every Day     Current packs/day: 0.50     Average packs/day: 2.0 packs/day for 40.5 years (80.8 ttl pk-yrs)     Types: Cigarettes     Start date: 1985    Tobacco comments:     Smoking on oxygen      7/1/25 pt instructed no smoking prior to sx   Substance Use Topics    Alcohol use: Not Currently     Comment: quit 6/2025    Drug use: Not Currently        Elvie Mitchell MD  07/15/25 [id number]

## 2025-07-15 ENCOUNTER — TELEPHONE (OUTPATIENT)
Dept: HEMATOLOGY/ONCOLOGY | Facility: CLINIC | Age: 65
End: 2025-07-15
Payer: MEDICARE

## 2025-07-15 ENCOUNTER — HOSPITAL ENCOUNTER (OUTPATIENT)
Dept: PREADMISSION TESTING | Facility: HOSPITAL | Age: 65
Discharge: HOME | End: 2025-07-15
Attending: STUDENT IN AN ORGANIZED HEALTH CARE EDUCATION/TRAINING PROGRAM
Payer: MEDICARE

## 2025-07-15 VITALS
WEIGHT: 121.06 LBS | HEIGHT: 69 IN | HEART RATE: 87 BPM | TEMPERATURE: 98 F | DIASTOLIC BLOOD PRESSURE: 62 MMHG | RESPIRATION RATE: 18 BRPM | OXYGEN SATURATION: 97 % | SYSTOLIC BLOOD PRESSURE: 111 MMHG | BODY MASS INDEX: 17.93 KG/M2

## 2025-07-15 LAB
ABSOLUTE EOSINOPHIL (SMH): 0.09 K/UL
ABSOLUTE MONOCYTE (SMH): 0.69 K/UL (ref 0.3–1)
ABSOLUTE NEUTROPHIL COUNT (SMH): 4.2 K/UL (ref 1.8–7.7)
ALBUMIN SERPL-MCNC: 2.8 G/DL (ref 3.5–5.2)
ALP SERPL-CCNC: 126 UNIT/L (ref 55–135)
ALT SERPL-CCNC: 35 UNIT/L (ref 10–44)
ANION GAP (SMH): 9 MMOL/L (ref 8–16)
AST SERPL-CCNC: 22 UNIT/L (ref 10–40)
BASOPHILS # BLD AUTO: 0.03 K/UL
BASOPHILS NFR BLD AUTO: 0.5 %
BILIRUB SERPL-MCNC: 0.3 MG/DL (ref 0.1–1)
BUN SERPL-MCNC: 14 MG/DL (ref 8–23)
CALCIUM SERPL-MCNC: 8.4 MG/DL (ref 8.7–10.5)
CHLORIDE SERPL-SCNC: 98 MMOL/L (ref 95–110)
CO2 SERPL-SCNC: 24 MMOL/L (ref 23–29)
CREAT SERPL-MCNC: 0.5 MG/DL (ref 0.5–1.4)
ERYTHROCYTE [DISTWIDTH] IN BLOOD BY AUTOMATED COUNT: 14.4 % (ref 11.5–14.5)
GFR SERPLBLD CREATININE-BSD FMLA CKD-EPI: >60 ML/MIN/1.73/M2
GLUCOSE SERPL-MCNC: 115 MG/DL (ref 70–110)
HCT VFR BLD AUTO: 29.1 % (ref 40–54)
HGB BLD-MCNC: 9.4 GM/DL (ref 14–18)
IMM GRANULOCYTES # BLD AUTO: 0.05 K/UL (ref 0–0.04)
IMM GRANULOCYTES NFR BLD AUTO: 0.9 % (ref 0–0.5)
LYMPHOCYTES # BLD AUTO: 0.66 K/UL (ref 1–4.8)
MAGNESIUM SERPL-MCNC: 2.1 MG/DL (ref 1.6–2.6)
MCH RBC QN AUTO: 26.6 PG (ref 27–31)
MCHC RBC AUTO-ENTMCNC: 32.3 G/DL (ref 32–36)
MCV RBC AUTO: 82 FL (ref 82–98)
NUCLEATED RBC (/100WBC) (SMH): 0 /100 WBC
PLATELET # BLD AUTO: 580 K/UL (ref 150–450)
PMV BLD AUTO: 8.7 FL (ref 9.2–12.9)
POTASSIUM SERPL-SCNC: 4.1 MMOL/L (ref 3.5–5.1)
PROT SERPL-MCNC: 6.2 GM/DL (ref 6–8.4)
RBC # BLD AUTO: 3.54 M/UL (ref 4.6–6.2)
RELATIVE EOSINOPHIL (SMH): 1.6 % (ref 0–8)
RELATIVE LYMPHOCYTE (SMH): 11.5 % (ref 18–48)
RELATIVE MONOCYTE (SMH): 12 % (ref 4–15)
RELATIVE NEUTROPHIL (SMH): 73.5 % (ref 38–73)
SODIUM SERPL-SCNC: 131 MMOL/L (ref 136–145)
WBC # BLD AUTO: 5.75 K/UL (ref 3.9–12.7)

## 2025-07-15 PROCEDURE — 94761 N-INVAS EAR/PLS OXIMETRY MLT: CPT

## 2025-07-15 PROCEDURE — 36415 COLL VENOUS BLD VENIPUNCTURE: CPT | Performed by: INTERNAL MEDICINE

## 2025-07-15 PROCEDURE — 63600175 PHARM REV CODE 636 W HCPCS: Performed by: INTERNAL MEDICINE

## 2025-07-15 PROCEDURE — 27000221 HC OXYGEN, UP TO 24 HOURS

## 2025-07-15 PROCEDURE — 99900035 HC TECH TIME PER 15 MIN (STAT)

## 2025-07-15 PROCEDURE — 94799 UNLISTED PULMONARY SVC/PX: CPT

## 2025-07-15 PROCEDURE — 25000003 PHARM REV CODE 250: Performed by: STUDENT IN AN ORGANIZED HEALTH CARE EDUCATION/TRAINING PROGRAM

## 2025-07-15 PROCEDURE — 83735 ASSAY OF MAGNESIUM: CPT | Performed by: INTERNAL MEDICINE

## 2025-07-15 PROCEDURE — S4991 NICOTINE PATCH NONLEGEND: HCPCS | Performed by: STUDENT IN AN ORGANIZED HEALTH CARE EDUCATION/TRAINING PROGRAM

## 2025-07-15 PROCEDURE — 82040 ASSAY OF SERUM ALBUMIN: CPT | Performed by: INTERNAL MEDICINE

## 2025-07-15 PROCEDURE — 25000242 PHARM REV CODE 250 ALT 637 W/ HCPCS: Performed by: INTERNAL MEDICINE

## 2025-07-15 PROCEDURE — 94640 AIRWAY INHALATION TREATMENT: CPT

## 2025-07-15 PROCEDURE — 85025 COMPLETE CBC W/AUTO DIFF WBC: CPT | Performed by: INTERNAL MEDICINE

## 2025-07-15 PROCEDURE — 99900031 HC PATIENT EDUCATION (STAT)

## 2025-07-15 PROCEDURE — 99406 BEHAV CHNG SMOKING 3-10 MIN: CPT

## 2025-07-15 RX ADMIN — CEFEPIME 2 G: 2 INJECTION, POWDER, FOR SOLUTION INTRAVENOUS at 05:07

## 2025-07-15 RX ADMIN — IPRATROPIUM BROMIDE AND ALBUTEROL SULFATE 3 ML: 2.5; .5 SOLUTION RESPIRATORY (INHALATION) at 01:07

## 2025-07-15 RX ADMIN — BUDESONIDE INHALATION 0.5 MG: 0.5 SUSPENSION RESPIRATORY (INHALATION) at 07:07

## 2025-07-15 RX ADMIN — IPRATROPIUM BROMIDE AND ALBUTEROL SULFATE 3 ML: 2.5; .5 SOLUTION RESPIRATORY (INHALATION) at 07:07

## 2025-07-15 RX ADMIN — NICOTINE 1 PATCH: 14 PATCH, EXTENDED RELEASE TRANSDERMAL at 12:07

## 2025-07-15 NOTE — PHARMACY MED REC
"Admission Medication History     The home medication history was taken by Randy Granados.    You may go to "Admission" then "Reconcile Home Medications" tabs to review and/or act upon these items.     The home medication list has been updated by the Pharmacy department.   Please read ALL comments highlighted in yellow.   Please address this information as you see fit.    Feel free to contact us if you have any questions or require assistance.      The medications listed below were removed from the home medication list. Please reorder if appropriate:  Patient reports no longer taking the following medication(s):  Stiolto Respimat    Medications listed below were obtained from: Patient/family and Analytic software- [employer]  No current facility-administered medications on file prior to encounter.     Current Outpatient Medications on File Prior to Encounter   Medication Sig Dispense Refill    albuterol (PROVENTIL) 2.5 mg /3 mL (0.083 %) nebulizer solution Take 3 mLs (2.5 mg total) by nebulization every 6 (six) hours as needed for Wheezing or Shortness of Breath. Rescue 240 mL 11    albuterol (PROVENTIL/VENTOLIN HFA) 90 mcg/actuation inhaler Inhale 2 puffs into the lungs every 6 (six) hours as needed for Shortness of Breath.      aspirin (ECOTRIN) 81 MG EC tablet Take 1 tablet (81 mg total) by mouth once daily.  0    budesonide-glycopyr-formoterol (BREZTRI AEROSPHERE) 160-9-4.8 mcg/actuation HFAA Inhale 2 puffs into the lungs 2 (two) times a day. 32.1 g 3    megestroL (MEGACE) 40 MG Tab Take 1 tablet (40 mg total) by mouth 2 (two) times daily. 60 tablet 11    sodium chloride 1,000 mg TbSO oral tablet Take 1 tablet (1,000 mg total) by mouth 2 (two) times a day. 60 tablet 0    levoFLOXacin (LEVAQUIN) 750 MG tablet Take 1 tablet (750 mg total) by mouth once daily. for 10 days 10 tablet 0    ondansetron (ZOFRAN) 8 MG tablet Take 1 tablet (8 mg total) by mouth every 8 (eight) hours as needed for Nausea. 30 tablet 2    " promethazine (PHENERGAN) 25 MG tablet Take 1 tablet (25 mg total) by mouth every 4 to 6 hours as needed for Nausea. 30 tablet 2    [DISCONTINUED] lisinopriL (PRINIVIL,ZESTRIL) 20 MG tablet 20 mg. (Patient not taking: Reported on 7/14/2025)      [DISCONTINUED] STIOLTO RESPIMAT 2.5-2.5 mcg/actuation Mist 7/1/25 pt not taking             Randy Granados  EXT 1921                .

## 2025-07-15 NOTE — HOSPITAL COURSE
Patient left AMA.  Warned patient that he could die because he has an infection and patient replied that we all have to die one day and he is okay with dying  Patient was AAO x4.  He signed AMA form and left

## 2025-07-15 NOTE — ASSESSMENT & PLAN NOTE
Chronic issue  On NaCL tablets     Recent Labs     07/14/25  1219 07/14/25  1907 07/15/25  0307   * 129* 131*

## 2025-07-15 NOTE — PLAN OF CARE
07/15/25 1133   Final Note   Assessment Type Final Discharge Note   Anticipated Discharge Disposition Left Against   Hospital Resources/Appts/Education Provided Patient refused appointment set-up     Left AMA

## 2025-07-15 NOTE — TELEPHONE ENCOUNTER
Spoke to pt. Pt very confused about his appts and whether he should still go to radiation this week. Confirmed with Dr. Sheets that only pt's chemo is being held until next week. Pt is to still go to his radiation appts and have his port placed on Thursday. Pt stated that he verbalized understanding.      Copied from CRM #[id number]. Topic: General Inquiry - Patient Advice  >> Jul 15, 2025 12:51 PM Puja wrote:  Type:  Needs Medical Advice    Who Called: Patient     Would the patient rather a call back or a response via [facility]? Call     Best Call Back Number: [phone]    Additional Information: Pt wants to know if his cancer treatment will be discontinued. Pt is confused and needs call back. Thanks!

## 2025-07-15 NOTE — HPI
64 year old pt getting admitted with fever/possible obstructive Pneumonia and R sided pleural effusion   Pt suffers from R sided squamous cell carcinoma of Lung and not a surgical candidate  He was supposed to start on Rx tomorrow and saw Oncology MD today   He still smokes cigarettes   Pt started having fever along with congestion today  He is on 2 L Home oxygen and noticed SOB on exertion for past few days  He came to ER and got admitted

## 2025-07-15 NOTE — CARE UPDATE
07/15/25 0701   Patient Assessment/Suction   Level of Consciousness (AVPU) alert   Respiratory Effort Normal;Unlabored   All Lung Fields Breath Sounds Anterior:;Lateral:;diminished;coarse   Rhythm/Pattern, Respiratory unlabored;pattern regular;depth regular   Cough Type no productive sputum   Skin Integrity   $ Wound Care Tech Time 15 min   Area Observed Left;Right;Behind ear;Nares   Skin Appearance without discoloration   PRE-TX-O2   Device (Oxygen Therapy) nasal cannula   $ Is the patient on Low Flow Oxygen? Yes   Flow (L/min) (Oxygen Therapy) 5   SpO2 97 %   Pulse Oximetry Type Intermittent   $ Pulse Oximetry - Multiple Charge Pulse Oximetry - Multiple   Pulse 87   Resp 18   Aerosol Therapy   $ Aerosol Therapy Charges Aerosol Treatment   Daily Review of Necessity (SVN) completed   Respiratory Treatment Status (SVN) given   Treatment Route (SVN) mask;oxygen   Patient Position HOB elevated   Post Treatment Assessment (SVN) breath sounds improved   Signs of Intolerance (SVN) none   Breath Sounds Post-Respiratory Treatment   Throughout All Fields Post-Treatment All Fields   Throughout All Fields Post-Treatment aeration increased   Post-treatment Heart Rate (beats/min) 88   Post-treatment Resp Rate (breaths/min) 18   Education   $ Education Bronchodilator;15 min

## 2025-07-15 NOTE — H&P
"  [facility] - Emergency Dept  Hospital Medicine  History & Physical    Patient Name: Vlad Gonzales  MRN: [id number]  Patient Class: IP- Inpatient  Admission Date: 7/14/2025  Attending Physician: Leobardo Mendoza MD   Primary Care Provider: Kim Bowser NP         Patient information was obtained from patient, past medical records, ER records, and ER MD .     Subjective:     Principal Problem:Fever    Chief Complaint:   Chief Complaint   Patient presents with    Fever     Fever started today.  "Thinks I have COVID"  pt reports cough - chronic.          HPI: 64 year old pt getting admitted with fever/possible obstructive Pneumonia and R sided pleural effusion   Pt suffers from R sided squamous cell carcinoma of Lung and not a surgical candidate  He was supposed to start on Rx tomorrow and saw Oncology MD today   He still smokes cigarettes   Pt started having fever along with congestion today  He is on 2 L Home oxygen and noticed SOB on exertion for past few days  He came to ER and got admitted     Past Medical History:   Diagnosis Date    Coronary artery disease     cardiac stents    Heart attack     Hepatitis C     Hypertension        Past Surgical History:   Procedure Laterality Date    ANGIOGRAM, CORONARY, WITH LEFT HEART CATHETERIZATION  2013    stent    ENDOBRONCHIAL ULTRASOUND N/A 06/18/2025    Procedure: ENDOBRONCHIAL ULTRASOUND (EBUS);  Surgeon: Luis M Sandoval MD;  Location: [facility];  Service: Pulmonary;  Laterality: N/A;       Review of patient's allergies indicates:  No Known Allergies    No current facility-administered medications on file prior to encounter.     Current Outpatient Medications on File Prior to Encounter   Medication Sig    albuterol (PROVENTIL) 2.5 mg /3 mL (0.083 %) nebulizer solution Take 3 mLs (2.5 mg total) by nebulization every 6 (six) hours as needed for Wheezing or Shortness of Breath. Rescue    albuterol (PROVENTIL/VENTOLIN HFA) 90 mcg/actuation inhaler Inhale " 2 puffs into the lungs.    aspirin (ECOTRIN) 81 MG EC tablet Take 1 tablet (81 mg total) by mouth once daily.    budesonide-glycopyr-formoterol (BREZTRI AEROSPHERE) 160-9-4.8 mcg/actuation HFAA Inhale 2 puffs into the lungs 2 (two) times a day.    levoFLOXacin (LEVAQUIN) 750 MG tablet Take 1 tablet (750 mg total) by mouth once daily. for 10 days    lisinopriL (PRINIVIL,ZESTRIL) 20 MG tablet 20 mg. (Patient not taking: Reported on 7/14/2025)    megestroL (MEGACE) 40 MG Tab Take 1 tablet (40 mg total) by mouth 2 (two) times daily.    ondansetron (ZOFRAN) 8 MG tablet Take 1 tablet (8 mg total) by mouth every 8 (eight) hours as needed for Nausea.    promethazine (PHENERGAN) 25 MG tablet Take 1 tablet (25 mg total) by mouth every 4 to 6 hours as needed for Nausea.    sodium chloride 1,000 mg TbSO oral tablet Take 1 tablet (1,000 mg total) by mouth 2 (two) times a day.    STIOLTO RESPIMAT 2.5-2.5 mcg/actuation Mist 7/1/25 pt not taking     Family History       Problem Relation (Age of Onset)    Cancer Sister, Paternal Uncle, Maternal Grandmother, Maternal Grandfather    Lung cancer Sister, Maternal Grandfather    Skin cancer Paternal Uncle          Tobacco Use    Smoking status: Every Day     Current packs/day: 0.50     Average packs/day: 2.0 packs/day for 40.5 years (80.8 ttl pk-yrs)     Types: Cigarettes     Start date: 1985    Smokeless tobacco: Not on file    Tobacco comments:     Smoking on oxygen      7/1/25 pt instructed no smoking prior to sx   Substance and Sexual Activity    Alcohol use: Not Currently     Comment: quit 6/2025    Drug use: Not Currently    Sexual activity: Not on file     Review of Systems   Constitutional:  Positive for fever. Negative for activity change and appetite change.   HENT:  Negative for congestion and dental problem.    Eyes:  Negative for discharge and itching.   Respiratory:  Positive for shortness of breath.    Cardiovascular:  Negative for chest pain.   Gastrointestinal:  Negative  for abdominal distention and abdominal pain.   Endocrine: Negative for cold intolerance.   Genitourinary:  Negative for difficulty urinating and dysuria.   Musculoskeletal:  Negative for arthralgias and back pain.   Skin:  Negative for color change.   Neurological:  Negative for dizziness and facial asymmetry.   Hematological:  Negative for adenopathy.   Psychiatric/Behavioral:  Negative for agitation and behavioral problems.      Objective:     Vital Signs (Most Recent):  Temp: 99.1 °F (37.3 °C) (07/14/25 1652)  Pulse: 99 (07/14/25 2000)  Resp: 20 (07/14/25 2000)  BP: (!) 99/55 (07/14/25 2000)  SpO2: 100 % (07/14/25 2000) Vital Signs (24h Range):  Temp:  [99.1 °F (37.3 °C)-101.5 °F (38.6 °C)] 99.1 °F (37.3 °C)  Pulse:  [] 99  Resp:  [18-20] 20  SpO2:  [94 %-100 %] 100 %  BP: ()/(55-68) 99/55     Weight: 53.1 kg (117 lb)  Body mass index is 17.28 kg/m².     Physical Exam  Vitals and nursing note reviewed.   Constitutional:       Appearance: He is well-developed.   HENT:      Head: Atraumatic.      Right Ear: External ear normal.      Left Ear: External ear normal.      Nose: Nose normal.      Mouth/Throat:      Mouth: Mucous membranes are dry.   Cardiovascular:      Rate and Rhythm: Normal rate.   Pulmonary:      Effort: Pulmonary effort is normal.   Abdominal:      Palpations: Abdomen is soft.   Musculoskeletal:         General: Normal range of motion.      Cervical back: Full passive range of motion without pain and normal range of motion.   Skin:     General: Skin is warm.   Neurological:      Mental Status: He is alert and oriented to person, place, and time.   Psychiatric:         Behavior: Behavior normal.                Significant Labs: All pertinent labs within the past 24 hours have been reviewed.  CBC:   Recent Labs   Lab 07/14/25  1219 07/14/25  1908   WBC 6.15 7.26   HGB 10.0* 8.7*   HCT 30.9* 26.5*   * 513*     CMP:   Recent Labs   Lab 07/14/25  1219 07/14/25  1907   * 129*   K  3.9 4.5   CL 96 96   CO2 23 25   * 108   BUN 18 16   CREATININE 0.5 0.5   CALCIUM 8.8 8.5*   PROT 6.9 6.5   ALBUMIN 3.0* 2.9*   BILITOT 0.3 0.3   ALKPHOS 126 131   AST 26 32   ALT 37 39   ANIONGAP 10 8       Significant Imaging: I have reviewed all pertinent imaging results/findings within the past 24 hours.  Assessment/Plan:     Assessment & Plan  Fever  Mostly from Obstructive pneumonia  Follow up CT ordered by ER team  Blood cultures now  Started on iv cefepime/PO azithromycin  One dose of iv vancomycin  Pulmonology Consult     Tobacco abuse  Ongoing issue     Chronic obstructive pulmonary disease, unspecified COPD type  Patient's COPD is controlled currently.  Patient is currently off COPD Pathway. Continue scheduled inhalers Steroids, Antibiotics, and Supplemental oxygen and monitor respiratory status closely.   Malignant neoplasm of lower lobe of right lung  SCC of lung  PET scan showed  no distant metastases   Hyponatremia  Chronic issue  On NaCL tablets     Recent Labs     07/14/25  1219 07/14/25  1907   * 129*     Pleural effusion on right  Mostly malignant pleural effusion  Follow up CT Chest results  Pulmonology on Board   Need thoracentesis       VTE Risk Mitigation (From admission, onward)           Ordered     IP VTE HIGH RISK PATIENT  Once         07/14/25 2133     Place sequential compression device  Until discontinued         07/14/25 2133                                                Leobardo Mendoza MD  Department of Hospital Medicine  [facility] - Emergency Dept           Cell Phone/PDA (specify)/Clothing

## 2025-07-15 NOTE — ASSESSMENT & PLAN NOTE
Mostly malignant pleural effusion  Follow up CT Chest results  Pulmonology on Board   Need thoracentesis

## 2025-07-15 NOTE — CARE UPDATE
07/15/25 0121   Patient Assessment/Suction   Level of Consciousness (AVPU) alert   Respiratory Effort Normal;Unlabored   Expansion/Accessory Muscles/Retractions no use of accessory muscles   All Lung Fields Breath Sounds equal bilaterally;coarse   Rhythm/Pattern, Respiratory unlabored   Cough Frequency frequent   Cough Type fair;loose;nonproductive   PRE-TX-O2   Device (Oxygen Therapy) nasal cannula   Flow (L/min) (Oxygen Therapy) 5   SpO2 96 %   Pulse Oximetry Type Intermittent   $ Pulse Oximetry - Multiple Charge Pulse Oximetry - Multiple   Pulse 91   Resp 20   Aerosol Therapy   $ Aerosol Therapy Charges Aerosol Treatment   Daily Review of Necessity (SVN) completed   Respiratory Treatment Status (SVN) given   Treatment Route (SVN) mask   Patient Position HOB elevated   Post Treatment Assessment (SVN) breath sounds unchanged   Signs of Intolerance (SVN) none   Breath Sounds Post-Respiratory Treatment   Throughout All Fields Post-Treatment All Fields   Throughout All Fields Post-Treatment no change   Post-treatment Heart Rate (beats/min) 98   Post-treatment Resp Rate (breaths/min) 20   Education   $ Education Bronchodilator;Oxygen;15 min

## 2025-07-15 NOTE — ASSESSMENT & PLAN NOTE
Mostly from Obstructive pneumonia  Follow up CT ordered by ER team  Blood cultures now  Started on iv cefepime/PO azithromycin  One dose of iv vancomycin  Pulmonology Consult

## 2025-07-15 NOTE — SUBJECTIVE & OBJECTIVE
Past Medical History:   Diagnosis Date    Coronary artery disease     cardiac stents    Heart attack     Hepatitis C     Hypertension        Past Surgical History:   Procedure Laterality Date    ANGIOGRAM, CORONARY, WITH LEFT HEART CATHETERIZATION  2013    stent    ENDOBRONCHIAL ULTRASOUND N/A 06/18/2025    Procedure: ENDOBRONCHIAL ULTRASOUND (EBUS);  Surgeon: Luis M Sandoval MD;  Location: [facility];  Service: Pulmonary;  Laterality: N/A;       Review of patient's allergies indicates:  No Known Allergies    No current facility-administered medications on file prior to encounter.     Current Outpatient Medications on File Prior to Encounter   Medication Sig    albuterol (PROVENTIL) 2.5 mg /3 mL (0.083 %) nebulizer solution Take 3 mLs (2.5 mg total) by nebulization every 6 (six) hours as needed for Wheezing or Shortness of Breath. Rescue    albuterol (PROVENTIL/VENTOLIN HFA) 90 mcg/actuation inhaler Inhale 2 puffs into the lungs.    aspirin (ECOTRIN) 81 MG EC tablet Take 1 tablet (81 mg total) by mouth once daily.    budesonide-glycopyr-formoterol (BREZTRI AEROSPHERE) 160-9-4.8 mcg/actuation HFAA Inhale 2 puffs into the lungs 2 (two) times a day.    levoFLOXacin (LEVAQUIN) 750 MG tablet Take 1 tablet (750 mg total) by mouth once daily. for 10 days    lisinopriL (PRINIVIL,ZESTRIL) 20 MG tablet 20 mg. (Patient not taking: Reported on 7/14/2025)    megestroL (MEGACE) 40 MG Tab Take 1 tablet (40 mg total) by mouth 2 (two) times daily.    ondansetron (ZOFRAN) 8 MG tablet Take 1 tablet (8 mg total) by mouth every 8 (eight) hours as needed for Nausea.    promethazine (PHENERGAN) 25 MG tablet Take 1 tablet (25 mg total) by mouth every 4 to 6 hours as needed for Nausea.    sodium chloride 1,000 mg TbSO oral tablet Take 1 tablet (1,000 mg total) by mouth 2 (two) times a day.    STIOLTO RESPIMAT 2.5-2.5 mcg/actuation Mist 7/1/25 pt not taking     Family History       Problem Relation (Age of Onset)    Cancer Sister, Paternal  Uncle, Maternal Grandmother, Maternal Grandfather    Lung cancer Sister, Maternal Grandfather    Skin cancer Paternal Uncle          Tobacco Use    Smoking status: Every Day     Current packs/day: 0.50     Average packs/day: 2.0 packs/day for 40.5 years (80.8 ttl pk-yrs)     Types: Cigarettes     Start date: 1985    Smokeless tobacco: Not on file    Tobacco comments:     Smoking on oxygen      7/1/25 pt instructed no smoking prior to sx   Substance and Sexual Activity    Alcohol use: Not Currently     Comment: quit 6/2025    Drug use: Not Currently    Sexual activity: Not on file     Review of Systems   Constitutional:  Positive for fever. Negative for activity change and appetite change.   HENT:  Negative for congestion and dental problem.    Eyes:  Negative for discharge and itching.   Respiratory:  Positive for shortness of breath.    Cardiovascular:  Negative for chest pain.   Gastrointestinal:  Negative for abdominal distention and abdominal pain.   Endocrine: Negative for cold intolerance.   Genitourinary:  Negative for difficulty urinating and dysuria.   Musculoskeletal:  Negative for arthralgias and back pain.   Skin:  Negative for color change.   Neurological:  Negative for dizziness and facial asymmetry.   Hematological:  Negative for adenopathy.   Psychiatric/Behavioral:  Negative for agitation and behavioral problems.      Objective:     Vital Signs (Most Recent):  Temp: 99.1 °F (37.3 °C) (07/14/25 1652)  Pulse: 99 (07/14/25 2000)  Resp: 20 (07/14/25 2000)  BP: (!) 99/55 (07/14/25 2000)  SpO2: 100 % (07/14/25 2000) Vital Signs (24h Range):  Temp:  [99.1 °F (37.3 °C)-101.5 °F (38.6 °C)] 99.1 °F (37.3 °C)  Pulse:  [] 99  Resp:  [18-20] 20  SpO2:  [94 %-100 %] 100 %  BP: ()/(55-68) 99/55     Weight: 53.1 kg (117 lb)  Body mass index is 17.28 kg/m².     Physical Exam  Vitals and nursing note reviewed.   Constitutional:       Appearance: He is well-developed.   HENT:      Head: Atraumatic.       Right Ear: External ear normal.      Left Ear: External ear normal.      Nose: Nose normal.      Mouth/Throat:      Mouth: Mucous membranes are dry.   Cardiovascular:      Rate and Rhythm: Normal rate.   Pulmonary:      Effort: Pulmonary effort is normal.   Abdominal:      Palpations: Abdomen is soft.   Musculoskeletal:         General: Normal range of motion.      Cervical back: Full passive range of motion without pain and normal range of motion.   Skin:     General: Skin is warm.   Neurological:      Mental Status: He is alert and oriented to person, place, and time.   Psychiatric:         Behavior: Behavior normal.                Significant Labs: All pertinent labs within the past 24 hours have been reviewed.  CBC:   Recent Labs   Lab 07/14/25  1219 07/14/25  1908   WBC 6.15 7.26   HGB 10.0* 8.7*   HCT 30.9* 26.5*   * 513*     CMP:   Recent Labs   Lab 07/14/25  1219 07/14/25  1907   * 129*   K 3.9 4.5   CL 96 96   CO2 23 25   * 108   BUN 18 16   CREATININE 0.5 0.5   CALCIUM 8.8 8.5*   PROT 6.9 6.5   ALBUMIN 3.0* 2.9*   BILITOT 0.3 0.3   ALKPHOS 126 131   AST 26 32   ALT 37 39   ANIONGAP 10 8       Significant Imaging: I have reviewed all pertinent imaging results/findings within the past 24 hours.

## 2025-07-15 NOTE — OR NURSING
PAT done via phone. Preop instructions reviewed with verbalized understanding. Also sent to [employer].

## 2025-07-15 NOTE — DISCHARGE SUMMARY
[facility] Medicine  Discharge Summary      Patient Name: Vlad Gonzales  MRN: [id number]  LEVON: [id number]  Patient Class: IP- Inpatient  Admission Date: 7/14/2025  Hospital Length of Stay: 1 days  Discharge Date and Time: 07/15/2025 1:59 PM  Attending Physician: No att. providers found   Discharging Provider: Sean Rivas DO  Primary Care Provider: Kim Bowser NP    Primary Care Team: Networked reference to record PCT     HPI:   64 year old pt getting admitted with fever/possible obstructive Pneumonia and R sided pleural effusion   Pt suffers from R sided squamous cell carcinoma of Lung and not a surgical candidate  He was supposed to start on Rx tomorrow and saw Oncology MD today   He still smokes cigarettes   Pt started having fever along with congestion today  He is on 2 L Home oxygen and noticed SOB on exertion for past few days  He came to ER and got admitted     * No surgery found *      Hospital Course:   Patient left AMA.  Warned patient that he could die because he has an infection and patient replied that we all have to die one day and he is okay with dying  Patient was AAO x4.  He signed AMA form and left     Goals of Care Treatment Preferences:  Code Status: Full Code         Consults:   Consults (From admission, onward)          Status Ordering Provider     Inpatient consult to Pulmonology  Once        Provider:  Luis M Sandoval MD    Acknowledged VIKI UGALDE            Assessment & Plan  Fever  Mostly from Obstructive pneumonia  Follow up CT ordered by ER team  Blood cultures now  Started on iv cefepime/PO azithromycin  One dose of iv vancomycin  Pulmonology Consult     Tobacco abuse  Ongoing issue     Chronic obstructive pulmonary disease, unspecified COPD type  Patient's COPD is controlled currently.  Patient is currently off COPD Pathway. Continue scheduled inhalers Steroids, Antibiotics, and Supplemental oxygen and monitor respiratory status closely.   Malignant  neoplasm of lower lobe of right lung  SCC of lung  PET scan showed  no distant metastases   Hyponatremia  Chronic issue  On NaCL tablets     Recent Labs     07/14/25  1219 07/14/25  1907 07/15/25  0307   * 129* 131*     Pleural effusion on right  Mostly malignant pleural effusion  Follow up CT Chest results  Pulmonology on Board   Need thoracentesis     Final Active Diagnoses:    Diagnosis Date Noted POA    PRINCIPAL PROBLEM:  Fever [R50.9] 07/14/2025 Yes    Hyponatremia [E87.1] 07/14/2025 Yes    Pleural effusion on right [J90] 07/14/2025 Yes    Malignant neoplasm of lower lobe of right lung [C34.31] 06/20/2025 Yes    Chronic obstructive pulmonary disease, unspecified COPD type [J44.9] 04/25/2025 Yes    Tobacco abuse [Z72.0] 03/19/2020 Yes      Problems Resolved During this Admission:       Discharged Condition: against medical advice    Disposition: Left Against Medical Adv*    Follow Up:    Patient Instructions:   No discharge procedures on file.    Significant Diagnostic Studies: N/A    Pending Diagnostic Studies:       Procedure Component Value Units Date/Time    EKG 12-lead [[id number]]     Order Status: Sent Lab Status: No result            Medications:  None    Indwelling Lines/Drains at time of discharge:   Lines/Drains/Airways       None                       Time spent on the discharge of patient: 32 minutes         Sean Rivas DO  Department of Hospital Medicine  [facility]

## 2025-07-17 ENCOUNTER — ANESTHESIA EVENT (OUTPATIENT)
Dept: SURGERY | Facility: HOSPITAL | Age: 65
End: 2025-07-17
Payer: MEDICARE

## 2025-07-17 LAB
OHS QRS DURATION: 88 MS
OHS QTC CALCULATION: 414 MS

## 2025-07-18 ENCOUNTER — ANESTHESIA (OUTPATIENT)
Dept: SURGERY | Facility: HOSPITAL | Age: 65
End: 2025-07-18
Payer: MEDICARE

## 2025-07-18 ENCOUNTER — HOSPITAL ENCOUNTER (OUTPATIENT)
Facility: HOSPITAL | Age: 65
Discharge: HOME | End: 2025-07-18
Attending: STUDENT IN AN ORGANIZED HEALTH CARE EDUCATION/TRAINING PROGRAM | Admitting: STUDENT IN AN ORGANIZED HEALTH CARE EDUCATION/TRAINING PROGRAM
Payer: MEDICARE

## 2025-07-18 VITALS
RESPIRATION RATE: 18 BRPM | BODY MASS INDEX: 17.28 KG/M2 | SYSTOLIC BLOOD PRESSURE: 100 MMHG | TEMPERATURE: 98 F | WEIGHT: 117 LBS | HEART RATE: 86 BPM | OXYGEN SATURATION: 100 % | DIASTOLIC BLOOD PRESSURE: 61 MMHG

## 2025-07-18 DIAGNOSIS — R91.8 LUNG MASS: ICD-10-CM

## 2025-07-18 DIAGNOSIS — C34.90 MALIGNANT NEOPLASM OF LUNG, UNSPECIFIED LATERALITY, UNSPECIFIED PART OF LUNG: Primary | ICD-10-CM

## 2025-07-18 PROCEDURE — 63600175 PHARM REV CODE 636 W HCPCS: Performed by: NURSE ANESTHETIST, CERTIFIED REGISTERED

## 2025-07-18 PROCEDURE — 63600175 PHARM REV CODE 636 W HCPCS: Performed by: STUDENT IN AN ORGANIZED HEALTH CARE EDUCATION/TRAINING PROGRAM

## 2025-07-18 PROCEDURE — 37000008 HC ANESTHESIA 1ST 15 MINUTES: Performed by: STUDENT IN AN ORGANIZED HEALTH CARE EDUCATION/TRAINING PROGRAM

## 2025-07-18 PROCEDURE — 37000009 HC ANESTHESIA EA ADD 15 MINS: Performed by: STUDENT IN AN ORGANIZED HEALTH CARE EDUCATION/TRAINING PROGRAM

## 2025-07-18 PROCEDURE — C1788 PORT, INDWELLING, IMP: HCPCS | Performed by: STUDENT IN AN ORGANIZED HEALTH CARE EDUCATION/TRAINING PROGRAM

## 2025-07-18 PROCEDURE — 71000015 HC POSTOP RECOV 1ST HR: Performed by: STUDENT IN AN ORGANIZED HEALTH CARE EDUCATION/TRAINING PROGRAM

## 2025-07-18 PROCEDURE — 36000706: Performed by: STUDENT IN AN ORGANIZED HEALTH CARE EDUCATION/TRAINING PROGRAM

## 2025-07-18 PROCEDURE — 25000003 PHARM REV CODE 250: Performed by: ANESTHESIOLOGY

## 2025-07-18 PROCEDURE — 36000707: Performed by: STUDENT IN AN ORGANIZED HEALTH CARE EDUCATION/TRAINING PROGRAM

## 2025-07-18 PROCEDURE — 36561 INSERT TUNNELED CV CATH: CPT | Mod: RT,,, | Performed by: STUDENT IN AN ORGANIZED HEALTH CARE EDUCATION/TRAINING PROGRAM

## 2025-07-18 PROCEDURE — 71000033 HC RECOVERY, INTIAL HOUR: Performed by: STUDENT IN AN ORGANIZED HEALTH CARE EDUCATION/TRAINING PROGRAM

## 2025-07-18 PROCEDURE — 77001 FLUOROGUIDE FOR VEIN DEVICE: CPT | Mod: 26,,, | Performed by: STUDENT IN AN ORGANIZED HEALTH CARE EDUCATION/TRAINING PROGRAM

## 2025-07-18 PROCEDURE — 25000003 PHARM REV CODE 250: Performed by: NURSE ANESTHETIST, CERTIFIED REGISTERED

## 2025-07-18 DEVICE — KIT POWERPORT SINGLE 8FR: Type: IMPLANTABLE DEVICE | Site: CHEST | Status: FUNCTIONAL

## 2025-07-18 RX ORDER — ONDANSETRON HYDROCHLORIDE 2 MG/ML
INJECTION, SOLUTION INTRAVENOUS
Status: DISCONTINUED | OUTPATIENT
Start: 2025-07-18 | End: 2025-07-18

## 2025-07-18 RX ORDER — HEPARIN SODIUM,PORCINE 10 UNIT/ML
VIAL (ML) INTRAVENOUS
Status: DISCONTINUED | OUTPATIENT
Start: 2025-07-18 | End: 2025-07-18 | Stop reason: HOSPADM

## 2025-07-18 RX ORDER — FENTANYL CITRATE 50 UG/ML
INJECTION, SOLUTION INTRAMUSCULAR; INTRAVENOUS
Status: DISCONTINUED | OUTPATIENT
Start: 2025-07-18 | End: 2025-07-18

## 2025-07-18 RX ORDER — DEXMEDETOMIDINE HYDROCHLORIDE 100 UG/ML
INJECTION, SOLUTION INTRAVENOUS
Status: DISCONTINUED | OUTPATIENT
Start: 2025-07-18 | End: 2025-07-18

## 2025-07-18 RX ORDER — SODIUM CHLORIDE 9 MG/ML
INJECTION, SOLUTION INTRAVENOUS CONTINUOUS
Status: DISCONTINUED | OUTPATIENT
Start: 2025-07-18 | End: 2025-07-18 | Stop reason: HOSPADM

## 2025-07-18 RX ORDER — FENTANYL CITRATE 50 UG/ML
25 INJECTION, SOLUTION INTRAMUSCULAR; INTRAVENOUS EVERY 5 MIN PRN
Status: DISCONTINUED | OUTPATIENT
Start: 2025-07-18 | End: 2025-07-18 | Stop reason: HOSPADM

## 2025-07-18 RX ORDER — ONDANSETRON HYDROCHLORIDE 2 MG/ML
4 INJECTION, SOLUTION INTRAVENOUS DAILY PRN
Status: DISCONTINUED | OUTPATIENT
Start: 2025-07-18 | End: 2025-07-18 | Stop reason: HOSPADM

## 2025-07-18 RX ORDER — OXYCODONE HYDROCHLORIDE 5 MG/1
5 TABLET ORAL
Status: DISCONTINUED | OUTPATIENT
Start: 2025-07-18 | End: 2025-07-18 | Stop reason: HOSPADM

## 2025-07-18 RX ORDER — ACETAMINOPHEN 10 MG/ML
INJECTION, SOLUTION INTRAVENOUS
Status: DISCONTINUED | OUTPATIENT
Start: 2025-07-18 | End: 2025-07-18

## 2025-07-18 RX ORDER — SODIUM CHLORIDE 0.9 % (FLUSH) 0.9 %
3 SYRINGE (ML) INJECTION
Status: DISCONTINUED | OUTPATIENT
Start: 2025-07-18 | End: 2025-07-18 | Stop reason: HOSPADM

## 2025-07-18 RX ORDER — CEFAZOLIN 2 G/1
2 INJECTION, POWDER, FOR SOLUTION INTRAMUSCULAR; INTRAVENOUS
Status: COMPLETED | OUTPATIENT
Start: 2025-07-18 | End: 2025-07-18

## 2025-07-18 RX ORDER — LIDOCAINE HYDROCHLORIDE 10 MG/ML
1 INJECTION, SOLUTION EPIDURAL; INFILTRATION; INTRACAUDAL; PERINEURAL ONCE
Status: DISCONTINUED | OUTPATIENT
Start: 2025-07-18 | End: 2025-07-18 | Stop reason: HOSPADM

## 2025-07-18 RX ORDER — PROPOFOL 10 MG/ML
VIAL (ML) INTRAVENOUS
Status: DISCONTINUED | OUTPATIENT
Start: 2025-07-18 | End: 2025-07-18

## 2025-07-18 RX ADMIN — CEFAZOLIN 2 G: 2 INJECTION, POWDER, FOR SOLUTION INTRAMUSCULAR; INTRAVENOUS at 07:07

## 2025-07-18 RX ADMIN — PROPOFOL 40 MG: 10 INJECTION, EMULSION INTRAVENOUS at 07:07

## 2025-07-18 RX ADMIN — DEXMEDETOMIDINE HYDROCHLORIDE 10 MCG: 100 INJECTION, SOLUTION INTRAVENOUS at 07:07

## 2025-07-18 RX ADMIN — FENTANYL CITRATE 50 MCG: 50 INJECTION, SOLUTION INTRAMUSCULAR; INTRAVENOUS at 07:07

## 2025-07-18 RX ADMIN — ACETAMINOPHEN 700 MG: 10 INJECTION INTRAVENOUS at 07:07

## 2025-07-18 RX ADMIN — SODIUM CHLORIDE, SODIUM GLUCONATE, SODIUM ACETATE, POTASSIUM CHLORIDE AND MAGNESIUM CHLORIDE: 526; 502; 368; 37; 30 INJECTION, SOLUTION INTRAVENOUS at 06:07

## 2025-07-18 RX ADMIN — PROPOFOL 20 MG: 10 INJECTION, EMULSION INTRAVENOUS at 07:07

## 2025-07-18 RX ADMIN — ONDANSETRON 4 MG: 2 INJECTION INTRAMUSCULAR; INTRAVENOUS at 07:07

## 2025-07-18 RX ADMIN — SODIUM CHLORIDE, SODIUM GLUCONATE, SODIUM ACETATE, POTASSIUM CHLORIDE, MAGNESIUM CHLORIDE, SODIUM PHOSPHATE, DIBASIC, AND POTASSIUM PHOSPHATE: .53; .5; .37; .037; .03; .012; .00082 INJECTION, SOLUTION INTRAVENOUS at 07:07

## 2025-07-18 NOTE — ANESTHESIA POSTPROCEDURE EVALUATION
Anesthesia Post Evaluation    Patient: Vlad Gonzales    Procedure(s) Performed: Procedure(s) (LRB):  [id number] (Right)    Final Anesthesia Type: general      Patient location during evaluation: PACU  Patient participation: Yes- Able to Participate  Level of consciousness: awake and alert  Post-procedure vital signs: reviewed and stable  Pain management: adequate  Airway patency: patent    PONV status at discharge: No PONV  Anesthetic complications: no      Cardiovascular status: hemodynamically stable  Respiratory status: unassisted and room air  Hydration status: euvolemic  Follow-up not needed.              Vitals Value Taken Time   /61 07/18/25 09:07   Temp 36.8 °C (98.2 °F) 07/18/25 08:10   Pulse 86 07/18/25 09:07   Resp 18 07/18/25 09:07   SpO2 100 % 07/18/25 09:07         Event Time   Out of Recovery 08:51:00         Pain/Tamir Score: Tamir Score: 10 (7/18/2025  9:07 AM)  Modified Tamir Score: 20 (7/18/2025  9:07 AM)

## 2025-07-18 NOTE — OP NOTE
[facility]  Surgery Department  Operative Note    SUMMARY     Date of Procedure: 7/18/2025     Procedure: Procedure(s) (LRB):  [id number]-CATH (Right)     Surgeons and Role:     * Jose Jackson MD - Primary    Assisting Surgeon: None    Pre-Operative Diagnosis: Malignant neoplasm of lung, unspecified laterality, unspecified part of lung [C34.90]    Post-Operative Diagnosis: Post-Op Diagnosis Codes:     * Malignant neoplasm of lung, unspecified laterality, unspecified part of lung [C34.90]    Anesthesia: General    Operative Findings (including complications, if any):  Tunneled right IJ port    Description of Technical Procedures:  This is a 64-year-old male who presented with lung cancer.  He did consent to port placement for chemo.  He was taken to the OR, placed supine, sedated by Anesthesia, the neck and chest were prepped and draped in the usual fashion, a time-out was completed.  Using ultrasound, the right internal jugular vein was cannulated with a hollow bore needle.  A wire was advanced and confirmed to be in appropriate location using ultrasound and fluoroscopy.  A location on the right chest wall was chosen for the port site.  A 3 cm incision was made sharply.  A port pocket was developed using electrocautery.  The port was secured within the pocket using Vicryl sutures.  The catheter was then tunneled from the chest wall to the wire exit site in the neck.  Using Seldinger technique and under fluoroscopic guidance, the wire was exchanged for a split sheath dilator without apparent complication.  The catheter was advanced down the sheath and the sheath was then removed.  Repeat fluoroscopic imaging confirmed appropriate location of the catheter without kinking.  The port was accessed, aspirated, and then flushed with heparinized saline with ease.  The port pocket was closed in layers with Vicryl and Monocryl.  The wire exit site in the neck was closed with a buried  Vicryl suture.  Dermabond was applied as a dressing.  Patient tolerated the entire procedure without apparent complication, was woken from anesthesia, brought to recovery in stable condition.  All counts were correct.    Significant Surgical Tasks Conducted by the Assistant(s), if Applicable: n/a    Estimated Blood Loss (EBL): min           Implants:   Implant Name Type Inv. Item Serial No.  Lot No. LRB No. Used Action   KIT POWERPORT SINGLE 8FR - [id number]  KIT POWERPORT SINGLE 8FR  [employer] [id number] Right 1 Implanted       Specimens:   Specimen (24h ago, onward)      None           * No specimens in log *           Condition: Good    Disposition: PACU - hemodynamically stable.    Attestation: Op Note Attestation: I was physically present and scrubbed for the entire procedure.

## 2025-07-18 NOTE — DISCHARGE INSTRUCTIONS
" DEPARTMENT OF GENERAL SURGERY   PORT-A-CATH PLACMENT         Dressing:    You may remove the clear top dressing ("plastic wrap" looking dressing with attached gauze) after 48 hours.    Leave steri-strips in place. If steri-strips get wet, pat them dry. If they fall off, do not worry. They will fall off on their own. Do not pull them off.   After 48 hours you can shower/wash over the incision/steri-strips with antibacterial soap and warm water. Do not take a tub bath and do not soak the surgical site.    Activity:    You should be able to return to your regular activities 2 days after your surgery.     However, do not engage in strenuous activities in which you use your upper body. Do not lift anything heavier than a gallon of milk.    Medication For Pain:    You may find that over-the-counter pain medications may be sufficient for your pain.    You may need to take an over-the-counter stool softener for constipation.       CALL PHYSICIAN FOR:   Redness, swelling, or bleeding from surgical site.   Fever greater than 101.   Drainage from the incision site.   Persistent pain is not relieved by pain medication.  DEPARTMENT OF GENERAL SURGERY   PORT-A-CATH PLACMENT         Dressing:    You may remove the clear top dressing ("plastic wrap" looking dressing with attached gauze) after 48 hours.    Leave steri-strips in place. If steri-strips get wet, pat them dry. If they fall off, do not worry. They will fall off on their own. Do not pull them off.   After 48 hours you can shower/wash over the incision/steri-strips with antibacterial soap and warm water. Do not take a tub bath and do not soak the surgical site.    Activity:    You should be able to return to your regular activities 2 days after your surgery.     However, do not engage in strenuous activities in which you use your upper body. Do not lift anything heavier than a gallon of milk.    Medication For Pain:    You may find that over-the-counter pain medications " "may be sufficient for your pain.    You may need to take an over-the-counter stool softener for constipation.       CALL PHYSICIAN FOR:   Redness, swelling, or bleeding from surgical site.   Fever greater than 101.   Drainage from the incision site.   Persistent pain is not relieved by pain medication.  DEPARTMENT OF GENERAL SURGERY   PORT-A-CATH PLACMENT         Dressing:    You may remove the clear top dressing ("plastic wrap" looking dressing with attached gauze) after 48 hours.    Leave steri-strips in place. If steri-strips get wet, pat them dry. If they fall off, do not worry. They will fall off on their own. Do not pull them off.   After 48 hours you can shower/wash over the incision/steri-strips with antibacterial soap and warm water. Do not take a tub bath and do not soak the surgical site.    Activity:    You should be able to return to your regular activities 2 days after your surgery.     However, do not engage in strenuous activities in which you use your upper body. Do not lift anything heavier than a gallon of milk.    Medication For Pain:    You may find that over-the-counter pain medications may be sufficient for your pain.    You may need to take an over-the-counter stool softener for constipation.       CALL PHYSICIAN FOR:   Redness, swelling, or bleeding from surgical site.   Fever greater than 101.   Drainage from the incision site.   Persistent pain is not relieved by pain medication.     ***      General Information:    1.  Do not drink alcoholic beverages including beer for 24 hours or as long as you are on pain medication..  2.  Do not drive a motor vehicle, operate machinery or power tools, or signs legal papers for 24 hours or as long as you are on pain medication.   3.  You may experience light-headedness, dizziness, and sleepiness following surgery. Please do not stay alone. A responsible adult should be with you for this 24 hour period.  4.  Go home and rest.  5. Progress slowly to a " normal diet unless instructed.  Otherwise, begin with liquids such as soft drinks, then soup and crackers working up to solid foods. Drink plenty of nonalcoholic fluids.  6.  Certain anesthetics and pain medications produce nausea and vomiting in certain individuals. If nausea becomes a problem at home, call you doctor.  7. A nurse will be calling you sometime after surgery. Do not be alarmed. This is our way of finding out how you are doing.  8. Several times every hour while you are awake, take 2-3 deep breaths and cough. If you had stomach surgery hold a pillow or rolled towel firmly against your stomach before you cough. This will help with any pain the cough might cause.  9. Several times every hour while you are awake, pump and flex your feet 5-6 times and do foot circles. This will help prevent blood clots.  10.Call your doctor for severe pain, bleeding, fever, or signs or symptoms of infection (pain, swelling, redness, foul odor, drainage).

## 2025-07-18 NOTE — ANESTHESIA PREPROCEDURE EVALUATION
07/18/2025  Vlad Gonzales is a 64 y.o., male.      Pre-op Assessment    I have reviewed the Patient Summary Reports.     I have reviewed the Nursing Notes. I have reviewed the NPO Status.   I have reviewed the Medications.     Review of Systems  Social:  Alcohol Use, Smoker       Hematology/Oncology:                      Current/Recent Cancer.  Other (see Oncology comments)              Cardiovascular:     Hypertension  Past MI CAD                       Shortness of Breath    Coronary Artery Disease:          Hx of Myocardial Infarction                  Hypertension         Pulmonary:   COPD   Shortness of breath      Chronic Obstructive Pulmonary Disease (COPD):                      Hepatic/GI:      Liver Disease, Hepatitis, C           Liver Disease, Hepatitis        Musculoskeletal:  Musculoskeletal Normal                Neurological:  Neurology Normal                                      Dermatological:  Skin Normal    Psych:  Psychiatric Normal                    Physical Exam  General: Cachexia, Cooperative, Alert and Oriented    Airway:  Mallampati: II   Mouth Opening: Normal  TM Distance: Normal  Tongue: Normal    Dental:  Edentulous    Chest/Lungs:  Normal Respiratory Rate    Heart:  Rate: Normal        Anesthesia Plan  Type of Anesthesia, risks & benefits discussed:    Anesthesia Type: Gen Natural Airway  Intra-op Monitoring Plan: Standard ASA Monitors  Post Op Pain Control Plan: multimodal analgesia  Induction:  IV  Informed Consent: Informed consent signed with the Patient and all parties understand the risks and agree with anesthesia plan.  All questions answered.   ASA Score: 3  Day of Surgery Review of History & Physical: H&P Update referred to the surgeon/provider.    Ready For Surgery From Anesthesia Perspective.     .

## 2025-07-18 NOTE — PLAN OF CARE
Patient ready for surgery. Surgery and anesthesia consents signed. Educated on incentive spirometer use. Belongings in pre-op locker. Bob set up with text message notifications.

## 2025-07-18 NOTE — DISCHARGE SUMMARY
[facility]  Brief Operative Note    Surgery Date: 7/18/2025     Surgeons and Role:     * Jose Jackson MD - Primary    Assisting Surgeon: None    Pre-op Diagnosis:  Malignant neoplasm of lung, unspecified laterality, unspecified part of lung [C34.90]    Post-op Diagnosis:  Post-Op Diagnosis Codes:     * Malignant neoplasm of lung, unspecified laterality, unspecified part of lung [C34.90]    Procedure(s) (LRB):  [id number]-CATH (Right)    Anesthesia: General    Operative Findings:  Tunneled port placement    Estimated Blood Loss:  Minimal         Specimens:   Specimen (24h ago, onward)      None            * No specimens in log *        Discharge Note    OUTCOME: Patient tolerated treatment/procedure well without complication and is now ready for discharge.    DISPOSITION: Home or Self Care    FINAL DIAGNOSIS:  Lung mass    FOLLOWUP: In clinic    DISCHARGE INSTRUCTIONS:    Discharge Procedure Orders   Diet Adult Regular     Notify your health care provider if you experience any of the following:  worsening rash     Notify your health care provider if you experience any of the following:  redness, tenderness, or signs of infection (pain, swelling, redness, odor or green/yellow discharge around incision site)     Notify your health care provider if you experience any of the following:  severe uncontrolled pain     Notify your health care provider if you experience any of the following:  temperature >100.4     No dressing needed   Order Comments: Okay to shower.  No swimming or soaking for 3 weeks.     Activity as tolerated

## 2025-07-18 NOTE — PLAN OF CARE
Pt and family given discharge instructions. Educated on post anesthesia precautions, dressing care, and when to notify MD. Pt sent home with ID cards for port. All belongings returned to pt. Transferred to personal vehicle via wheelchair.

## 2025-07-18 NOTE — TRANSFER OF CARE
Anesthesia Transfer of Care Note    Patient: Vlad Gonzales    Procedure(s) Performed: Procedure(s) (LRB):  [id number] (Right)    Patient location: PACU    Anesthesia Type: general    Transport from OR: Transported from OR on room air with adequate spontaneous ventilation    Post pain: adequate analgesia    Post assessment: no apparent anesthetic complications    Post vital signs: stable    Level of consciousness: awake and alert    Nausea/Vomiting: no nausea/vomiting    Complications: none    Transfer of care protocol was followed    Last vitals: Visit Vitals  /67 (BP Location: Left arm, Patient Position: Lying)   Pulse 90   Temp 36.4 °C (97.6 °F) (Skin)   Resp 16   Wt 53.1 kg (117 lb)   SpO2 100%   BMI 17.28 kg/m²

## 2025-07-18 NOTE — PLAN OF CARE
Released from PACU per Anesthesia when criteria met. NAD. VSS. AAOx4. Pt states no pain at this time. No N/V. Dsg dry, intact. Instructed pt on IS, encouraged use, cough, and deep breathing. To post-op with all belongings.

## 2025-07-19 LAB — BACTERIA BLD CULT: NORMAL

## 2025-07-21 ENCOUNTER — TELEPHONE (OUTPATIENT)
Dept: HEMATOLOGY/ONCOLOGY | Facility: CLINIC | Age: 65
End: 2025-07-21
Payer: MEDICARE

## 2025-07-21 ENCOUNTER — SOCIAL WORK (OUTPATIENT)
Dept: HEMATOLOGY/ONCOLOGY | Facility: CLINIC | Age: 65
End: 2025-07-21

## 2025-07-21 ENCOUNTER — OFFICE VISIT (OUTPATIENT)
Dept: HEMATOLOGY/ONCOLOGY | Facility: CLINIC | Age: 65
End: 2025-07-21
Payer: MEDICARE

## 2025-07-21 ENCOUNTER — TELEPHONE (OUTPATIENT)
Facility: CLINIC | Age: 65
End: 2025-07-21
Payer: MEDICARE

## 2025-07-21 ENCOUNTER — LAB VISIT (OUTPATIENT)
Dept: LAB | Facility: HOSPITAL | Age: 65
End: 2025-07-21
Attending: INTERNAL MEDICINE
Payer: MEDICARE

## 2025-07-21 ENCOUNTER — TELEPHONE (OUTPATIENT)
Dept: HEMATOLOGY/ONCOLOGY | Facility: CLINIC | Age: 65
End: 2025-07-21

## 2025-07-21 VITALS
HEIGHT: 69 IN | DIASTOLIC BLOOD PRESSURE: 74 MMHG | WEIGHT: 120.81 LBS | HEART RATE: 114 BPM | OXYGEN SATURATION: 99 % | BODY MASS INDEX: 17.89 KG/M2 | RESPIRATION RATE: 16 BRPM | SYSTOLIC BLOOD PRESSURE: 137 MMHG | TEMPERATURE: 98 F

## 2025-07-21 DIAGNOSIS — R63.4 LOSS OF WEIGHT: ICD-10-CM

## 2025-07-21 DIAGNOSIS — C34.31 MALIGNANT NEOPLASM OF LOWER LOBE OF RIGHT LUNG: ICD-10-CM

## 2025-07-21 DIAGNOSIS — E22.2 SIADH (SYNDROME OF INAPPROPRIATE ADH PRODUCTION): ICD-10-CM

## 2025-07-21 DIAGNOSIS — C34.31 MALIGNANT NEOPLASM OF LOWER LOBE OF RIGHT LUNG: Primary | ICD-10-CM

## 2025-07-21 DIAGNOSIS — R05.9 COUGH IN ADULT: ICD-10-CM

## 2025-07-21 LAB
ABSOLUTE EOSINOPHIL (SMH): 0.11 K/UL
ABSOLUTE MONOCYTE (SMH): 0.81 K/UL (ref 0.3–1)
ABSOLUTE NEUTROPHIL COUNT (SMH): 4.3 K/UL (ref 1.8–7.7)
ALBUMIN SERPL-MCNC: 3 G/DL (ref 3.5–5.2)
ALP SERPL-CCNC: 108 UNIT/L (ref 55–135)
ALT SERPL-CCNC: 30 UNIT/L (ref 10–44)
ANION GAP (SMH): 8 MMOL/L (ref 8–16)
AST SERPL-CCNC: 25 UNIT/L (ref 10–40)
BASOPHILS # BLD AUTO: 0.04 K/UL
BASOPHILS NFR BLD AUTO: 0.6 %
BILIRUB SERPL-MCNC: 0.2 MG/DL (ref 0.1–1)
BUN SERPL-MCNC: 20 MG/DL (ref 8–23)
CALCIUM SERPL-MCNC: 8.7 MG/DL (ref 8.7–10.5)
CHLORIDE SERPL-SCNC: 99 MMOL/L (ref 95–110)
CO2 SERPL-SCNC: 22 MMOL/L (ref 23–29)
CREAT SERPL-MCNC: 0.5 MG/DL (ref 0.5–1.4)
ERYTHROCYTE [DISTWIDTH] IN BLOOD BY AUTOMATED COUNT: 15.1 % (ref 11.5–14.5)
GFR SERPLBLD CREATININE-BSD FMLA CKD-EPI: >60 ML/MIN/1.73/M2
GLUCOSE SERPL-MCNC: 149 MG/DL (ref 70–110)
HCT VFR BLD AUTO: 30.6 % (ref 40–54)
HGB BLD-MCNC: 9.8 GM/DL (ref 14–18)
IMM GRANULOCYTES # BLD AUTO: 0.06 K/UL (ref 0–0.04)
IMM GRANULOCYTES NFR BLD AUTO: 0.9 % (ref 0–0.5)
LYMPHOCYTES # BLD AUTO: 1.31 K/UL (ref 1–4.8)
MAGNESIUM SERPL-MCNC: 1.9 MG/DL (ref 1.6–2.6)
MCH RBC QN AUTO: 26.8 PG (ref 27–31)
MCHC RBC AUTO-ENTMCNC: 32 G/DL (ref 32–36)
MCV RBC AUTO: 84 FL (ref 82–98)
NUCLEATED RBC (/100WBC) (SMH): 0 /100 WBC
PLATELET # BLD AUTO: 512 K/UL (ref 150–450)
PMV BLD AUTO: 7.9 FL (ref 9.2–12.9)
POTASSIUM SERPL-SCNC: 4.2 MMOL/L (ref 3.5–5.1)
PROT SERPL-MCNC: 6.9 GM/DL (ref 6–8.4)
RBC # BLD AUTO: 3.65 M/UL (ref 4.6–6.2)
RELATIVE EOSINOPHIL (SMH): 1.6 % (ref 0–8)
RELATIVE LYMPHOCYTE (SMH): 19.6 % (ref 18–48)
RELATIVE MONOCYTE (SMH): 12.1 % (ref 4–15)
RELATIVE NEUTROPHIL (SMH): 65.2 % (ref 38–73)
SODIUM SERPL-SCNC: 129 MMOL/L (ref 136–145)
WBC # BLD AUTO: 6.67 K/UL (ref 3.9–12.7)

## 2025-07-21 PROCEDURE — 1111F DSCHRG MED/CURRENT MED MERGE: CPT | Mod: CPTII,S$GLB,, | Performed by: INTERNAL MEDICINE

## 2025-07-21 PROCEDURE — 1101F PT FALLS ASSESS-DOCD LE1/YR: CPT | Mod: CPTII,S$GLB,, | Performed by: INTERNAL MEDICINE

## 2025-07-21 PROCEDURE — 3288F FALL RISK ASSESSMENT DOCD: CPT | Mod: CPTII,S$GLB,, | Performed by: INTERNAL MEDICINE

## 2025-07-21 PROCEDURE — 36415 COLL VENOUS BLD VENIPUNCTURE: CPT

## 2025-07-21 PROCEDURE — 3008F BODY MASS INDEX DOCD: CPT | Mod: CPTII,S$GLB,, | Performed by: INTERNAL MEDICINE

## 2025-07-21 PROCEDURE — 1159F MED LIST DOCD IN RCRD: CPT | Mod: CPTII,S$GLB,, | Performed by: INTERNAL MEDICINE

## 2025-07-21 PROCEDURE — 3078F DIAST BP <80 MM HG: CPT | Mod: CPTII,S$GLB,, | Performed by: INTERNAL MEDICINE

## 2025-07-21 PROCEDURE — 83735 ASSAY OF MAGNESIUM: CPT

## 2025-07-21 PROCEDURE — G2211 COMPLEX E/M VISIT ADD ON: HCPCS | Mod: S$GLB,,, | Performed by: INTERNAL MEDICINE

## 2025-07-21 PROCEDURE — 99215 OFFICE O/P EST HI 40 MIN: CPT | Mod: S$GLB,,, | Performed by: INTERNAL MEDICINE

## 2025-07-21 PROCEDURE — 1126F AMNT PAIN NOTED NONE PRSNT: CPT | Mod: CPTII,S$GLB,, | Performed by: INTERNAL MEDICINE

## 2025-07-21 PROCEDURE — 3075F SYST BP GE 130 - 139MM HG: CPT | Mod: CPTII,S$GLB,, | Performed by: INTERNAL MEDICINE

## 2025-07-21 PROCEDURE — 85025 COMPLETE CBC W/AUTO DIFF WBC: CPT

## 2025-07-21 PROCEDURE — 84520 ASSAY OF UREA NITROGEN: CPT

## 2025-07-21 PROCEDURE — 99999 PR PBB SHADOW E&M-EST. PATIENT-LVL III: CPT | Mod: PBBFAC,,, | Performed by: INTERNAL MEDICINE

## 2025-07-21 RX ORDER — DIPHENHYDRAMINE HYDROCHLORIDE 50 MG/ML
50 INJECTION, SOLUTION INTRAMUSCULAR; INTRAVENOUS ONCE AS NEEDED
Status: CANCELLED | OUTPATIENT
Start: 2025-07-21

## 2025-07-21 RX ORDER — SODIUM CHLORIDE 0.9 % (FLUSH) 0.9 %
10 SYRINGE (ML) INJECTION
Status: CANCELLED | OUTPATIENT
Start: 2025-07-21

## 2025-07-21 RX ORDER — HEPARIN 100 UNIT/ML
500 SYRINGE INTRAVENOUS
Status: CANCELLED | OUTPATIENT
Start: 2025-07-21

## 2025-07-21 RX ORDER — EPINEPHRINE 0.3 MG/.3ML
0.3 INJECTION SUBCUTANEOUS ONCE AS NEEDED
Status: CANCELLED | OUTPATIENT
Start: 2025-07-21

## 2025-07-21 RX ORDER — FAMOTIDINE 10 MG/ML
20 INJECTION, SOLUTION INTRAVENOUS
Status: CANCELLED | OUTPATIENT
Start: 2025-07-21

## 2025-07-21 RX ORDER — PROCHLORPERAZINE EDISYLATE 5 MG/ML
5 INJECTION INTRAMUSCULAR; INTRAVENOUS ONCE AS NEEDED
Status: CANCELLED | OUTPATIENT
Start: 2025-07-21

## 2025-07-21 NOTE — TELEPHONE ENCOUNTER
Received a message that patient is having psychosocial issues.  Called patient to discuss, no answer, voicemail left.

## 2025-07-21 NOTE — PROGRESS NOTES
HPI    64 years old male newly discovered squamous cell carcinoma of the right lung.  Active smoker.  PET scan shows no distant metastases.  MRI of the brain is negative for malignancy involvement.  Clinical staging T4 N1 M0.  Patient's saw Radiation Oncology already.  He is not a surgical candidate.  Planning on concurrent chemoradiation x6 weeks followed by immunotherapy consolidation.    Abnormal weight loss.      Past Medical History:   Diagnosis Date    Coronary artery disease     cardiac stents    Heart attack     Hepatitis C     Hypertension      Social History     Socioeconomic History    Marital status:    Tobacco Use    Smoking status: Every Day     Current packs/day: 0.50     Average packs/day: 2.0 packs/day for 40.6 years (80.8 ttl pk-yrs)     Types: Cigarettes     Start date: 1985    Tobacco comments:     Smoking on oxygen      7/1/25 pt instructed no smoking prior to sx   Substance and Sexual Activity    Alcohol use: Not Currently     Comment: quit 6/2025    Drug use: Not Currently     Social Drivers of Health     Financial Resource Strain: Low Risk  (7/15/2025)    Overall Financial Resource Strain (CARDIA)     Difficulty of Paying Living Expenses: Not very hard   Recent Concern: Financial Resource Strain - High Risk (4/26/2025)    Received from [facility] SDOH Screening     In the past year, have you been unable to get any of the following when you really needed them? choose all that apply.: Internet   Food Insecurity: No Food Insecurity (7/15/2025)    Hunger Vital Sign     Worried About Running Out of Food in the Last Year: Never true     Ran Out of Food in the Last Year: Never true   Recent Concern: Food Insecurity - High Risk (4/26/2025)    Received from [facility] SDOH Screening     In the past 2 months, did you or others you live with eat smaller meals or skip meals because you didn't have money for food?: Yes   Transportation Needs: No Transportation Needs  (7/15/2025)    PRAPARE - Transportation     Lack of Transportation (Medical): No     Lack of Transportation (Non-Medical): No   Recent Concern: Transportation Needs - High Risk (4/26/2025)    Received from [facility] SDOH Screening     Has lack of transportation kept you from medical appointments, meetings, work or from getting things needed for daily living? choose all that apply.: Yes, it has kept me from medical appointments or from getting my medications     Has lack of transportation kept you from medical appointments, meetings, work or from getting things needed for daily living? choose all that apply.: Yes, it has kept me from non-medical meetings, appointments, work or from getting things that i need   Physical Activity: Insufficiently Active (10/17/2024)    [facility] from [facility]    Exercise Vital Sign     Days of Exercise per Week: 5 days     Minutes of Exercise per Session: 20 min   Stress: No Stress Concern Present (7/15/2025)    [employer] - Occupational Stress Questionnaire     Feeling of Stress : Not at all   Housing Stability: Low Risk  (7/15/2025)    Housing Stability Vital Sign     Unable to Pay for Housing in the Last Year: No     Number of Times Moved in the Last Year: 0     Homeless in the Last Year: No         Subjective      Review of Systems   Constitutional: Negative for appetite change, fatigue and unexpected weight change.   HENT: Negative for mouth sores.   Eyes: Negative for visual disturbance.   Respiratory: Negative for cough and shortness of breath.   Cardiovascular: Negative for chest pain.   Gastrointestinal: Negative for diarrhea.   Genitourinary: Negative for frequency.   Musculoskeletal: Negative for back pain.   Skin: Negative for rash.   Neurological: Negative for headaches.   Hematological: Negative for adenopathy.   Psychiatric/Behavioral: The patient is not nervous/anxious.   All other systems  reviewed and are negative.     Objective    Physical Exam   There were no vitals filed for this visit.      Constitutional: patient is oriented to person, place, and time. patient appears well-developed and well-nourished. No distress.   HENT:   Right Ear: External ear normal.   Left Ear: External ear normal.   Nose: Nose normal.   Mouth/Throat: Oropharynx is clear and moist. No oropharyngeal exudate.   Teeth, gums and lips are normal   No sinus tenderness   Palate, tongue, posterior pharynx are normal   Eyes: Conjunctivae and lids are normal.   Neck: Trachea normal and normal range of motion. No thyromegaly   Cardiovascular: Normal rate, regular rhythm, normal heart sounds, intact distal pulses and normal pulses.   No murmur heard.   No edema, no tenderness in the extremities.   Pulmonary/Chest: Effort normal and breath sounds normal. No accessory muscle usage. patient has no wheezes..   Abdominal: Soft. Normal appearance and bowel sounds are normal. patient exhibits no distension and no mass. There is no hepatosplenomegaly. There is no tenderness.   Musculoskeletal: Normal range of motion.   Gait is normal   No clubbing, cyanosis     Lymphadenopathy:   Head (right side): No submental and no submandibular adenopathy present.   Head (left side): No submental and no submandibular adenopathy present.   patient has no cervical adenopathy.   Right: No supraclavicular adenopathy present.   Left: No supraclavicular adenopathy present.   Neurological: patient is alert and oriented to person, place, and time. patient has normal strength and normal reflexes. No sensory deficit. Gait normal.   Skin: Skin is warm, dry and intact. No bruising, no lesion and no rash noted. No cyanosis. Nails show no clubbing.   No lesions   Psychiatric: patient has a normal mood and affect. patient speech is normal and behavior is normal. Judgment normal. Cognition and memory are normal.   Vitals reviewed.     Lab Results   Component Value Date     WBC 6.67 07/21/2025    HGB 9.8 (L) 07/21/2025    HCT 30.6 (L) 07/21/2025    MCV 84 07/21/2025     (H) 07/21/2025       CMP  Sodium   Date Value Ref Range Status   07/15/2025 131 (L) 136 - 145 mmol/L Final     Potassium   Date Value Ref Range Status   07/15/2025 4.1 3.5 - 5.1 mmol/L Final     Chloride   Date Value Ref Range Status   07/15/2025 98 95 - 110 mmol/L Final     CO2   Date Value Ref Range Status   07/15/2025 24 23 - 29 mmol/L Final     Glucose   Date Value Ref Range Status   07/15/2025 115 (H) 70 - 110 mg/dL Final     BUN   Date Value Ref Range Status   07/15/2025 14 8 - 23 mg/dL Final     Creatinine   Date Value Ref Range Status   07/15/2025 0.5 0.5 - 1.4 mg/dL Final     Calcium   Date Value Ref Range Status   07/15/2025 8.4 (L) 8.7 - 10.5 mg/dL Final     Protein Total   Date Value Ref Range Status   07/15/2025 6.2 6.0 - 8.4 gm/dL Final     Albumin   Date Value Ref Range Status   07/15/2025 2.8 (L) 3.5 - 5.2 g/dL Final     Bilirubin Total   Date Value Ref Range Status   07/15/2025 0.3 0.1 - 1.0 mg/dL Final     Comment:     For infants and newborns, interpretation of results should be based   on gestational age, weight and in agreement with clinical   observations.    Premature Infant recommended reference ranges:   0-24 hours:  <8.0 mg/dL   24-48 hours: <12.0 mg/dL   3-5 days:    <15.0 mg/dL   6-29 days:   <15.0 mg/dL     ALP   Date Value Ref Range Status   07/15/2025 126 55 - 135 unit/L Final     AST   Date Value Ref Range Status   07/15/2025 22 10 - 40 unit/L Final     ALT   Date Value Ref Range Status   07/15/2025 35 10 - 44 unit/L Final     Anion Gap   Date Value Ref Range Status   07/15/2025 9 8 - 16 mmol/L Final     eGFR   Date Value Ref Range Status   07/15/2025 >60 >60 mL/min/1.73/m2 Final   02/26/2025 >60.0 >60 mL/min/1.73 m^2 Final       PEtCT  Impression:     1.  Large centrally necrotic mass in the right lung extending from the hilum into the right lower lobe compatible with  malignancy, noting this appears to opacify/obstruct the right mainstem bronchus, with postobstructive atelectasis/consolidation in the right lower lobe.     2.  Mild faint scattered tree-in-bud opacity in the adjacent right lung suggesting a combination of mucous, secretion, aspiration and or small airways disease.     3.  No PET-CT avid distant metastatic disease.      Path bronchial wash  LUNG, RIGHT, BRONCHIAL WASH:     --POSITIVE FOR SQUAMOUS CELL CARCINOMA        Assessment    Squamous cell carcinoma of the right lung.  T4 N1 M0.  Stage III a.  ECOG score 1.  Abnormal weight loss secondary to cancer.  Active tobacco use.    Nonsurgical candidate    Plan for concurrent chemoradiation involving carbo Taxol paclitaxel weekly with radiation x6 weeks.  This will be followed by consolidation of immunotherapy.      Abnormal weight loss secondary to malignancy.  Starting on Megace and referred to nutritionist.    Anemia in the setting of malignancy    Leukocytosis reactive - active     Thrombocytosis reactive -active     Hyponatremia SIADH plus dehydration - start on salt tablet 1000 mg b.i.d.+ some NS0.9 hydration with a some of improved. continue salt tablets but increase to TID.  Treating underlying problems malignancy    Low grade Temp? Patient experiencing coughing rhinitis and low-grade fever.  CBC from 07/07/2025 appear to be normal.  Do not have blood work from today yet.   Urgent care ruled out COVID if negative and blood work appear to be normal can proceed for with therapy otherwise delay the treatment times one-week.  Empirically start on Levaquin 750 mg daily    RTC 1 week with lab for treatment with lab       Plan    There are no diagnoses linked to this encounter.

## 2025-07-21 NOTE — PROGRESS NOTES
I met with patient prior to his appointment with Dr. Sheets.  He stated that his refrigerator is working and non of his food was lost.  He stated that he does not like using [employer] transportation due to them requiring 3 days notice and the company having difficulty finding his address.  His friend Suman brought him today.  He has used [employer] in the past but doesn't have a credit card/debit card with available funds.  I explained that [facility] is unable to provide ongoing cab rides and does not provide Ly[employer] assistance.  We will assist with gas cards for his friend once per month.

## 2025-07-21 NOTE — NURSING
Oncology Navigation   Intake  Type of Referral: Internal  Date of Referral: 06/20/25  Initial Nurse Navigator Contact: 06/20/25  Referral to Initial Contact Timeline (days): 0  Appointment Date: 06/23/25     Treatment                          Support Systems: Friends / neighbors  Barriers of Care: Financial concerns; Social needs  Financial Concerns: Financial hardship  Social Needs: Transportation needs; Other  Other Social Needs: caregiver needed     Acuity      Follow Up  No follow-ups on file.     Attempted to reach patient by phone to discuss labs and his clearance appointment but got no answer. Unable to leave voicemail as it says it is full. Spoke to his neighbor (Ms Will) who stated she has some work commitments today but will continue to help him when she can.

## 2025-07-21 NOTE — TELEPHONE ENCOUNTER
Called pt in regards to getting a lab appt scheduled today prior to pt appt with  at 2 PM. Pt was aware that labs are currently due, however pt stated that he's unable to get a ride to make a lab appt and will possibly miss scheduled appt with  on today. Pt further explained that he's experiencing uninhabitable conditions due to his refrigerator going out and food spoiling inside. Pt then stated if we have any housing arrangements that he can be referred to, he will like information. If there's no way our clinic can assist, pt stated that due to his circumstances he feels as though nothing else is left to do but .     Telephone encounter was relayed to the nurse verbatim.

## 2025-07-22 ENCOUNTER — TREATMENT (OUTPATIENT)
Dept: RADIATION ONCOLOGY | Facility: CLINIC | Age: 65
End: 2025-07-22
Payer: MEDICARE

## 2025-07-22 ENCOUNTER — INFUSION (OUTPATIENT)
Dept: INFUSION THERAPY | Facility: HOSPITAL | Age: 65
End: 2025-07-22
Attending: INTERNAL MEDICINE
Payer: MEDICARE

## 2025-07-22 VITALS
WEIGHT: 121.13 LBS | BODY MASS INDEX: 17.94 KG/M2 | HEIGHT: 69 IN | HEART RATE: 97 BPM | DIASTOLIC BLOOD PRESSURE: 72 MMHG | SYSTOLIC BLOOD PRESSURE: 110 MMHG | OXYGEN SATURATION: 100 % | TEMPERATURE: 98 F | RESPIRATION RATE: 18 BRPM

## 2025-07-22 DIAGNOSIS — C34.31 MALIGNANT NEOPLASM OF LOWER LOBE OF RIGHT LUNG: Primary | ICD-10-CM

## 2025-07-22 PROCEDURE — 96413 CHEMO IV INFUSION 1 HR: CPT

## 2025-07-22 PROCEDURE — 96417 CHEMO IV INFUS EACH ADDL SEQ: CPT

## 2025-07-22 PROCEDURE — 25000003 PHARM REV CODE 250: Performed by: INTERNAL MEDICINE

## 2025-07-22 PROCEDURE — A4216 STERILE WATER/SALINE, 10 ML: HCPCS | Performed by: INTERNAL MEDICINE

## 2025-07-22 PROCEDURE — G6015 RADIATION TX DELIVERY IMRT: HCPCS | Mod: S$GLB,,, | Performed by: RADIOLOGY

## 2025-07-22 PROCEDURE — 77014 PR  CT GUIDANCE PLACEMENT RAD THERAPY FIELDS: CPT | Mod: S$GLB,,, | Performed by: RADIOLOGY

## 2025-07-22 PROCEDURE — 63600175 PHARM REV CODE 636 W HCPCS: Performed by: INTERNAL MEDICINE

## 2025-07-22 PROCEDURE — 96375 TX/PRO/DX INJ NEW DRUG ADDON: CPT

## 2025-07-22 PROCEDURE — 96367 TX/PROPH/DG ADDL SEQ IV INF: CPT

## 2025-07-22 RX ORDER — SODIUM CHLORIDE 0.9 % (FLUSH) 0.9 %
10 SYRINGE (ML) INJECTION
Status: DISCONTINUED | OUTPATIENT
Start: 2025-07-22 | End: 2025-07-22 | Stop reason: HOSPADM

## 2025-07-22 RX ORDER — EPINEPHRINE 0.3 MG/.3ML
0.3 INJECTION SUBCUTANEOUS ONCE AS NEEDED
Status: DISCONTINUED | OUTPATIENT
Start: 2025-07-22 | End: 2025-07-22 | Stop reason: HOSPADM

## 2025-07-22 RX ORDER — DIPHENHYDRAMINE HYDROCHLORIDE 50 MG/ML
50 INJECTION, SOLUTION INTRAMUSCULAR; INTRAVENOUS ONCE AS NEEDED
Status: DISCONTINUED | OUTPATIENT
Start: 2025-07-22 | End: 2025-07-22 | Stop reason: HOSPADM

## 2025-07-22 RX ORDER — FAMOTIDINE 10 MG/ML
20 INJECTION, SOLUTION INTRAVENOUS
Status: COMPLETED | OUTPATIENT
Start: 2025-07-22 | End: 2025-07-22

## 2025-07-22 RX ORDER — HEPARIN 100 UNIT/ML
500 SYRINGE INTRAVENOUS
Status: DISCONTINUED | OUTPATIENT
Start: 2025-07-22 | End: 2025-07-22 | Stop reason: HOSPADM

## 2025-07-22 RX ADMIN — CARBOPLATIN 270 MG: 450 INJECTION, SOLUTION INTRAVENOUS at 10:07

## 2025-07-22 RX ADMIN — SODIUM CHLORIDE: 9 INJECTION, SOLUTION INTRAVENOUS at 08:07

## 2025-07-22 RX ADMIN — DEXAMETHASONE SODIUM PHOSPHATE 0.25 MG: 4 INJECTION, SOLUTION INTRA-ARTICULAR; INTRALESIONAL; INTRAMUSCULAR; INTRAVENOUS; SOFT TISSUE at 08:07

## 2025-07-22 RX ADMIN — FAMOTIDINE 20 MG: 10 INJECTION INTRAVENOUS at 08:07

## 2025-07-22 RX ADMIN — SODIUM CHLORIDE 72 MG: 9 INJECTION, SOLUTION INTRAVENOUS at 09:07

## 2025-07-22 RX ADMIN — Medication 10 ML: at 11:07

## 2025-07-22 RX ADMIN — DIPHENHYDRAMINE HYDROCHLORIDE 50 MG: 50 INJECTION INTRAMUSCULAR; INTRAVENOUS at 09:07

## 2025-07-22 RX ADMIN — HEPARIN 500 UNITS: 100 SYRINGE at 11:07

## 2025-07-22 NOTE — PLAN OF CARE
Problem: Fatigue  Goal: Improved Activity Tolerance  Outcome: Progressing  Intervention: Promote Improved Energy  Flowsheets (Taken 7/22/2025 2657)  Fatigue Management: frequent rest breaks encouraged  Sleep/Rest Enhancement: regular sleep/rest pattern promoted  Activity Management:   Ambulated -L4   Up in chair - L3  Environmental Support:   calm environment promoted   rest periods encouraged

## 2025-07-22 NOTE — TELEPHONE ENCOUNTER
Met with patient to inquire if he wanted his food pantry items.  He stated that he had enough food and did not want it.  Patient stated that he as able to get his rides scheduled for treatment.  I inquired about patient wanting information on nursing home placement; he responded not at this time.  Patient stated that his friend provides him with a place to live in exchange for odd jobs.  No additional supports requested at this time.

## 2025-07-25 ENCOUNTER — LAB VISIT (OUTPATIENT)
Dept: LAB | Facility: HOSPITAL | Age: 65
End: 2025-07-25
Attending: INTERNAL MEDICINE
Payer: MEDICARE

## 2025-07-25 DIAGNOSIS — C34.31 MALIGNANT NEOPLASM OF LOWER LOBE OF RIGHT LUNG: ICD-10-CM

## 2025-07-25 LAB
ABSOLUTE EOSINOPHIL (SMH): 0.17 K/UL
ABSOLUTE MONOCYTE (SMH): 0.26 K/UL (ref 0.3–1)
ABSOLUTE NEUTROPHIL COUNT (SMH): 6.7 K/UL (ref 1.8–7.7)
ALBUMIN SERPL-MCNC: 3.2 G/DL (ref 3.5–5.2)
ALP SERPL-CCNC: 97 UNIT/L (ref 55–135)
ALT SERPL-CCNC: 40 UNIT/L (ref 10–44)
ANION GAP (SMH): 9 MMOL/L (ref 8–16)
AST SERPL-CCNC: 16 UNIT/L (ref 10–40)
BASOPHILS # BLD AUTO: 0.05 K/UL
BASOPHILS NFR BLD AUTO: 0.6 %
BILIRUB SERPL-MCNC: 0.3 MG/DL (ref 0.1–1)
BUN SERPL-MCNC: 17 MG/DL (ref 8–23)
CALCIUM SERPL-MCNC: 8.7 MG/DL (ref 8.7–10.5)
CHLORIDE SERPL-SCNC: 97 MMOL/L (ref 95–110)
CO2 SERPL-SCNC: 25 MMOL/L (ref 23–29)
CREAT SERPL-MCNC: 0.5 MG/DL (ref 0.5–1.4)
ERYTHROCYTE [DISTWIDTH] IN BLOOD BY AUTOMATED COUNT: 15.2 % (ref 11.5–14.5)
GFR SERPLBLD CREATININE-BSD FMLA CKD-EPI: >60 ML/MIN/1.73/M2
GLUCOSE SERPL-MCNC: 153 MG/DL (ref 70–110)
HCT VFR BLD AUTO: 34.2 % (ref 40–54)
HGB BLD-MCNC: 10.6 GM/DL (ref 14–18)
IMM GRANULOCYTES # BLD AUTO: 0.06 K/UL (ref 0–0.04)
IMM GRANULOCYTES NFR BLD AUTO: 0.8 % (ref 0–0.5)
LYMPHOCYTES # BLD AUTO: 0.56 K/UL (ref 1–4.8)
MAGNESIUM SERPL-MCNC: 2.1 MG/DL (ref 1.6–2.6)
MCH RBC QN AUTO: 26.4 PG (ref 27–31)
MCHC RBC AUTO-ENTMCNC: 31 G/DL (ref 32–36)
MCV RBC AUTO: 85 FL (ref 82–98)
NUCLEATED RBC (/100WBC) (SMH): 0 /100 WBC
PLATELET # BLD AUTO: 462 K/UL (ref 150–450)
PMV BLD AUTO: 8.4 FL (ref 9.2–12.9)
POTASSIUM SERPL-SCNC: 3.8 MMOL/L (ref 3.5–5.1)
PROT SERPL-MCNC: 7.5 GM/DL (ref 6–8.4)
RBC # BLD AUTO: 4.01 M/UL (ref 4.6–6.2)
RELATIVE EOSINOPHIL (SMH): 2.2 % (ref 0–8)
RELATIVE LYMPHOCYTE (SMH): 7.2 % (ref 18–48)
RELATIVE MONOCYTE (SMH): 3.3 % (ref 4–15)
RELATIVE NEUTROPHIL (SMH): 85.9 % (ref 38–73)
SODIUM SERPL-SCNC: 131 MMOL/L (ref 136–145)
WBC # BLD AUTO: 7.82 K/UL (ref 3.9–12.7)

## 2025-07-25 PROCEDURE — 84075 ASSAY ALKALINE PHOSPHATASE: CPT

## 2025-07-25 PROCEDURE — 85025 COMPLETE CBC W/AUTO DIFF WBC: CPT

## 2025-07-25 PROCEDURE — 36415 COLL VENOUS BLD VENIPUNCTURE: CPT

## 2025-07-25 PROCEDURE — 83735 ASSAY OF MAGNESIUM: CPT

## 2025-07-28 ENCOUNTER — SOCIAL WORK (OUTPATIENT)
Dept: HEMATOLOGY/ONCOLOGY | Facility: CLINIC | Age: 65
End: 2025-07-28

## 2025-07-28 ENCOUNTER — OFFICE VISIT (OUTPATIENT)
Dept: HEMATOLOGY/ONCOLOGY | Facility: CLINIC | Age: 65
End: 2025-07-28
Payer: MEDICARE

## 2025-07-28 VITALS
BODY MASS INDEX: 17.27 KG/M2 | SYSTOLIC BLOOD PRESSURE: 116 MMHG | DIASTOLIC BLOOD PRESSURE: 72 MMHG | WEIGHT: 116.63 LBS | RESPIRATION RATE: 18 BRPM | HEIGHT: 69 IN | TEMPERATURE: 98 F | HEART RATE: 123 BPM | OXYGEN SATURATION: 97 %

## 2025-07-28 DIAGNOSIS — R10.9 PAIN IN ABDOMEN ON PALPATION: ICD-10-CM

## 2025-07-28 DIAGNOSIS — D64.81 ANEMIA ASSOCIATED WITH CHEMOTHERAPY: ICD-10-CM

## 2025-07-28 DIAGNOSIS — E22.2 SIADH (SYNDROME OF INAPPROPRIATE ADH PRODUCTION): ICD-10-CM

## 2025-07-28 DIAGNOSIS — D63.0 ANEMIA IN NEOPLASTIC DISEASE: ICD-10-CM

## 2025-07-28 DIAGNOSIS — C77.1 MALIGNANT NEOPLASM METASTATIC TO INTRATHORACIC LYMPH NODE: ICD-10-CM

## 2025-07-28 DIAGNOSIS — R53.0 NEOPLASTIC MALIGNANT RELATED FATIGUE: ICD-10-CM

## 2025-07-28 DIAGNOSIS — R63.4 LOSS OF WEIGHT: ICD-10-CM

## 2025-07-28 DIAGNOSIS — C34.31 MALIGNANT NEOPLASM OF LOWER LOBE OF RIGHT LUNG: Primary | ICD-10-CM

## 2025-07-28 DIAGNOSIS — D75.838 REACTIVE THROMBOCYTOSIS: ICD-10-CM

## 2025-07-28 DIAGNOSIS — E43 SEVERE PROTEIN-CALORIE MALNUTRITION: ICD-10-CM

## 2025-07-28 DIAGNOSIS — T45.1X5A ANEMIA ASSOCIATED WITH CHEMOTHERAPY: ICD-10-CM

## 2025-07-28 PROCEDURE — 1111F DSCHRG MED/CURRENT MED MERGE: CPT | Mod: CPTII,S$GLB,, | Performed by: INTERNAL MEDICINE

## 2025-07-28 PROCEDURE — G2211 COMPLEX E/M VISIT ADD ON: HCPCS | Mod: S$GLB,,, | Performed by: INTERNAL MEDICINE

## 2025-07-28 PROCEDURE — 99999 PR PBB SHADOW E&M-EST. PATIENT-LVL III: CPT | Mod: PBBFAC,,, | Performed by: INTERNAL MEDICINE

## 2025-07-28 PROCEDURE — 99215 OFFICE O/P EST HI 40 MIN: CPT | Mod: S$GLB,,, | Performed by: INTERNAL MEDICINE

## 2025-07-28 PROCEDURE — 3288F FALL RISK ASSESSMENT DOCD: CPT | Mod: CPTII,S$GLB,, | Performed by: INTERNAL MEDICINE

## 2025-07-28 PROCEDURE — 3008F BODY MASS INDEX DOCD: CPT | Mod: CPTII,S$GLB,, | Performed by: INTERNAL MEDICINE

## 2025-07-28 PROCEDURE — 1125F AMNT PAIN NOTED PAIN PRSNT: CPT | Mod: CPTII,S$GLB,, | Performed by: INTERNAL MEDICINE

## 2025-07-28 PROCEDURE — 1101F PT FALLS ASSESS-DOCD LE1/YR: CPT | Mod: CPTII,S$GLB,, | Performed by: INTERNAL MEDICINE

## 2025-07-28 PROCEDURE — 1159F MED LIST DOCD IN RCRD: CPT | Mod: CPTII,S$GLB,, | Performed by: INTERNAL MEDICINE

## 2025-07-28 PROCEDURE — 3078F DIAST BP <80 MM HG: CPT | Mod: CPTII,S$GLB,, | Performed by: INTERNAL MEDICINE

## 2025-07-28 PROCEDURE — 3074F SYST BP LT 130 MM HG: CPT | Mod: CPTII,S$GLB,, | Performed by: INTERNAL MEDICINE

## 2025-07-28 RX ORDER — SODIUM CHLORIDE 0.9 % (FLUSH) 0.9 %
10 SYRINGE (ML) INJECTION
Status: CANCELLED | OUTPATIENT
Start: 2025-07-28

## 2025-07-28 RX ORDER — EPINEPHRINE 0.3 MG/.3ML
0.3 INJECTION SUBCUTANEOUS ONCE AS NEEDED
Status: CANCELLED | OUTPATIENT
Start: 2025-07-28

## 2025-07-28 RX ORDER — PROCHLORPERAZINE EDISYLATE 5 MG/ML
5 INJECTION INTRAMUSCULAR; INTRAVENOUS ONCE AS NEEDED
Status: CANCELLED | OUTPATIENT
Start: 2025-07-28

## 2025-07-28 RX ORDER — FAMOTIDINE 10 MG/ML
20 INJECTION, SOLUTION INTRAVENOUS
Status: CANCELLED | OUTPATIENT
Start: 2025-07-28

## 2025-07-28 RX ORDER — DIPHENHYDRAMINE HYDROCHLORIDE 50 MG/ML
50 INJECTION, SOLUTION INTRAMUSCULAR; INTRAVENOUS ONCE AS NEEDED
Status: CANCELLED | OUTPATIENT
Start: 2025-07-28

## 2025-07-28 RX ORDER — HEPARIN 100 UNIT/ML
500 SYRINGE INTRAVENOUS
Status: CANCELLED | OUTPATIENT
Start: 2025-07-28

## 2025-07-28 NOTE — PROGRESS NOTES
HPI    64 years old male newly discovered squamous cell carcinoma of the right lung.  Active smoker.  PET scan shows no distant metastases.  MRI of the brain is negative for malignancy involvement.  Clinical staging T4 N1 M0.  Patient's saw Radiation Oncology already.  He is not a surgical candidate.  Planning on concurrent chemoradiation x6 weeks followed by immunotherapy consolidation.    Abnormal weight loss.      Past Medical History:   Diagnosis Date    Coronary artery disease     cardiac stents    Heart attack     Hepatitis C     Hypertension      Social History     Socioeconomic History    Marital status:    Tobacco Use    Smoking status: Every Day     Current packs/day: 0.50     Average packs/day: 2.0 packs/day for 40.6 years (80.8 ttl pk-yrs)     Types: Cigarettes     Start date: 1985    Tobacco comments:     Smoking on oxygen      7/1/25 pt instructed no smoking prior to sx   Substance and Sexual Activity    Alcohol use: Not Currently     Comment: quit 6/2025    Drug use: Not Currently     Social Drivers of Health     Financial Resource Strain: Low Risk  (7/15/2025)    Overall Financial Resource Strain (CARDIA)     Difficulty of Paying Living Expenses: Not very hard   Recent Concern: Financial Resource Strain - High Risk (4/26/2025)    Received from [facility] SDOH Screening     In the past year, have you been unable to get any of the following when you really needed them? choose all that apply.: Internet   Food Insecurity: No Food Insecurity (7/15/2025)    Hunger Vital Sign     Worried About Running Out of Food in the Last Year: Never true     Ran Out of Food in the Last Year: Never true   Recent Concern: Food Insecurity - High Risk (4/26/2025)    Received from [facility] SDOH Screening     In the past 2 months, did you or others you live with eat smaller meals or skip meals because you didn't have money for food?: Yes   Transportation Needs: No Transportation Needs  (7/15/2025)    PRAPARE - Transportation     Lack of Transportation (Medical): No     Lack of Transportation (Non-Medical): No   Recent Concern: Transportation Needs - High Risk (4/26/2025)    Received from [facility] SDOH Screening     Has lack of transportation kept you from medical appointments, meetings, work or from getting things needed for daily living? choose all that apply.: Yes, it has kept me from medical appointments or from getting my medications     Has lack of transportation kept you from medical appointments, meetings, work or from getting things needed for daily living? choose all that apply.: Yes, it has kept me from non-medical meetings, appointments, work or from getting things that i need   Physical Activity: Insufficiently Active (10/17/2024)    Received from [facility]    Exercise Vital Sign     Days of Exercise per Week: 5 days     Minutes of Exercise per Session: 20 min   Stress: No Stress Concern Present (7/15/2025)    [employer] - Occupational Stress Questionnaire     Feeling of Stress : Not at all   Housing Stability: Low Risk  (7/15/2025)    Housing Stability Vital Sign     Unable to Pay for Housing in the Last Year: No     Number of Times Moved in the Last Year: 0     Homeless in the Last Year: No         Objective    Physical Exam     Vitals:    07/28/25 1344   Resp: 18     Awake alert no acute distress  Malnutrition  Normal rate  Normal respiratory effort  Nonfocal    Lab Results   Component Value Date    WBC 7.82 07/25/2025    HGB 10.6 (L) 07/25/2025    HCT 34.2 (L) 07/25/2025    MCV 85 07/25/2025     (H) 07/25/2025       CMP  Sodium   Date Value Ref Range Status   07/25/2025 131 (L) 136 - 145 mmol/L Final     Potassium   Date Value Ref Range Status   07/25/2025 3.8 3.5 - 5.1 mmol/L Final     Chloride   Date Value Ref Range Status   07/25/2025 97 95 - 110 mmol/L Final     CO2   Date Value Ref Range  Status   07/25/2025 25 23 - 29 mmol/L Final     Glucose   Date Value Ref Range Status   07/25/2025 153 (H) 70 - 110 mg/dL Final     BUN   Date Value Ref Range Status   07/25/2025 17 8 - 23 mg/dL Final     Creatinine   Date Value Ref Range Status   07/25/2025 0.5 0.5 - 1.4 mg/dL Final     Calcium   Date Value Ref Range Status   07/25/2025 8.7 8.7 - 10.5 mg/dL Final     Protein Total   Date Value Ref Range Status   07/25/2025 7.5 6.0 - 8.4 gm/dL Final     Albumin   Date Value Ref Range Status   07/25/2025 3.2 (L) 3.5 - 5.2 g/dL Final     Bilirubin Total   Date Value Ref Range Status   07/25/2025 0.3 0.1 - 1.0 mg/dL Final     Comment:     For infants and newborns, interpretation of results should be based   on gestational age, weight and in agreement with clinical   observations.    Premature Infant recommended reference ranges:   0-24 hours:  <8.0 mg/dL   24-48 hours: <12.0 mg/dL   3-5 days:    <15.0 mg/dL   6-29 days:   <15.0 mg/dL     ALP   Date Value Ref Range Status   07/25/2025 97 55 - 135 unit/L Final     AST   Date Value Ref Range Status   07/25/2025 16 10 - 40 unit/L Final     ALT   Date Value Ref Range Status   07/25/2025 40 10 - 44 unit/L Final     Anion Gap   Date Value Ref Range Status   07/25/2025 9 8 - 16 mmol/L Final     eGFR   Date Value Ref Range Status   07/25/2025 >60 >60 mL/min/1.73/m2 Final   02/26/2025 >60.0 >60 mL/min/1.73 m^2 Final       PEtCT  Impression:     1.  Large centrally necrotic mass in the right lung extending from the hilum into the right lower lobe compatible with malignancy, noting this appears to opacify/obstruct the right mainstem bronchus, with postobstructive atelectasis/consolidation in the right lower lobe.     2.  Mild faint scattered tree-in-bud opacity in the adjacent right lung suggesting a combination of mucous, secretion, aspiration and or small airways disease.     3.  No PET-CT avid distant metastatic disease.      Path bronchial wash  LUNG, RIGHT, BRONCHIAL WASH:      --POSITIVE FOR SQUAMOUS CELL CARCINOMA        Assessment    Squamous cell carcinoma of the right lung.  T4 N1 M0.  Stage III a.  ECOG score 1.  Abnormal weight loss secondary to cancer.  Active tobacco use.    Nonsurgical candidate    Plan for concurrent chemoradiation involving carbo Taxol paclitaxel weekly with radiation x6 weeks.  This will be followed by consolidation of immunotherapy.      Abnormal weight loss secondary to malignancy.  Starting on Megace and referred to nutritionist.    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 continue salt tablets but increase to TID.  Treating underlying problems malignancy    Abdomen pain non-specific tender on touch/but nonobstructive no fever (chronic x 3-4 years)- CT abdomen pelvis with IV contrast           Plan    There are no diagnoses linked to this encounter.

## 2025-07-28 NOTE — PROGRESS NOTES
Patient's transportation did not come to pick him up; I contacted [employer].  The [employer] company will pick him up; fee to be paid by [facility].

## 2025-07-29 ENCOUNTER — SOCIAL WORK (OUTPATIENT)
Dept: HEMATOLOGY/ONCOLOGY | Facility: CLINIC | Age: 65
End: 2025-07-29
Payer: MEDICARE

## 2025-07-29 ENCOUNTER — INFUSION (OUTPATIENT)
Dept: INFUSION THERAPY | Facility: HOSPITAL | Age: 65
End: 2025-07-29
Attending: INTERNAL MEDICINE
Payer: MEDICARE

## 2025-07-29 ENCOUNTER — TREATMENT (OUTPATIENT)
Dept: RADIATION ONCOLOGY | Facility: CLINIC | Age: 65
End: 2025-07-29
Payer: MEDICARE

## 2025-07-29 VITALS
BODY MASS INDEX: 17.23 KG/M2 | DIASTOLIC BLOOD PRESSURE: 73 MMHG | OXYGEN SATURATION: 95 % | HEIGHT: 69 IN | HEART RATE: 97 BPM | RESPIRATION RATE: 16 BRPM | SYSTOLIC BLOOD PRESSURE: 105 MMHG | WEIGHT: 116.31 LBS | TEMPERATURE: 97 F

## 2025-07-29 DIAGNOSIS — C34.31 MALIGNANT NEOPLASM OF LOWER LOBE OF RIGHT LUNG: Primary | ICD-10-CM

## 2025-07-29 PROCEDURE — 96367 TX/PROPH/DG ADDL SEQ IV INF: CPT

## 2025-07-29 PROCEDURE — 96375 TX/PRO/DX INJ NEW DRUG ADDON: CPT

## 2025-07-29 PROCEDURE — 96417 CHEMO IV INFUS EACH ADDL SEQ: CPT

## 2025-07-29 PROCEDURE — G6015 RADIATION TX DELIVERY IMRT: HCPCS | Mod: S$GLB,,, | Performed by: RADIOLOGY

## 2025-07-29 PROCEDURE — 96413 CHEMO IV INFUSION 1 HR: CPT

## 2025-07-29 PROCEDURE — 63600175 PHARM REV CODE 636 W HCPCS: Performed by: INTERNAL MEDICINE

## 2025-07-29 PROCEDURE — 25000003 PHARM REV CODE 250: Performed by: INTERNAL MEDICINE

## 2025-07-29 PROCEDURE — A4216 STERILE WATER/SALINE, 10 ML: HCPCS | Performed by: INTERNAL MEDICINE

## 2025-07-29 PROCEDURE — 77014 PR  CT GUIDANCE PLACEMENT RAD THERAPY FIELDS: CPT | Mod: S$GLB,,, | Performed by: RADIOLOGY

## 2025-07-29 RX ORDER — EPINEPHRINE 0.3 MG/.3ML
0.3 INJECTION SUBCUTANEOUS ONCE AS NEEDED
Status: DISCONTINUED | OUTPATIENT
Start: 2025-07-29 | End: 2025-07-29 | Stop reason: HOSPADM

## 2025-07-29 RX ORDER — FAMOTIDINE 10 MG/ML
20 INJECTION, SOLUTION INTRAVENOUS
Status: COMPLETED | OUTPATIENT
Start: 2025-07-29 | End: 2025-07-29

## 2025-07-29 RX ORDER — SODIUM CHLORIDE 0.9 % (FLUSH) 0.9 %
10 SYRINGE (ML) INJECTION
Status: DISCONTINUED | OUTPATIENT
Start: 2025-07-29 | End: 2025-07-29 | Stop reason: HOSPADM

## 2025-07-29 RX ORDER — DIPHENHYDRAMINE HYDROCHLORIDE 50 MG/ML
50 INJECTION, SOLUTION INTRAMUSCULAR; INTRAVENOUS ONCE AS NEEDED
Status: DISCONTINUED | OUTPATIENT
Start: 2025-07-29 | End: 2025-07-29 | Stop reason: HOSPADM

## 2025-07-29 RX ORDER — HEPARIN 100 UNIT/ML
500 SYRINGE INTRAVENOUS
Status: DISCONTINUED | OUTPATIENT
Start: 2025-07-29 | End: 2025-07-29 | Stop reason: HOSPADM

## 2025-07-29 RX ORDER — PROCHLORPERAZINE EDISYLATE 5 MG/ML
5 INJECTION INTRAMUSCULAR; INTRAVENOUS ONCE AS NEEDED
Status: DISCONTINUED | OUTPATIENT
Start: 2025-07-29 | End: 2025-07-29 | Stop reason: HOSPADM

## 2025-07-29 RX ADMIN — CARBOPLATIN 270 MG: 10 INJECTION, SOLUTION INTRAVENOUS at 11:07

## 2025-07-29 RX ADMIN — SODIUM CHLORIDE: 9 INJECTION, SOLUTION INTRAVENOUS at 09:07

## 2025-07-29 RX ADMIN — HEPARIN 500 UNITS: 100 SYRINGE at 12:07

## 2025-07-29 RX ADMIN — DIPHENHYDRAMINE HYDROCHLORIDE 50 MG: 50 INJECTION INTRAMUSCULAR; INTRAVENOUS at 09:07

## 2025-07-29 RX ADMIN — SODIUM CHLORIDE, PRESERVATIVE FREE 10 ML: 5 INJECTION INTRAVENOUS at 12:07

## 2025-07-29 RX ADMIN — FAMOTIDINE 20 MG: 10 INJECTION INTRAVENOUS at 09:07

## 2025-07-29 RX ADMIN — DEXAMETHASONE SODIUM PHOSPHATE 0.25 MG: 4 INJECTION, SOLUTION INTRA-ARTICULAR; INTRALESIONAL; INTRAMUSCULAR; INTRAVENOUS; SOFT TISSUE at 09:07

## 2025-07-29 RX ADMIN — SODIUM CHLORIDE 72 MG: 9 INJECTION, SOLUTION INTRAVENOUS at 10:07

## 2025-07-29 NOTE — PROGRESS NOTES
Patient continues to request frozen meals be delivered to is home.  I explained to him that the 14 meals that were mailed to him is a benefit he receives after hospital admission and it is only 14 meals.  I completed a [employer] application for their meal delivery program.

## 2025-07-29 NOTE — PLAN OF CARE
Problem: Fatigue  Goal: Improved Activity Tolerance  Outcome: Progressing  Intervention: Promote Improved Energy  Flowsheets (Taken 7/29/2025 9914)  Fatigue Management: frequent rest breaks encouraged  Activity Management: Ambulated -L4  Environmental Support: rest periods encouraged

## 2025-07-30 DIAGNOSIS — Z71.3 NUTRITIONAL COUNSELING: ICD-10-CM

## 2025-07-30 DIAGNOSIS — C34.31 MALIGNANT NEOPLASM OF LOWER LOBE OF RIGHT LUNG: ICD-10-CM

## 2025-07-30 DIAGNOSIS — C34.90 MALIGNANT NEOPLASM OF LUNG, UNSPECIFIED LATERALITY, UNSPECIFIED PART OF LUNG: ICD-10-CM

## 2025-07-30 DIAGNOSIS — R63.4 LOSS OF WEIGHT: ICD-10-CM

## 2025-07-30 RX ORDER — SODIUM CHLORIDE 1 G/1
1000 TABLET ORAL 2 TIMES DAILY
Qty: 60 TABLET | Refills: 0 | Status: SHIPPED | OUTPATIENT
Start: 2025-07-30

## 2025-07-31 ENCOUNTER — SOCIAL WORK (OUTPATIENT)
Dept: HEMATOLOGY/ONCOLOGY | Facility: CLINIC | Age: 65
End: 2025-07-31
Payer: MEDICARE

## 2025-07-31 NOTE — PROGRESS NOTES
I emailed Ewa millard [facility] regarding patient asking for more meals to be delivered to his home.  She stated that he has received a shipment of meals, gift cards and [employer].  He will only receive another 12 meals from them as he has reached his max in assistance from that agency.  I will provide patient with that information.

## 2025-08-01 ENCOUNTER — HOSPITAL ENCOUNTER (OUTPATIENT)
Dept: RADIOLOGY | Facility: HOSPITAL | Age: 65
Discharge: HOME | End: 2025-08-01
Attending: INTERNAL MEDICINE
Payer: MEDICARE

## 2025-08-01 DIAGNOSIS — R10.9 PAIN IN ABDOMEN ON PALPATION: ICD-10-CM

## 2025-08-01 PROCEDURE — 25500020 PHARM REV CODE 255: Performed by: INTERNAL MEDICINE

## 2025-08-01 PROCEDURE — 74177 CT ABD & PELVIS W/CONTRAST: CPT | Mod: 26,,, | Performed by: RADIOLOGY

## 2025-08-01 PROCEDURE — 74177 CT ABD & PELVIS W/CONTRAST: CPT | Mod: TC

## 2025-08-01 RX ADMIN — IOHEXOL 100 ML: 350 INJECTION, SOLUTION INTRAVENOUS at 09:08

## 2025-08-04 ENCOUNTER — TELEPHONE (OUTPATIENT)
Dept: HEMATOLOGY/ONCOLOGY | Facility: CLINIC | Age: 65
End: 2025-08-04
Payer: MEDICARE

## 2025-08-04 ENCOUNTER — OFFICE VISIT (OUTPATIENT)
Dept: HEMATOLOGY/ONCOLOGY | Facility: CLINIC | Age: 65
End: 2025-08-04
Payer: MEDICARE

## 2025-08-04 VITALS
HEART RATE: 125 BPM | BODY MASS INDEX: 17.4 KG/M2 | TEMPERATURE: 97 F | DIASTOLIC BLOOD PRESSURE: 68 MMHG | WEIGHT: 117.5 LBS | HEIGHT: 69 IN | SYSTOLIC BLOOD PRESSURE: 109 MMHG | OXYGEN SATURATION: 98 %

## 2025-08-04 DIAGNOSIS — C34.90 MALIGNANT NEOPLASM OF LUNG, UNSPECIFIED LATERALITY, UNSPECIFIED PART OF LUNG: ICD-10-CM

## 2025-08-04 DIAGNOSIS — D63.0 ANEMIA IN NEOPLASTIC DISEASE: ICD-10-CM

## 2025-08-04 DIAGNOSIS — R53.0 NEOPLASTIC MALIGNANT RELATED FATIGUE: ICD-10-CM

## 2025-08-04 DIAGNOSIS — E43 SEVERE PROTEIN-CALORIE MALNUTRITION: ICD-10-CM

## 2025-08-04 DIAGNOSIS — R05.9 COUGH IN ADULT: ICD-10-CM

## 2025-08-04 DIAGNOSIS — R63.4 LOSS OF WEIGHT: ICD-10-CM

## 2025-08-04 DIAGNOSIS — Z71.3 NUTRITIONAL COUNSELING: ICD-10-CM

## 2025-08-04 DIAGNOSIS — R10.9 PAIN IN ABDOMEN ON PALPATION: ICD-10-CM

## 2025-08-04 DIAGNOSIS — C34.31 MALIGNANT NEOPLASM OF LOWER LOBE OF RIGHT LUNG: Primary | ICD-10-CM

## 2025-08-04 DIAGNOSIS — C77.1 MALIGNANT NEOPLASM METASTATIC TO INTRATHORACIC LYMPH NODE: ICD-10-CM

## 2025-08-04 DIAGNOSIS — E22.2 SIADH (SYNDROME OF INAPPROPRIATE ADH PRODUCTION): ICD-10-CM

## 2025-08-04 PROCEDURE — 3288F FALL RISK ASSESSMENT DOCD: CPT | Mod: CPTII,S$GLB,, | Performed by: INTERNAL MEDICINE

## 2025-08-04 PROCEDURE — 1159F MED LIST DOCD IN RCRD: CPT | Mod: CPTII,S$GLB,, | Performed by: INTERNAL MEDICINE

## 2025-08-04 PROCEDURE — 3078F DIAST BP <80 MM HG: CPT | Mod: CPTII,S$GLB,, | Performed by: INTERNAL MEDICINE

## 2025-08-04 PROCEDURE — 3008F BODY MASS INDEX DOCD: CPT | Mod: CPTII,S$GLB,, | Performed by: INTERNAL MEDICINE

## 2025-08-04 PROCEDURE — 1126F AMNT PAIN NOTED NONE PRSNT: CPT | Mod: CPTII,S$GLB,, | Performed by: INTERNAL MEDICINE

## 2025-08-04 PROCEDURE — 1101F PT FALLS ASSESS-DOCD LE1/YR: CPT | Mod: CPTII,S$GLB,, | Performed by: INTERNAL MEDICINE

## 2025-08-04 PROCEDURE — 1111F DSCHRG MED/CURRENT MED MERGE: CPT | Mod: CPTII,S$GLB,, | Performed by: INTERNAL MEDICINE

## 2025-08-04 PROCEDURE — 3074F SYST BP LT 130 MM HG: CPT | Mod: CPTII,S$GLB,, | Performed by: INTERNAL MEDICINE

## 2025-08-04 PROCEDURE — 99999 PR PBB SHADOW E&M-EST. PATIENT-LVL III: CPT | Mod: PBBFAC,,, | Performed by: INTERNAL MEDICINE

## 2025-08-04 PROCEDURE — 99215 OFFICE O/P EST HI 40 MIN: CPT | Mod: S$GLB,,, | Performed by: INTERNAL MEDICINE

## 2025-08-04 PROCEDURE — G2211 COMPLEX E/M VISIT ADD ON: HCPCS | Mod: S$GLB,,, | Performed by: INTERNAL MEDICINE

## 2025-08-04 RX ORDER — PROCHLORPERAZINE EDISYLATE 5 MG/ML
5 INJECTION INTRAMUSCULAR; INTRAVENOUS ONCE AS NEEDED
Status: CANCELLED | OUTPATIENT
Start: 2025-08-05

## 2025-08-04 RX ORDER — HEPARIN 100 UNIT/ML
500 SYRINGE INTRAVENOUS
OUTPATIENT
Start: 2025-08-19

## 2025-08-04 RX ORDER — DIPHENHYDRAMINE HYDROCHLORIDE 50 MG/ML
50 INJECTION, SOLUTION INTRAMUSCULAR; INTRAVENOUS ONCE AS NEEDED
OUTPATIENT
Start: 2025-08-19

## 2025-08-04 RX ORDER — PROCHLORPERAZINE EDISYLATE 5 MG/ML
5 INJECTION INTRAMUSCULAR; INTRAVENOUS ONCE AS NEEDED
OUTPATIENT
Start: 2025-08-19

## 2025-08-04 RX ORDER — SODIUM CHLORIDE 0.9 % (FLUSH) 0.9 %
10 SYRINGE (ML) INJECTION
OUTPATIENT
Start: 2025-08-19

## 2025-08-04 RX ORDER — DIPHENHYDRAMINE HYDROCHLORIDE 50 MG/ML
50 INJECTION, SOLUTION INTRAMUSCULAR; INTRAVENOUS ONCE AS NEEDED
Status: CANCELLED | OUTPATIENT
Start: 2025-08-05

## 2025-08-04 RX ORDER — FAMOTIDINE 10 MG/ML
20 INJECTION, SOLUTION INTRAVENOUS
OUTPATIENT
Start: 2025-08-12

## 2025-08-04 RX ORDER — FAMOTIDINE 10 MG/ML
20 INJECTION, SOLUTION INTRAVENOUS
Status: CANCELLED | OUTPATIENT
Start: 2025-08-05

## 2025-08-04 RX ORDER — HEPARIN 100 UNIT/ML
500 SYRINGE INTRAVENOUS
Status: CANCELLED | OUTPATIENT
Start: 2025-08-05

## 2025-08-04 RX ORDER — EPINEPHRINE 0.3 MG/.3ML
0.3 INJECTION SUBCUTANEOUS ONCE AS NEEDED
OUTPATIENT
Start: 2025-08-12

## 2025-08-04 RX ORDER — PROCHLORPERAZINE EDISYLATE 5 MG/ML
5 INJECTION INTRAMUSCULAR; INTRAVENOUS ONCE AS NEEDED
OUTPATIENT
Start: 2025-08-12

## 2025-08-04 RX ORDER — FAMOTIDINE 10 MG/ML
20 INJECTION, SOLUTION INTRAVENOUS
OUTPATIENT
Start: 2025-08-19

## 2025-08-04 RX ORDER — EPINEPHRINE 0.3 MG/.3ML
0.3 INJECTION SUBCUTANEOUS ONCE AS NEEDED
OUTPATIENT
Start: 2025-08-19

## 2025-08-04 RX ORDER — DIPHENHYDRAMINE HYDROCHLORIDE 50 MG/ML
50 INJECTION, SOLUTION INTRAMUSCULAR; INTRAVENOUS ONCE AS NEEDED
OUTPATIENT
Start: 2025-08-12

## 2025-08-04 RX ORDER — SODIUM CHLORIDE 0.9 % (FLUSH) 0.9 %
10 SYRINGE (ML) INJECTION
Status: CANCELLED | OUTPATIENT
Start: 2025-08-05

## 2025-08-04 RX ORDER — SODIUM CHLORIDE 0.9 % (FLUSH) 0.9 %
10 SYRINGE (ML) INJECTION
OUTPATIENT
Start: 2025-08-12

## 2025-08-04 RX ORDER — HEPARIN 100 UNIT/ML
500 SYRINGE INTRAVENOUS
OUTPATIENT
Start: 2025-08-12

## 2025-08-04 RX ORDER — EPINEPHRINE 0.3 MG/.3ML
0.3 INJECTION SUBCUTANEOUS ONCE AS NEEDED
Status: CANCELLED | OUTPATIENT
Start: 2025-08-05

## 2025-08-04 NOTE — PROGRESS NOTES
HPI    64 years old male newly discovered squamous cell carcinoma of the right lung.  Active smoker.  PET scan shows no distant metastases.  MRI of the brain is negative for malignancy involvement.  Clinical staging T4 N1 M0.  Patient's saw Radiation Oncology already.  He is not a surgical candidate.  Planning on concurrent chemoradiation x6 weeks followed by immunotherapy consolidation.    Abnormal weight loss.      Past Medical History:   Diagnosis Date    Coronary artery disease     cardiac stents    Heart attack     Hepatitis C     Hypertension      Social History     Socioeconomic History    Marital status:    Tobacco Use    Smoking status: Every Day     Current packs/day: 0.50     Average packs/day: 2.0 packs/day for 40.6 years (80.8 ttl pk-yrs)     Types: Cigarettes     Start date: 1985    Tobacco comments:     Smoking on oxygen      7/1/25 pt instructed no smoking prior to sx   Substance and Sexual Activity    Alcohol use: Not Currently     Comment: quit 6/2025    Drug use: Not Currently     Social Drivers of Health     Financial Resource Strain: Low Risk  (7/15/2025)    Overall Financial Resource Strain (CARDIA)     Difficulty of Paying Living Expenses: Not very hard   Recent Concern: Financial Resource Strain - High Risk (4/26/2025)    Received from [facility] SDOH Screening     In the past year, have you been unable to get any of the following when you really needed them? choose all that apply.: Internet   Food Insecurity: No Food Insecurity (7/15/2025)    Hunger Vital Sign     Worried About Running Out of Food in the Last Year: Never true     Ran Out of Food in the Last Year: Never true   Recent Concern: Food Insecurity - High Risk (4/26/2025)    Received from [facility] SDOH Screening     In the past 2 months, did you or others you live with eat smaller meals or skip meals because you didn't have money for food?: Yes   Transportation Needs: No Transportation Needs  (7/15/2025)    PRAPARE - Transportation     Lack of Transportation (Medical): No     Lack of Transportation (Non-Medical): No   Recent Concern: Transportation Needs - High Risk (4/26/2025)    Received from [facility] SDOH Screening     Has lack of transportation kept you from medical appointments, meetings, work or from getting things needed for daily living? choose all that apply.: Yes, it has kept me from medical appointments or from getting my medications     Has lack of transportation kept you from medical appointments, meetings, work or from getting things needed for daily living? choose all that apply.: Yes, it has kept me from non-medical meetings, appointments, work or from getting things that i need   Physical Activity: Insufficiently Active (10/17/2024)    Received from [facility]    Exercise Vital Sign     Days of Exercise per Week: 5 days     Minutes of Exercise per Session: 20 min   Stress: No Stress Concern Present (7/15/2025)    [employer] - Occupational Stress Questionnaire     Feeling of Stress : Not at all   Housing Stability: Low Risk  (7/15/2025)    Housing Stability Vital Sign     Unable to Pay for Housing in the Last Year: No     Number of Times Moved in the Last Year: 0     Homeless in the Last Year: No         Objective    Physical Exam     Vitals:    08/04/25 1359   BP: 109/68   Pulse: (!) 125   Temp: 97 °F (36.1 °C)     Awake alert no acute distress  Malnutrition  Normal rate  Normal respiratory effort  Nonfocal    Lab Results   Component Value Date    WBC 5.07 08/01/2025    HGB 9.7 (L) 08/01/2025    HCT 30.3 (L) 08/01/2025    MCV 82 08/01/2025     (H) 08/01/2025       CMP  Sodium   Date Value Ref Range Status   08/01/2025 130 (L) 136 - 145 mmol/L Final     Potassium   Date Value Ref Range Status   08/01/2025 3.8 3.5 - 5.1 mmol/L Final     Chloride   Date Value Ref Range Status   08/01/2025 97 95 - 110  mmol/L Final     CO2   Date Value Ref Range Status   08/01/2025 25 23 - 29 mmol/L Final     Glucose   Date Value Ref Range Status   08/01/2025 62 (L) 70 - 110 mg/dL Final     BUN   Date Value Ref Range Status   08/01/2025 17 8 - 23 mg/dL Final     Creatinine   Date Value Ref Range Status   08/01/2025 0.5 0.5 - 1.4 mg/dL Final     Calcium   Date Value Ref Range Status   08/01/2025 8.8 8.7 - 10.5 mg/dL Final     Protein Total   Date Value Ref Range Status   08/01/2025 7.5 6.0 - 8.4 gm/dL Final     Albumin   Date Value Ref Range Status   08/01/2025 3.2 (L) 3.5 - 5.2 g/dL Final     Bilirubin Total   Date Value Ref Range Status   08/01/2025 0.4 0.1 - 1.0 mg/dL Final     Comment:     For infants and newborns, interpretation of results should be based   on gestational age, weight and in agreement with clinical   observations.    Premature Infant recommended reference ranges:   0-24 hours:  <8.0 mg/dL   24-48 hours: <12.0 mg/dL   3-5 days:    <15.0 mg/dL   6-29 days:   <15.0 mg/dL     ALP   Date Value Ref Range Status   08/01/2025 94 55 - 135 unit/L Final     AST   Date Value Ref Range Status   08/01/2025 16 10 - 40 unit/L Final     ALT   Date Value Ref Range Status   08/01/2025 37 10 - 44 unit/L Final     Anion Gap   Date Value Ref Range Status   08/01/2025 8 8 - 16 mmol/L Final     eGFR   Date Value Ref Range Status   08/01/2025 >60 >60 mL/min/1.73/m2 Final   02/26/2025 >60.0 >60 mL/min/1.73 m^2 Final       PEtCT  Impression:     1.  Large centrally necrotic mass in the right lung extending from the hilum into the right lower lobe compatible with malignancy, noting this appears to opacify/obstruct the right mainstem bronchus, with postobstructive atelectasis/consolidation in the right lower lobe.     2.  Mild faint scattered tree-in-bud opacity in the adjacent right lung suggesting a combination of mucous, secretion, aspiration and or small airways disease.     3.  No PET-CT avid distant metastatic disease.      Path  bronchial wash  LUNG, RIGHT, BRONCHIAL WASH:     --POSITIVE FOR SQUAMOUS CELL CARCINOMA        Assessment    Squamous cell carcinoma of the right lung.  T4 N1 M0.  Stage III a.  ECOG score 1.  Abnormal weight loss secondary to cancer.  Active tobacco use.    Nonsurgical candidate    Plan for concurrent chemoradiation involving carbo Taxol paclitaxel weekly with radiation x6 weeks.  This will be followed by consolidation of immunotherapy.      Abnormal weight loss secondary to malignancy.  Starting on Megace and referred to nutritionist.    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  Treating underlying problems malignancy    Abdomen pain non-specific tender on touch/but nonobstructive no fever (chronic x 3-4 years)- CT abdomen pelvis with contrast: 1) Partially visualized large chronic mass in the right lung base corresponding to patient's known malignancy.  There is diffuse increased interstitial markings in the right lung base compatible with post radiation changes 2) No acute abnormality within the abdomen or pelvis 3) Mild fatty infiltration of the liver 4) Retained fecal material throughout the colon compatible with constipation    RTC next week prior to the treatment with labs      Plan    There are no diagnoses linked to this encounter.

## 2025-08-05 ENCOUNTER — SOCIAL WORK (OUTPATIENT)
Dept: HEMATOLOGY/ONCOLOGY | Facility: CLINIC | Age: 65
End: 2025-08-05
Payer: MEDICARE

## 2025-08-05 ENCOUNTER — INFUSION (OUTPATIENT)
Dept: INFUSION THERAPY | Facility: HOSPITAL | Age: 65
End: 2025-08-05
Attending: INTERNAL MEDICINE
Payer: MEDICARE

## 2025-08-05 VITALS
HEIGHT: 69 IN | OXYGEN SATURATION: 98 % | TEMPERATURE: 98 F | WEIGHT: 115.63 LBS | DIASTOLIC BLOOD PRESSURE: 75 MMHG | BODY MASS INDEX: 17.13 KG/M2 | RESPIRATION RATE: 18 BRPM | SYSTOLIC BLOOD PRESSURE: 116 MMHG | HEART RATE: 108 BPM

## 2025-08-05 DIAGNOSIS — C34.31 MALIGNANT NEOPLASM OF LOWER LOBE OF RIGHT LUNG: Primary | ICD-10-CM

## 2025-08-05 PROCEDURE — 96417 CHEMO IV INFUS EACH ADDL SEQ: CPT

## 2025-08-05 PROCEDURE — 96367 TX/PROPH/DG ADDL SEQ IV INF: CPT

## 2025-08-05 PROCEDURE — 63600175 PHARM REV CODE 636 W HCPCS: Performed by: INTERNAL MEDICINE

## 2025-08-05 PROCEDURE — 25000003 PHARM REV CODE 250: Performed by: INTERNAL MEDICINE

## 2025-08-05 PROCEDURE — 96375 TX/PRO/DX INJ NEW DRUG ADDON: CPT

## 2025-08-05 PROCEDURE — 96413 CHEMO IV INFUSION 1 HR: CPT

## 2025-08-05 RX ORDER — SODIUM CHLORIDE 0.9 % (FLUSH) 0.9 %
10 SYRINGE (ML) INJECTION
Status: DISCONTINUED | OUTPATIENT
Start: 2025-08-05 | End: 2025-08-05 | Stop reason: HOSPADM

## 2025-08-05 RX ORDER — FAMOTIDINE 10 MG/ML
20 INJECTION, SOLUTION INTRAVENOUS
Status: COMPLETED | OUTPATIENT
Start: 2025-08-05 | End: 2025-08-05

## 2025-08-05 RX ORDER — PROCHLORPERAZINE EDISYLATE 5 MG/ML
5 INJECTION INTRAMUSCULAR; INTRAVENOUS ONCE AS NEEDED
Status: DISCONTINUED | OUTPATIENT
Start: 2025-08-05 | End: 2025-08-05 | Stop reason: HOSPADM

## 2025-08-05 RX ORDER — HEPARIN 100 UNIT/ML
500 SYRINGE INTRAVENOUS
Status: DISCONTINUED | OUTPATIENT
Start: 2025-08-05 | End: 2025-08-05 | Stop reason: HOSPADM

## 2025-08-05 RX ORDER — DIPHENHYDRAMINE HYDROCHLORIDE 50 MG/ML
50 INJECTION, SOLUTION INTRAMUSCULAR; INTRAVENOUS ONCE AS NEEDED
Status: DISCONTINUED | OUTPATIENT
Start: 2025-08-05 | End: 2025-08-05 | Stop reason: HOSPADM

## 2025-08-05 RX ORDER — EPINEPHRINE 0.3 MG/.3ML
0.3 INJECTION SUBCUTANEOUS ONCE AS NEEDED
Status: DISCONTINUED | OUTPATIENT
Start: 2025-08-05 | End: 2025-08-05 | Stop reason: HOSPADM

## 2025-08-05 RX ADMIN — DIPHENHYDRAMINE HYDROCHLORIDE 50 MG: 50 INJECTION INTRAMUSCULAR; INTRAVENOUS at 10:08

## 2025-08-05 RX ADMIN — FAMOTIDINE 20 MG: 10 INJECTION INTRAVENOUS at 10:08

## 2025-08-05 RX ADMIN — DEXAMETHASONE SODIUM PHOSPHATE 0.25 MG: 4 INJECTION, SOLUTION INTRA-ARTICULAR; INTRALESIONAL; INTRAMUSCULAR; INTRAVENOUS; SOFT TISSUE at 10:08

## 2025-08-05 RX ADMIN — SODIUM CHLORIDE 72 MG: 9 INJECTION, SOLUTION INTRAVENOUS at 11:08

## 2025-08-05 RX ADMIN — HEPARIN 500 UNITS: 100 SYRINGE at 01:08

## 2025-08-05 RX ADMIN — SODIUM CHLORIDE: 9 INJECTION, SOLUTION INTRAVENOUS at 10:08

## 2025-08-05 RX ADMIN — CARBOPLATIN 270 MG: 10 INJECTION, SOLUTION INTRAVENOUS at 12:08

## 2025-08-05 NOTE — PLAN OF CARE
Problem: Fatigue  Goal: Improved Activity Tolerance  Outcome: Progressing  Intervention: Promote Improved Energy  Flowsheets (Taken 8/5/2025 8219)  Fatigue Management:   fatigue-related activity identified   paced activity encouraged   frequent rest breaks encouraged  Sleep/Rest Enhancement:   noise level reduced   relaxation techniques promoted   regular sleep/rest pattern promoted  Activity Management:   Ambulated -L4   Up in chair - L3  Environmental Support:   calm environment promoted   distractions minimized   rest periods encouraged

## 2025-08-05 NOTE — PROGRESS NOTES
Met with patient to inform him that he has one more delivery of 12 frozen meals provided by [employer].  Patient stated that he might want nursing home placement; I explained the cost of nursing home placement and that they will not allow him to smoke with oxygen.  He immediately said he would not go.  I reviewed assisted living information as well.  Patient said he pays for transportation; however, he has rides through his insurance.

## 2025-08-06 ENCOUNTER — SOCIAL WORK (OUTPATIENT)
Dept: HEMATOLOGY/ONCOLOGY | Facility: CLINIC | Age: 65
End: 2025-08-06
Payer: MEDICARE

## 2025-08-06 NOTE — PROGRESS NOTES
Patient said he left his cell phone at home and he is unable to call [facility] [phone].  I contacted the agency; they will pick patient up shortly.  I informed patient for him to watch for transportation to arrive.

## 2025-08-10 PROBLEM — Z60.9 POOR SOCIAL SITUATION: Status: ACTIVE | Noted: 2025-01-01

## 2025-08-10 PROBLEM — J96.20 ACUTE ON CHRONIC RESPIRATORY FAILURE: Status: ACTIVE | Noted: 2025-01-01

## 2025-08-10 PROBLEM — J44.1 COPD EXACERBATION: Status: ACTIVE | Noted: 2025-01-01

## 2025-08-10 PROBLEM — R64 CACHEXIA: Status: ACTIVE | Noted: 2025-01-01

## 2025-08-10 PROBLEM — D72.819 LEUCOPENIA: Status: ACTIVE | Noted: 2025-01-01

## 2025-08-11 PROBLEM — D64.9 ACUTE ON CHRONIC ANEMIA: Status: ACTIVE | Noted: 2025-01-01

## 2025-08-11 PROBLEM — I26.99 ACUTE PULMONARY EMBOLISM: Status: ACTIVE | Noted: 2025-01-01

## 2025-08-13 PROBLEM — J93.0 SPONTANEOUS TENSION PNEUMOTHORAX: Status: ACTIVE | Noted: 2025-01-01

## 2025-08-14 PROBLEM — E43 SEVERE MALNUTRITION: Status: ACTIVE | Noted: 2025-01-01

## 2025-08-14 PROBLEM — C34.91 SQUAMOUS CELL CARCINOMA LUNG, RIGHT: Status: ACTIVE | Noted: 2025-01-01

## 2025-08-14 PROBLEM — R63.0 ANOREXIA: Status: ACTIVE | Noted: 2025-01-01

## (undated) DEVICE — CONTAINER SPECIMEN OR STER 4OZ

## (undated) DEVICE — ADHESIVE DERMABOND ADVANCED

## (undated) DEVICE — NDL SAFETY 21G X 1 1/2 ECLPSE

## (undated) DEVICE — APPLICATOR CHLORAPREP ORN 26ML

## (undated) DEVICE — GLOVE SENSICARE PI ALOE 6

## (undated) DEVICE — STRAP OR TABLE 5IN X 72IN

## (undated) DEVICE — ELECTRODE BLADE INSULATED 1 IN

## (undated) DEVICE — GLOVE SENSICARE PI ALOE 7.5

## (undated) DEVICE — TOWEL OR DISP STRL BLUE 4/PK

## (undated) DEVICE — DRAPE C ARM 42 X 120 10/BX

## (undated) DEVICE — GLOVE SENSICARE PI GRN 6.5

## (undated) DEVICE — PACK SIRUS BASIC V SURG STRL

## (undated) DEVICE — SYR SALINE PREFILLED FLSH 10ML

## (undated) DEVICE — GOWN POLY REINF BRTH SLV XL

## (undated) DEVICE — BLADE SURG CARBON STEEL SZ11

## (undated) DEVICE — COVER TRNSDUC CIV-FLX 8.9X91.5

## (undated) DEVICE — SYR 10CC LUER LOCK

## (undated) DEVICE — COVER SURG LIGHT HANDLE

## (undated) DEVICE — SLEEVE SCD EXPRESS KNEE MEDIUM

## (undated) DEVICE — ELECTRODE MEGADYNE RETURN DUAL

## (undated) DEVICE — SUT 2-0 VICRYL / SH (J417)

## (undated) DEVICE — SUT VICRYL 3-0 27 SH

## (undated) DEVICE — BLADE SURG #15 CARBON STEEL

## (undated) DEVICE — SUT MCRYL PLUS 4-0 PS2 27IN

## (undated) DEVICE — PENCIL SMK EVAC CONNECTOR 10FT

## (undated) DEVICE — PACK SET UP 190 OMC-NS

## (undated) DEVICE — SOL NACL IRR 1000ML BTL

## (undated) DEVICE — DRAPE T TRNSVRS LAP 102X78X121